# Patient Record
Sex: FEMALE | Race: ASIAN | NOT HISPANIC OR LATINO | Employment: OTHER | URBAN - METROPOLITAN AREA
[De-identification: names, ages, dates, MRNs, and addresses within clinical notes are randomized per-mention and may not be internally consistent; named-entity substitution may affect disease eponyms.]

---

## 2017-02-07 ENCOUNTER — OFFICE VISIT - HEALTHEAST (OUTPATIENT)
Dept: INTERNAL MEDICINE | Facility: CLINIC | Age: 62
End: 2017-02-07

## 2017-02-07 DIAGNOSIS — H25.013 CATARACT CORTICAL, SENILE, BILATERAL: ICD-10-CM

## 2017-02-07 DIAGNOSIS — D32.0 BENIGN NEOPLASM OF CEREBRAL MENINGES (H): ICD-10-CM

## 2017-02-07 DIAGNOSIS — I10 ESSENTIAL HYPERTENSION: ICD-10-CM

## 2017-02-07 DIAGNOSIS — E78.5 HYPERLIPIDEMIA: ICD-10-CM

## 2017-02-07 ASSESSMENT — MIFFLIN-ST. JEOR: SCORE: 1109.68

## 2017-04-13 ENCOUNTER — OFFICE VISIT - HEALTHEAST (OUTPATIENT)
Dept: INTERNAL MEDICINE | Facility: CLINIC | Age: 62
End: 2017-04-13

## 2017-04-13 DIAGNOSIS — G40.909 SEIZURE DISORDER (H): ICD-10-CM

## 2017-04-13 DIAGNOSIS — E78.5 HYPERLIPIDEMIA: ICD-10-CM

## 2017-04-13 DIAGNOSIS — I10 ESSENTIAL HYPERTENSION: ICD-10-CM

## 2017-04-13 DIAGNOSIS — E03.9 HYPOTHYROIDISM: ICD-10-CM

## 2017-04-13 LAB
CHOLEST SERPL-MCNC: 155 MG/DL
FASTING STATUS PATIENT QL REPORTED: YES
HDLC SERPL-MCNC: 61 MG/DL
LDLC SERPL CALC-MCNC: 74 MG/DL
TRIGL SERPL-MCNC: 101 MG/DL

## 2017-04-13 ASSESSMENT — MIFFLIN-ST. JEOR: SCORE: 1109.68

## 2017-04-17 ENCOUNTER — COMMUNICATION - HEALTHEAST (OUTPATIENT)
Dept: INTERNAL MEDICINE | Facility: CLINIC | Age: 62
End: 2017-04-17

## 2017-04-20 ENCOUNTER — COMMUNICATION - HEALTHEAST (OUTPATIENT)
Dept: INTERNAL MEDICINE | Facility: CLINIC | Age: 62
End: 2017-04-20

## 2017-04-20 DIAGNOSIS — G40.909 SEIZURE DISORDER (H): ICD-10-CM

## 2017-05-24 ENCOUNTER — VIRTUAL VISIT (OUTPATIENT)
Dept: NEUROLOGY | Facility: CLINIC | Age: 62
End: 2017-05-24

## 2017-05-24 DIAGNOSIS — G40.219 PARTIAL EPILEPSY WITH IMPAIRMENT OF CONSCIOUSNESS, INTRACTABLE (H): Primary | ICD-10-CM

## 2017-05-24 NOTE — MR AVS SNAPSHOT
After Visit Summary   2017    Suzan Ballard    MRN: [id number]           Patient Information     Date Of Birth          1955        Visit Information        Provider Department      2017 3:26 PM Julian Ledesma MD [facility]        Today's Diagnoses     Partial epilepsy with impairment of consciousness, intractable (H)    -  1       Follow-ups after your visit        Who to contact     Please call your clinic at [phone] to:    Ask questions about your health    Make or cancel appointments    Discuss your medicines    Learn about your test results    Speak to your doctor   If you have compliments or concerns about an experience at your clinic, or if you wish to file a complaint, please contact [facility] Patient Relations at [phone] or email us at [email or web address]         Additional Information About Your Visit        MyChart Information     LegalGuru is an electronic gateway that provides easy, online access to your medical records. With LegalGuru, you can request a clinic appointment, read your test results, renew a prescription or communicate with your care team.     To sign up for [employer] visit the website at [email or web address]   You will be asked to enter the access code listed below, as well as some personal information. Please follow the directions to create your username and password.     Your access code is: [email or web address]  Expires: 2017  3:30 PM     Your access code will  in 90 days. If you need help or a new code, please contact your [facility] or call [phone] for assistance.        Care EveryWhere ID     This is your Care EveryWhere ID. This could be used by other organizations to access your [facility] medical records  [id number]         Blood Pressure from Last 3 Encounters:   16 126/76   09/14/15 122/80   06/01/15 144/82    Weight from Last 3  Encounters:   04/18/16 145 lb (65.8 kg)   09/14/15 158 lb 3.2 oz (71.8 kg)   06/01/15 161 lb 6.4 oz (73.2 kg)              Today, you had the following     No orders found for display       Primary Care Provider Office Phone # Fax #    Lamont Kapadia [phone]       [facility] [address]  [place] [address]        Thank you!     Thank you for choosing [facility]  for your care. Our goal is always to provide you with excellent care. Hearing back from our patients is one way we can continue to improve our services. Please take a few minutes to complete the written survey that you may receive in the mail after your visit with us. Thank you!             Your Updated Medication List - Protect others around you: Learn how to safely use, store and throw away your medicines at www.disposemymeds.org.          This list is accurate as of: 5/24/17  3:30 PM.  Always use your most recent med list.                   Brand Name Dispense Instructions for use    CALCIUM 500 + D PO      Take 1 tablet by mouth daily.       lamoTRIgine 100 MG tablet    LaMICtal    270 tablet    Take one and one half tablets twice per day (total 300 mg per day)       levothyroxine 75 MCG tablet    SYNTHROID/LEVOTHROID     Take 1 tablet by mouth daily.       simvastatin 20 MG tablet    ZOCOR     Take 1 tablet by mouth daily.       TYLENOL ARTHRITIS PAIN 650 MG CR tablet   Generic drug:  acetaminophen      Take 650 mg by mouth as needed.

## 2017-05-24 NOTE — PROGRESS NOTES
Patient requested that DMV form be filled out. She has not been seen for more than one year.    Called patient. She denies seizures. Per patient last seizure Nov 2012.  She is taking AEDs regularly. Managing her own affairs. Memory same. Not getting lost. Driving without accidents or problems.    Told patient she must continue taking AEDs. Told needs to be seen on yearly basis.    Will fill out DMV form so that patient does not lose license. Insisted that she make appointment as soon as slot available for followup.

## 2017-06-13 ENCOUNTER — OFFICE VISIT - HEALTHEAST (OUTPATIENT)
Dept: INTERNAL MEDICINE | Facility: CLINIC | Age: 62
End: 2017-06-13

## 2017-06-13 ENCOUNTER — COMMUNICATION - HEALTHEAST (OUTPATIENT)
Dept: INTERNAL MEDICINE | Facility: CLINIC | Age: 62
End: 2017-06-13

## 2017-06-13 DIAGNOSIS — G40.909 SEIZURE DISORDER (H): ICD-10-CM

## 2017-06-13 DIAGNOSIS — I10 ESSENTIAL HYPERTENSION: ICD-10-CM

## 2017-06-13 DIAGNOSIS — G89.29 CHRONIC PAIN OF RIGHT KNEE: ICD-10-CM

## 2017-06-13 DIAGNOSIS — E78.5 HYPERLIPIDEMIA: ICD-10-CM

## 2017-06-13 DIAGNOSIS — M25.561 CHRONIC PAIN OF RIGHT KNEE: ICD-10-CM

## 2017-06-13 DIAGNOSIS — H69.91 EUSTACHIAN TUBE DYSFUNCTION, RIGHT: ICD-10-CM

## 2017-07-11 ENCOUNTER — RECORDS - HEALTHEAST (OUTPATIENT)
Dept: ADMINISTRATIVE | Facility: OTHER | Age: 62
End: 2017-07-11

## 2017-07-27 ENCOUNTER — OFFICE VISIT - HEALTHEAST (OUTPATIENT)
Dept: INTERNAL MEDICINE | Facility: CLINIC | Age: 62
End: 2017-07-27

## 2017-07-27 DIAGNOSIS — E03.9 HYPOTHYROID: ICD-10-CM

## 2017-07-27 DIAGNOSIS — D32.0 BENIGN NEOPLASM OF CEREBRAL MENINGES (H): ICD-10-CM

## 2017-07-27 DIAGNOSIS — I10 ESSENTIAL HYPERTENSION: ICD-10-CM

## 2017-07-27 DIAGNOSIS — E78.5 HYPERLIPIDEMIA: ICD-10-CM

## 2017-07-27 DIAGNOSIS — G40.909 SEIZURE DISORDER (H): ICD-10-CM

## 2017-07-27 DIAGNOSIS — H93.11 TINNITUS, RIGHT: ICD-10-CM

## 2017-07-27 LAB
CHOLEST SERPL-MCNC: 189 MG/DL
FASTING STATUS PATIENT QL REPORTED: YES
HDLC SERPL-MCNC: 69 MG/DL
LDLC SERPL CALC-MCNC: 95 MG/DL
TRIGL SERPL-MCNC: 126 MG/DL

## 2017-08-01 ENCOUNTER — AMBULATORY - HEALTHEAST (OUTPATIENT)
Dept: INTERNAL MEDICINE | Facility: CLINIC | Age: 62
End: 2017-08-01

## 2017-08-01 ENCOUNTER — COMMUNICATION - HEALTHEAST (OUTPATIENT)
Dept: INTERNAL MEDICINE | Facility: CLINIC | Age: 62
End: 2017-08-01

## 2017-08-01 DIAGNOSIS — E87.6 HYPOKALEMIA: ICD-10-CM

## 2017-08-02 ENCOUNTER — RECORDS - HEALTHEAST (OUTPATIENT)
Dept: ADMINISTRATIVE | Facility: OTHER | Age: 62
End: 2017-08-02

## 2017-08-09 ENCOUNTER — RECORDS - HEALTHEAST (OUTPATIENT)
Dept: ADMINISTRATIVE | Facility: OTHER | Age: 62
End: 2017-08-09

## 2017-08-09 ENCOUNTER — OFFICE VISIT (OUTPATIENT)
Dept: NEUROLOGY | Facility: CLINIC | Age: 62
End: 2017-08-09

## 2017-08-09 VITALS — SYSTOLIC BLOOD PRESSURE: 136 MMHG | HEART RATE: 68 BPM | DIASTOLIC BLOOD PRESSURE: 84 MMHG

## 2017-08-09 DIAGNOSIS — G40.209 LOCALIZATION-RELATED SYMPTOMATIC EPILEPSY AND EPILEPTIC SYNDROMES WITH COMPLEX PARTIAL SEIZURES, NOT INTRACTABLE, WITHOUT STATUS EPILEPTICUS (H): ICD-10-CM

## 2017-08-09 DIAGNOSIS — G40.219 PARTIAL EPILEPSY WITH IMPAIRMENT OF CONSCIOUSNESS, INTRACTABLE (H): ICD-10-CM

## 2017-08-09 DIAGNOSIS — D32.0 BENIGN NEOPLASM OF CEREBRAL MENINGES (H): Primary | ICD-10-CM

## 2017-08-09 RX ORDER — LAMOTRIGINE 100 MG/1
TABLET ORAL
Qty: 540 TABLET | Refills: 1 | Status: SHIPPED | OUTPATIENT
Start: 2017-08-09 | End: 2018-11-07

## 2017-08-09 RX ORDER — MECLIZINE HCL 25MG 25 MG/1
25 TABLET, CHEWABLE ORAL EVERY 6 HOURS PRN
COMMUNITY
End: 2024-07-12

## 2017-08-09 ASSESSMENT — PAIN SCALES - GENERAL: PAINLEVEL: NO PAIN (0)

## 2017-08-09 NOTE — LETTER
2017       RE: Suzan Ballard  [address]  [place] [facility] [place] [address]     Dear Colleague,    Thank you for referring your patient, Suzan Ballard, to the [facility] at [facility]. Please see a copy of my visit note below.    2017      Lamont Kapadia Jr, MD   [facility]   [address]   [place] [address]      RE:  Suzan Ballard   MRN: [id number]   : 1955      Dear Lamont:      This is in regard to followup on Suzan Ballard.  The patient returned today with a chief complaint of previously resected meningiomas, as well as seizure disorder.      The patient has followed up with Dr. Ledesma in the past but did request to follow up with me today.  I have actually not seen her since .  She last saw Dr. Ledesma and the patient has been treated for simple partial seizures.  She did have recurrent meningiomas and she had a second resection in 2013.  Her last MRI scan was done of the brain in 2015 and it did not show any significant change.  She has not had any recurrent seizures.  She denied any jerking movements and there is nothing to suggest loss of control.  She denied any significant sensory march nor sensory changes at all.  She did have a right carpel tunnel in the past but that seems to be quiescent.  The patient did remind me in the past she did not tolerate Dilantin.  She would rather not take an anticonvulsant, but she knows she has to be on it and does continue on Lamictal.  The patient has had blood work in the past and she had a recent potassium of 3.2 and you started her on potassium supplement.  Otherwise, she had a normal metabolic profile on 2017.  Her bilirubin was slightly elevated at 1.8.  Her free T4 was 1.2.  The patient did have a normal therapeutic Lamictal level of 10.5 on 2016.  She did have a normal complete blood count last with a  hemoglobin of 14.5 and a white count of 6100 and platelet count of 271,000.      The patient did remind me she suffers from chronic obstructive sleep apnea, and she does use her CPAP machine.  She has had no trouble driving whatsoever.  She has been very careful about followup in this office.  The patient understands that we are going to close this office 09/29 and she will have to be followed at the [facility] office in [place].      The patient did have a generalized convulsion in 11/2012.  She has also had trouble with her right knee, but an injection 3 weeks ago helped.  Her father is quite ill.  He is elderly, in his late 80s in the [facility] and she will be going back there probably to be with him.      Neurological examination revealed a pleasant woman.  Her Mini-Mental Status score today was 27/30.  She could not recall any of 3 objects after 5 minutes, but she had no trouble telling me about her history, nor any other medical problems.  She knew you and she knew me and she knew the president and about recent world affairs.  Her blood pressure is 136/84 with a pulse of 68.  Gait, station, cerebellar testing, muscle stretch reflexes, except for slightly increased right knee jerk, plantar stimulation, strength, cranial nerve examination including full visual fields, as well as extraocular movements, and cortical sensory testing are unremarkable.  I could not auscultate any cardiac rhythm abnormalities.  She had no gallops or murmurs and did not have cervical bruits.      IMPRESSION:   1.  Resected meningiomas.   2.  Simple partial seizures which are under good control.      The patient is going to go ahead and have appropriate blood work today.  This will be done through your office.  She is going to get back to me as to the results.  She did ask me to consider filling out her driving forms.  She is going to have followup with me after having a repeat MRI scan with and without gadolinium done.      I spent 35  minutes with the patient today.  Over 50% of the time this involved counseling and coordination of care.      Sincerely yours,      Raji Perez MD              D: 2017 14:28   T: 2017 14:49   MT: al      Name:     FLORESITA ROY   MRN:      [id number]        Account:      [id number]   :      1955           Service Date: 2017      Document: [id number]

## 2017-08-09 NOTE — MR AVS SNAPSHOT
After Visit Summary   8/9/2017    Suzan Ballard    MRN: [id number]           Patient Information     Date Of Birth          1955        Visit Information        Provider Department      8/9/2017 10:15 AM Raji Perez MD [facility]        Today's Diagnoses     Benign neoplasm of cerebral meninges (H)    -  1    Partial epilepsy with impairment of consciousness, intractable (H)        Localization-related symptomatic epilepsy and epileptic syndromes with complex partial seizures, not intractable, without status epilepticus (H)           Follow-ups after your visit        Follow-up notes from your care team     Return in about 6 days (around 8/15/2017).      Your next 10 appointments already scheduled     Aug 13, 2017  1:45 PM CDT   (Arrive by 1:30 PM)   MR BRAIN W/O & W CONTRAST with [address] MRI ([facility])    [address] [place] [id number] [phone]           Take your medicines as usual, unless your doctor tells you not to. Bring a list of your current medicines to your exam (including vitamins, minerals and over-the-counter drugs).  You will be given intravenous contrast for this exam. To prepare:   The day before your exam, drink extra fluids at least six 8-ounce glasses (unless your doctor tells you to restrict your fluids).   Have a blood test (creatinine test) within 30 days of your exam. Go to your clinic or Diagnostic Imaging Department for this test.  The MRI machine uses a strong magnet. Please wear clothes without metal (snaps, zippers). A sweatsuit works well, or we may give you a hospital gown.  Please remove any body piercings and hair extensions before you arrive. You will also remove watches, jewelry, hairpins, wallets, dentures, partial dental plates and hearing aids. You may wear contact lenses, and you may be able to wear your rings. We  have a safe place to keep your personal items, but it is safer to leave them at home.   **IMPORTANT** THE INSTRUCTIONS BELOW ARE ONLY FOR THOSE PATIENTS WHO HAVE BEEN TOLD THEY WILL RECEIVE SEDATION OR GENERAL ANESTHESIA DURING THEIR MRI PROCEDURE:  IF YOU WILL RECEIVE SEDATION (take medicine to help you relax during your exam):   You must get the medicine from your doctor before you arrive. Bring the medicine to the exam. Do not take it at home.   Arrive one hour early. Bring someone who can take you home after the test. Your medicine will make you sleepy. After the exam, you may not drive, take a bus or take a taxi by yourself.   No eating 8 hours before your exam. You may have clear liquids up until 4 hours before your exam. (Clear liquids include water, clear tea, black coffee and fruit juice without pulp.)  IF YOU WILL RECEIVE ANESTHESIA (be asleep for your exam):   Arrive 1 1/2 hours early. Bring someone who can take you home after the test. You may not drive, take a bus or take a taxi by yourself.   No eating 8 hours before your exam. You may have clear liquids up until 4 hours before your exam. (Clear liquids include water, clear tea, black coffee and fruit juice without pulp.)  Please call the Imaging Department at your exam site with any questions.              Future tests that were ordered for you today     Open Future Orders        Priority Expected Expires Ordered    MR Head w/o & w Contrast (MRI) Routine 8/9/2017 8/9/2018 8/9/2017            Who to contact     Please call your clinic at [phone] to:    Ask questions about your health    Make or cancel appointments    Discuss your medicines    Learn about your test results    Speak to your doctor   If you have compliments or concerns about an experience at your clinic, or if you wish to file a complaint, please contact [facility] Patient Relations at [phone] or email us at [email or web address]          Additional Information About Your Visit        [employer] Information     Voxbone is an electronic gateway that provides easy, online access to your medical records. With Voxbone, you can request a clinic appointment, read your test results, renew a prescription or communicate with your care team.     To sign up for Voxbone visit the website at [email or web address]   You will be asked to enter the access code listed below, as well as some personal information. Please follow the directions to create your username and password.     Your access code is: [email or web address]  Expires: 2017  3:30 PM     Your access code will  in 90 days. If you need help or a new code, please contact your [facility] or call [phone] for assistance.        Care EveryWhere ID     This is your Care EveryWhere ID. This could be used by other organizations to access your [facility] medical records  [id number]        Your Vitals Were     Pulse                   68            Blood Pressure from Last 3 Encounters:   17 136/84   16 126/76   09/14/15 122/80    Weight from Last 3 Encounters:   16 145 lb (65.8 kg)   09/14/15 158 lb 3.2 oz (71.8 kg)   06/01/15 161 lb 6.4 oz (73.2 kg)              We Performed the Following     Lamotrigine Level          Where to get your medicines      These medications were sent to [facility] - [place], [place] - [address] AT [address]  [address], [place] [address]     Phone:  [phone]     lamoTRIgine 100 MG tablet          Primary Care Provider Office Phone # Fax #    Lamont Kapadia [phone]       [address] [place] [id number]        Equal Access to Services     DAVID MARY : Kelley perdomoo Soross, waaxda luqadaha, qaybta kaalmada adetimboyasimran, delio nicholas. So [facility] [phone].    ATENCIÓN: Si shola espharry, zahra becerril  disposición servicios gratuitos de asistencia lingüística. Wilfred black [phone].    We comply with applicable federal civil rights laws and [place] laws. We do not discriminate on the basis of race, color, national origin, age, disability sex, sexual orientation or gender identity.            Thank you!     Thank you for choosing [facility]  for your care. Our goal is always to provide you with excellent care. Hearing back from our patients is one way we can continue to improve our services. Please take a few minutes to complete the written survey that you may receive in the mail after your visit with us. Thank you!             Your Updated Medication List - Protect others around you: Learn how to safely use, store and throw away your medicines at www.disposemymeds.org.          This list is accurate as of: 8/9/17 10:59 AM.  Always use your most recent med list.                   Brand Name Dispense Instructions for use Diagnosis    AMLODIPINE BESYLATE PO      Take 5 mg by mouth daily    Benign neoplasm of cerebral meninges (H), Partial epilepsy with impairment of consciousness, intractable (H)       CALCIUM 500 + D PO      Take 1 tablet by mouth daily.        lamoTRIgine 100 MG tablet    LaMICtal    540 tablet    Take one and one half tablets twice per day (total 300 mg per day)    Localization-related symptomatic epilepsy and epileptic syndromes with complex partial seizures, not intractable, without status epilepticus (H)       levothyroxine 75 MCG tablet    SYNTHROID/LEVOTHROID     Take 1 tablet by mouth daily.        meclizine 25 MG Chew      Take 25 mg by mouth every 6 hours as needed for dizziness    Benign neoplasm of cerebral meninges (H), Partial epilepsy with impairment of consciousness, intractable (H)       simvastatin 20 MG tablet    ZOCOR     Take 1 tablet by mouth daily.        TYLENOL ARTHRITIS PAIN 650 MG CR tablet   Generic drug:  acetaminophen       Take 650 mg by mouth as needed.

## 2017-08-11 ENCOUNTER — TRANSFERRED RECORDS (OUTPATIENT)
Dept: HEALTH INFORMATION MANAGEMENT | Facility: CLINIC | Age: 62
End: 2017-08-11

## 2017-08-11 ENCOUNTER — RECORDS - HEALTHEAST (OUTPATIENT)
Dept: ADMINISTRATIVE | Facility: OTHER | Age: 62
End: 2017-08-11

## 2017-08-11 ENCOUNTER — AMBULATORY - HEALTHEAST (OUTPATIENT)
Dept: LAB | Facility: CLINIC | Age: 62
End: 2017-08-11

## 2017-08-11 DIAGNOSIS — D32.0 BENIGN NEOPLASM OF CEREBRAL MENINGES (H): ICD-10-CM

## 2017-08-28 ENCOUNTER — CARE COORDINATION (OUTPATIENT)
Dept: NEUROLOGY | Facility: CLINIC | Age: 62
End: 2017-08-28

## 2017-08-28 NOTE — PROGRESS NOTES
2017      Lamont Kapadia Jr, MD   [address]   [place] [address]      RE:  Suzan Ballard   MRN: [id number]   : 1955      Dear Lamont:      This is in regard to followup on Suzan Ballard.  The patient returned today with a chief complaint of previously resected meningiomas, as well as seizure disorder.      The patient has followed up with Dr. Ledesma in the past but did request to follow up with me today.  I have actually not seen her since .  She last saw Dr. Ledesma and the patient has been treated for simple partial seizures.  She did have recurrent meningiomas and she had a second resection in 2013.  Her last MRI scan was done of the brain in 2015 and it did not show any significant change.  She has not had any recurrent seizures.  She denied any jerking movements and there is nothing to suggest loss of control.  She denied any significant sensory march nor sensory changes at all.  She did have a right carpel tunnel in the past but that seems to be quiescent.  The patient did remind me in the past she did not tolerate Dilantin.  She would rather not take an anticonvulsant, but she knows she has to be on it and does continue on Lamictal.  The patient has had blood work in the past and she had a recent potassium of 3.2 and you started her on potassium supplement.  Otherwise, she had a normal metabolic profile on 2017.  Her bilirubin was slightly elevated at 1.8.  Her free T4 was 1.2.  The patient did have a normal therapeutic Lamictal level of 10.5 on 2016.  She did have a normal complete blood count last with a hemoglobin of 14.5 and a white count of 6100 and platelet count of 271,000.      The patient did remind me she suffers from chronic obstructive sleep apnea, and she does use her CPAP machine.  She has had no trouble driving whatsoever.  She has been very careful about followup in this office.  The patient understands that  we are going to close this office 09/29 and she will have to be followed at the [facility] office in [place].      The patient did have a generalized convulsion in 11/2012.  She has also had trouble with her right knee, but an injection 3 weeks ago helped.  Her father is quite ill.  He is elderly, in his late 80s in the [facility] and she will be going back there probably to be with him.      Neurological examination revealed a pleasant woman.  Her Mini-Mental Status score today was 27/30.  She could not recall any of 3 objects after 5 minutes, but she had no trouble telling me about her history, nor any other medical problems.  She knew you and she knew me and she knew the president and about recent world affairs.  Her blood pressure is 136/84 with a pulse of 68.  Gait, station, cerebellar testing, muscle stretch reflexes, except for slightly increased right knee jerk, plantar stimulation, strength, cranial nerve examination including full visual fields, as well as extraocular movements, and cortical sensory testing are unremarkable.  I could not auscultate any cardiac rhythm abnormalities.  She had no gallops or murmurs and did not have cervical bruits.      IMPRESSION:   1.  Resected meningiomas.   2.  Simple partial seizures which are under good control.      The patient is going to go ahead and have appropriate blood work today.  This will be done through your office.  She is going to get back to me as to the results.  She did ask me to consider filling out her driving forms.  She is going to have followup with me after having a repeat MRI scan with and without gadolinium done.      I spent 35 minutes with the patient today.  Over 50% of the time this involved counseling and coordination of care.      Sincerely yours,      MD TANISHA Amos MD             D: 08/28/2017 14:28   T: 08/28/2017 14:49   MT: al      Name:     FLORESITA ROY   MRN:      [id number]        Account:       [id number]   :      1955           Service Date: 2017      Document: [id number]

## 2017-08-28 NOTE — PROGRESS NOTES
Katerine Burroughs RN Seeb, Tracey, RN                   Left a vm asking her to call me back.            Previous Messages       ----- Message -----      From: Raji Perez MD      Sent: 8/22/2017  11:29 AM        To: Katerine Burroughs RN     Tell her slight increase in meningioma; needs to see me in follow up            Message sent to the front staff to help arrange an appointment with Dr. Perez to follow up increase in meningioma.

## 2017-08-29 ENCOUNTER — TELEPHONE (OUTPATIENT)
Dept: NEUROLOGY | Facility: CLINIC | Age: 62
End: 2017-08-29

## 2017-08-29 NOTE — TELEPHONE ENCOUNTER
----- Message from Zina Vasquez sent at 8/29/2017  2:27 PM CDT -----  Left pt 2VMs    ----- Message -----     From: Katerine Burroughs RN     Sent: 8/28/2017   7:27 AM       To: Clinic [id number]    Please help schedule a return with Dr. Perez for follow up per Dr. Perez.  Thank you!

## 2017-10-23 ENCOUNTER — COMMUNICATION - HEALTHEAST (OUTPATIENT)
Dept: INTERNAL MEDICINE | Facility: CLINIC | Age: 62
End: 2017-10-23

## 2017-10-23 DIAGNOSIS — I10 ESSENTIAL HYPERTENSION: ICD-10-CM

## 2017-10-26 ENCOUNTER — OFFICE VISIT - HEALTHEAST (OUTPATIENT)
Dept: INTERNAL MEDICINE | Facility: CLINIC | Age: 62
End: 2017-10-26

## 2017-10-26 ENCOUNTER — COMMUNICATION - HEALTHEAST (OUTPATIENT)
Dept: INTERNAL MEDICINE | Facility: CLINIC | Age: 62
End: 2017-10-26

## 2017-10-26 DIAGNOSIS — Z23 NEED FOR IMMUNIZATION AGAINST INFLUENZA: ICD-10-CM

## 2017-10-26 DIAGNOSIS — I10 ESSENTIAL HYPERTENSION: ICD-10-CM

## 2017-10-26 DIAGNOSIS — J30.9 ALLERGIC RHINITIS: ICD-10-CM

## 2017-10-26 DIAGNOSIS — E03.9 HYPOTHYROIDISM: ICD-10-CM

## 2017-10-26 DIAGNOSIS — E78.5 HYPERLIPIDEMIA: ICD-10-CM

## 2017-10-26 DIAGNOSIS — Z12.4 PAP SMEAR FOR CERVICAL CANCER SCREENING: ICD-10-CM

## 2017-10-26 DIAGNOSIS — G40.909 SEIZURE DISORDER (H): ICD-10-CM

## 2017-10-26 LAB
CHOLEST SERPL-MCNC: 177 MG/DL
FASTING STATUS PATIENT QL REPORTED: YES
HDLC SERPL-MCNC: 62 MG/DL
LDLC SERPL CALC-MCNC: 95 MG/DL
TRIGL SERPL-MCNC: 98 MG/DL

## 2017-10-26 ASSESSMENT — MIFFLIN-ST. JEOR: SCORE: 1109.68

## 2017-10-28 ENCOUNTER — OFFICE VISIT (OUTPATIENT)
Dept: NEUROLOGY | Facility: CLINIC | Age: 62
End: 2017-10-28

## 2017-10-28 ENCOUNTER — RECORDS - HEALTHEAST (OUTPATIENT)
Dept: ADMINISTRATIVE | Facility: OTHER | Age: 62
End: 2017-10-28

## 2017-10-28 VITALS
SYSTOLIC BLOOD PRESSURE: 142 MMHG | WEIGHT: 145 LBS | HEIGHT: 60 IN | BODY MASS INDEX: 28.47 KG/M2 | DIASTOLIC BLOOD PRESSURE: 80 MMHG | HEART RATE: 68 BPM

## 2017-10-28 DIAGNOSIS — D32.0 BENIGN NEOPLASM OF CEREBRAL MENINGES (H): Primary | ICD-10-CM

## 2017-10-28 ASSESSMENT — PAIN SCALES - GENERAL: PAINLEVEL: NO PAIN (0)

## 2017-10-28 NOTE — LETTER
10/28/2017       RE: Suzan Ballard  [address]  [facility] [address]     Dear Colleague,    Thank you for referring your patient, Suzan Ballard, to the [facility] NEUROLOGY at [facility]. Please see a copy of my visit note below.    2017      Lamont Kapadia Jr., MD   [facility]    [address]   [place] [address]      RE: Suzan Ballard   MRN: [id number]   : 1955      Dear Lamont:      This is in regard to followup on Suzan Ballard.  The patient returned routinely today on 10/28/2017.  Her chief complaint is that of recurrent meningiomas as well as seizure disorder.      The patient has done quite well since I last saw her.  She went to visit her father in [place].  Fortunately, he did not have cancer and had some type of prostate surgery and is much better.  He is 86 years old.  She said she lost weight there and she feels better.  She went from 160 pounds after her last surgery to now 145 pounds.  She tells me she never misses her Lamictal dosage and she has had no trouble taking the 300 mg a day in divided dosage.  She continues on amlodipine, calcium and vitamin D, levothyroxine and simvastatin.  She told me that you just recently rechecked her potassium level and she was told it was fine.  She at my suggestion did have a Lamictal level that was therapeutic on 2017.  The patient did go over with me her blood tests from the Epic website.  Her Lamictal level was 5.4.      The patient did have followup MRI scan.  This was compared to the previous last study of 2015.  She did have a presumed meningioma that had slightly grown in size.  This was an extraaxial nodular lesion posterior to the superior sagittal sinus.  This measured 1.7 cm left to right 1.3 cm anterior posterior and 1.9 cm craniocaudally.  She also had a very tiny meningioma that was in the anterior falx right of midline measuring 8 x 5 x 7 mm  that was unchanged from prior studies.  I went over these films with her.      Neurologic examination showed the patient's blood pressure today was 142/80 with a pulse of 68.  She could draw a clock and make it say 4:30, but she inverted the hands of the clock.  She could recall 1/3 objects after 5 minutes with prompting 2/3 objects.  She could tell me the correct date and could tell me about Trump and about recent world affairs.  She had full visual fields to confrontation.  Reflexes were hypoactive in the arms, normal at the knees and absent at the ankles.  Her toes downward going to plantar stimulation.  Strength testing was normal.  She had a normal gait with no cerebellar abnormalities noted.  The patient had normal cardiac rhythm without gallops or murmurs and did not have cervical bruits.      IMPRESSION:   1.  Recurrent meningiomas.   2.  Stable seizure disorder.      I am going to refer the patient now back to our neurosurgeons and she did request that she see Dr. Sauer.  The patient will be followed in Neurology here.  She more than likely will have to have followup MRI scans at least now 1 in 3-6 months because of increase in size.  I will leave some of this to Dr. Sauer and to his recommendations.  The patient does understand all of this.      Thank you for allowing me to see this patient.      Sincerely yours,      Tanisha Perez MD      I spent 25 minutes with the patient today.  Over 50% of the time this involved counseling and coordination of care.         TANISHA PEREZ MD             D: 10/28/2017 11:27   T: 10/30/2017 07:03   MT: LUIS      Name:     FLORESITA ROY   MRN:      [id number]        Account:      [id number]   :      1955           Service Date: 10/28/2017      Document: [id number]       Again, thank you for allowing me to participate in the care of your patient.      Sincerely,    Tanisha Perez MD

## 2017-10-28 NOTE — MR AVS SNAPSHOT
After Visit Summary   10/28/2017    Suzan Ballard    MRN: [id number]           Patient Information     Date Of Birth          1955        Visit Information        Provider Department      10/28/2017 10:40 AM Raji Perez MD [facility] Neurology        Today's Diagnoses     Benign neoplasm of cerebral meninges (H)    -  1       Follow-ups after your visit        Additional Services     NEUROSURGERY REFERRAL       Your provider has referred you to: [facility]: Neurosurgery Clinic [facility] [phone]   [email or web address]    Please be aware that coverage of these services is subject to the terms and limitations of your health insurance plan.  Call member services at your health plan with any benefit or coverage questions.      Please bring the following with you to your appointment:    (1) Any X-Rays, CTs or MRIs which have been performed.  Contact the facility where they were done to arrange for  prior to your scheduled appointment.   (2) List of current medications  (3) This referral request   (4) Any documents/labs given to you for this referral                  Follow-up notes from your care team     Return in about 6 months (around 4/28/2018).      Your next 10 appointments already scheduled     Apr 25, 2018  9:00 AM CDT   (Arrive by 8:45 AM)   Return Visit with Raji Perez MD   [facility] Neurology ([facility])    [address] [place] [id number] [phone]              Who to contact     Please call your clinic at [phone] to:    Ask questions about your health    Make or cancel appointments    Discuss your medicines    Learn about your test results    Speak to your doctor   If you have compliments or concerns about an experience at your clinic, or if you wish to file a complaint, please contact [facility] Phys[facility] Patient Relations at [phone] or  email us at [email or web address]         Additional Information About Your Visit        [employer] Information     [employer] gives you secure access to your electronic health record. If you see a primary care provider, you can also send messages to your care team and make appointments. If you have questions, please call your primary care clinic.  If you do not have a primary care provider, please call [phone] and they will assist you.      [employer] is an electronic gateway that provides easy, online access to your medical records. With [employer], you can request a clinic appointment, read your test results, renew a prescription or communicate with your care team.     To access your existing account, please contact your [facility] or call [phone] for assistance.        Care EveryWhere ID     This is your Care EveryWhere ID. This could be used by other organizations to access your [facility] medical records  [id number]        Your Vitals Were     Pulse Height BMI (Body Mass Index)             68 1.524 m (5') 28.32 kg/m2          Blood Pressure from Last 3 Encounters:   10/28/17 142/80   08/09/17 136/84   04/18/16 126/76    Weight from Last 3 Encounters:   10/28/17 65.8 kg (145 lb)   04/18/16 65.8 kg (145 lb)   09/14/15 71.8 kg (158 lb 3.2 oz)              We Performed the Following     NEUROSURGERY REFERRAL        Primary Care Provider Office Phone # Fax #    Lamont Kapadia [phone]       [address] [address]        Equal Access to Services     PABLO MARY : Hadii zachariah perdomoo Soross, waaxda luqadaha, qaybta kaalmada adeegyasimran, delio nicholas. So [facility] [phone].    ATENCIÓN: Si habla español, tiene a becerril disposición servicios gratuitos de asistencia lingüística. Llame al [phone].    We comply with applicable federal civil rights laws and [place] laws. We do not  discriminate on the basis of race, color, national origin, age, disability, sex, sexual orientation, or gender identity.            Thank you!     Thank you for choosing [facility] NEUROLOGY  for your care. Our goal is always to provide you with excellent care. Hearing back from our patients is one way we can continue to improve our services. Please take a few minutes to complete the written survey that you may receive in the mail after your visit with us. Thank you!             Your Updated Medication List - Protect others around you: Learn how to safely use, store and throw away your medicines at www.disposemymeds.org.          This list is accurate as of: 10/28/17 11:19 AM.  Always use your most recent med list.                   Brand Name Dispense Instructions for use Diagnosis    AMLODIPINE BESYLATE PO      Take 5 mg by mouth daily    Benign neoplasm of cerebral meninges (H), Partial epilepsy with impairment of consciousness, intractable (H)       CALCIUM 500 + D PO      Take 1 tablet by mouth daily.        lamoTRIgine 100 MG tablet    LaMICtal    540 tablet    Take one and one half tablets twice per day (total 300 mg per day)    Localization-related symptomatic epilepsy and epileptic syndromes with complex partial seizures, not intractable, without status epilepticus (H)       levothyroxine 75 MCG tablet    SYNTHROID/LEVOTHROID     Take 1 tablet by mouth daily.        meclizine 25 MG Chew      Take 25 mg by mouth every 6 hours as needed for dizziness    Benign neoplasm of cerebral meninges (H), Partial epilepsy with impairment of consciousness, intractable (H)       simvastatin 20 MG tablet    ZOCOR     Take 1 tablet by mouth daily.        TYLENOL ARTHRITIS PAIN 650 MG CR tablet   Generic drug:  acetaminophen      Take 650 mg by mouth as needed.

## 2017-10-30 ENCOUNTER — COMMUNICATION - HEALTHEAST (OUTPATIENT)
Dept: INTERNAL MEDICINE | Facility: CLINIC | Age: 62
End: 2017-10-30

## 2017-10-30 ENCOUNTER — RECORDS - HEALTHEAST (OUTPATIENT)
Dept: ADMINISTRATIVE | Facility: OTHER | Age: 62
End: 2017-10-30

## 2017-10-30 NOTE — PROGRESS NOTES
2017      Lamont Kapadia Jr., MD   [facility]    [address]   [place] [address]      RE: Suzan Ballard   MRN: [id number]   : 1955      Dear Lamont:      This is in regard to followup on Suzan Ballard.  The patient returned routinely today on 10/28/2017.  Her chief complaint is that of recurrent meningiomas as well as seizure disorder.      The patient has done quite well since I last saw her.  She went to visit her father in [place].  Fortunately, he did not have cancer and had some type of prostate surgery and is much better.  He is 86 years old.  She said she lost weight there and she feels better.  She went from 160 pounds after her last surgery to now 145 pounds.  She tells me she never misses her Lamictal dosage and she has had no trouble taking the 300 mg a day in divided dosage.  She continues on amlodipine, calcium and vitamin D, levothyroxine and simvastatin.  She told me that you just recently rechecked her potassium level and she was told it was fine.  She at my suggestion did have a Lamictal level that was therapeutic on 2017.  The patient did go over with me her blood tests from the Epic website.  Her Lamictal level was 5.4.      The patient did have followup MRI scan.  This was compared to the previous last study of 2015.  She did have a presumed meningioma that had slightly grown in size.  This was an extraaxial nodular lesion posterior to the superior sagittal sinus.  This measured 1.7 cm left to right 1.3 cm anterior posterior and 1.9 cm craniocaudally.  She also had a very tiny meningioma that was in the anterior falx right of midline measuring 8 x 5 x 7 mm that was unchanged from prior studies.  I went over these films with her.      Neurologic examination showed the patient's blood pressure today was 142/80 with a pulse of 68.  She could draw a clock and make it say 4:30, but she inverted the hands of the clock.  She could recall  1/3 objects after 5 minutes with prompting 2/3 objects.  She could tell me the correct date and could tell me about Trump and about recent world affairs.  She had full visual fields to confrontation.  Reflexes were hypoactive in the arms, normal at the knees and absent at the ankles.  Her toes downward going to plantar stimulation.  Strength testing was normal.  She had a normal gait with no cerebellar abnormalities noted.  The patient had normal cardiac rhythm without gallops or murmurs and did not have cervical bruits.      IMPRESSION:   1.  Recurrent meningiomas.   2.  Stable seizure disorder.      I am going to refer the patient now back to our neurosurgeons and she did request that she see Dr. Sauer.  The patient will be followed in Neurology here.  She more than likely will have to have followup MRI scans at least now 1 in 3-6 months because of increase in size.  I will leave some of this to Dr. Sauer and to his recommendations.  The patient does understand all of this.      Thank you for allowing me to see this patient.      Sincerely yours,      Tanisha Perez MD      I spent 25 minutes with the patient today.  Over 50% of the time this involved counseling and coordination of care.         TANISHA PEREZ MD             D: 10/28/2017 11:27   T: 10/30/2017 07:03   MT: LUIS      Name:     FLORESITA ROY   MRN:      [id number]        Account:      [id number]   :      1955           Service Date: 10/28/2017      Document: [id number]

## 2017-10-31 ASSESSMENT — ENCOUNTER SYMPTOMS
MUSCLE WEAKNESS: 0
SINUS CONGESTION: 0
EYE REDNESS: 1
DOUBLE VISION: 0
DIZZINESS: 1
SORE THROAT: 0
SEIZURES: 0
ARTHRALGIAS: 1
DEPRESSION: 0
NUMBNESS: 0
MYALGIAS: 1
EYE WATERING: 1
WEAKNESS: 0
EYE IRRITATION: 1
NECK PAIN: 1
BACK PAIN: 0
EYE PAIN: 0
HEADACHES: 1
JOINT SWELLING: 0
NECK MASS: 0
DECREASED CONCENTRATION: 1
STIFFNESS: 1
MEMORY LOSS: 1
SPEECH CHANGE: 0
TINGLING: 0
DISTURBANCES IN COORDINATION: 1
SINUS PAIN: 0
PARALYSIS: 0
MUSCLE CRAMPS: 0
TASTE DISTURBANCE: 0
LOSS OF CONSCIOUSNESS: 0
PANIC: 1
NERVOUS/ANXIOUS: 1
TROUBLE SWALLOWING: 0
TREMORS: 0
HOARSE VOICE: 0
SMELL DISTURBANCE: 0
INSOMNIA: 1

## 2017-11-06 ENCOUNTER — RECORDS - HEALTHEAST (OUTPATIENT)
Dept: ADMINISTRATIVE | Facility: OTHER | Age: 62
End: 2017-11-06

## 2017-11-06 ENCOUNTER — OFFICE VISIT (OUTPATIENT)
Dept: NEUROSURGERY | Facility: CLINIC | Age: 62
End: 2017-11-06
Attending: NEUROLOGICAL SURGERY
Payer: MEDICARE

## 2017-11-06 VITALS
SYSTOLIC BLOOD PRESSURE: 139 MMHG | DIASTOLIC BLOOD PRESSURE: 80 MMHG | WEIGHT: 149.1 LBS | BODY MASS INDEX: 29.12 KG/M2 | OXYGEN SATURATION: 95 % | HEART RATE: 71 BPM | RESPIRATION RATE: 16 BRPM | TEMPERATURE: 97.9 F

## 2017-11-06 DIAGNOSIS — D32.0 BENIGN NEOPLASM OF CEREBRAL MENINGES (H): Primary | ICD-10-CM

## 2017-11-06 PROCEDURE — 99211 OFF/OP EST MAY X REQ PHY/QHP: CPT

## 2017-11-06 ASSESSMENT — PAIN SCALES - GENERAL: PAINLEVEL: NO PAIN (0)

## 2017-11-06 NOTE — MR AVS SNAPSHOT
After Visit Summary   11/6/2017    Suzan Ballard    MRN: [id number]           Patient Information     Date Of Birth          1955        Visit Information        Provider Department      11/6/2017 3:15 PM Narinder Sauer MD [facility]        Today's Diagnoses     Benign neoplasm of cerebral meninges (H)    -  1       Follow-ups after your visit        Follow-up notes from your care team     Return in about 1 year (around 11/6/2018) for Imaging.      Your next 10 appointments already scheduled     Apr 25, 2018  9:00 AM CDT   (Arrive by 8:45 AM)   Return Visit with Raji Perez MD   [facility] Neurology ([facility])    [address] [place] [address]  [address] [facility] [id number] [phone]            Nov 03, 2018  9:15 AM CDT   (Arrive by 9:00 AM)   MR BRAIN W/O & W CONTRAST with [address] MRI ([facility])    [address] [place] [id number] [phone]           Take your medicines as usual, unless your doctor tells you not to. Bring a list of your current medicines to your exam (including vitamins, minerals and over-the-counter drugs).  You will be given intravenous contrast for this exam. To prepare:   The day before your exam, drink extra fluids at least six 8-ounce glasses (unless your doctor tells you to restrict your fluids).   Have a blood test (creatinine test) within 30 days of your exam. Go to your clinic or Diagnostic Imaging Department for this test.  The MRI machine uses a strong magnet. Please wear clothes without metal (snaps, zippers). A sweatsuit works well, or we may give you a hospital gown.  Please remove any body piercings and hair extensions before you arrive. You will also remove watches, jewelry, hairpins, wallets, dentures, partial dental plates and hearing aids. You may wear contact lenses, and you may be able to wear your rings. We have a  safe place to keep your personal items, but it is safer to leave them at home.   **IMPORTANT** THE INSTRUCTIONS BELOW ARE ONLY FOR THOSE PATIENTS WHO HAVE BEEN TOLD THEY WILL RECEIVE SEDATION OR GENERAL ANESTHESIA DURING THEIR MRI PROCEDURE:  IF YOU WILL RECEIVE SEDATION (take medicine to help you relax during your exam):   You must get the medicine from your doctor before you arrive. Bring the medicine to the exam. Do not take it at home.   Arrive one hour early. Bring someone who can take you home after the test. Your medicine will make you sleepy. After the exam, you may not drive, take a bus or take a taxi by yourself.   No eating 8 hours before your exam. You may have clear liquids up until 4 hours before your exam. (Clear liquids include water, clear tea, black coffee and fruit juice without pulp.)  IF YOU WILL RECEIVE ANESTHESIA (be asleep for your exam):   Arrive 1 1/2 hours early. Bring someone who can take you home after the test. You may not drive, take a bus or take a taxi by yourself.   No eating 8 hours before your exam. You may have clear liquids up until 4 hours before your exam. (Clear liquids include water, clear tea, black coffee and fruit juice without pulp.)  Please call the Imaging Department at your exam site with any questions.              Future tests that were ordered for you today     Open Future Orders        Priority Expected Expires Ordered    MR Brain w/o & w Contrast Routine 11/7/2018 11/7/2018 11/6/2017            Who to contact     If you have questions or need follow up information about today's clinic visit or your schedule please contact [facility] CANCER CLINIC directly at [phone].  Normal or non-critical lab and imaging results will be communicated to you by MyChart, letter or phone within 4 business days after the clinic has received the results. If you do not hear from us within 7 days, please contact the clinic through MyChart or phone. If you have a critical or  abnormal lab result, we will notify you by phone as soon as possible.  Submit refill requests through "[employer]" or call your pharmacy and they will forward the refill request to us. Please allow 3 business days for your refill to be completed.          Additional Information About Your Visit        [employer] Information     "[employer] gives you secure access to your electronic health record. If you see a primary care provider, you can also send messages to your care team and make appointments. If you have questions, please call your primary care clinic.  If you do not have a primary care provider, please call [phone] and they will assist you.        Care EveryWhere ID     This is your Care EveryWhere ID. This could be used by other organizations to access your [facility] medical records  [id number]        Your Vitals Were     Pulse Temperature Respirations Pulse Oximetry BMI (Body Mass Index)       71 97.9  F (36.6  C) 16 95% 29.12 kg/m2        Blood Pressure from Last 3 Encounters:   11/06/17 139/80   10/28/17 142/80   08/09/17 136/84    Weight from Last 3 Encounters:   11/06/17 67.6 kg (149 lb 1.6 oz)   10/28/17 65.8 kg (145 lb)   04/18/16 65.8 kg (145 lb)               Primary Care Provider Office Phone # Fax #    Lamont GARCIA Kapadia [phone]       [facility] [address] [facility] [address] [place] [address]        Equal Access to Services     PABLO MARY : Hadii aad ku hadasho Soomaali, waaxda luqadaha, qaybta kaalmada adetimboyada, delio howell . So [facility] [phone].    ATENCIÓN: Si habla español, tiene a becerril disposición servicios gratuitos de asistencia lingüística. Wilfred black [phone].    We comply with applicable federal civil rights laws and [place] laws. We do not discriminate on the basis of race, color, national origin, age, disability, sex, sexual orientation, or gender identity.            Thank you!     Thank you for choosing [facility] CANCER [facility]   for your care. Our goal is always to provide you with excellent care. Hearing back from our patients is one way we can continue to improve our services. Please take a few minutes to complete the written survey that you may receive in the mail after your visit with us. Thank you!             Your Updated Medication List - Protect others around you: Learn how to safely use, store and throw away your medicines at www.disposemymeds.org.          This list is accurate as of: 11/6/17  3:51 PM.  Always use your most recent med list.                   Brand Name Dispense Instructions for use Diagnosis    AMLODIPINE BESYLATE PO      Take 5 mg by mouth daily    Benign neoplasm of cerebral meninges (H), Partial epilepsy with impairment of consciousness, intractable (H)       CALCIUM 500 + D PO      Take 1 tablet by mouth daily.        lamoTRIgine 100 MG tablet    LaMICtal    540 tablet    Take one and one half tablets twice per day (total 300 mg per day)    Localization-related symptomatic epilepsy and epileptic syndromes with complex partial seizures, not intractable, without status epilepticus (H)       levothyroxine 75 MCG tablet    SYNTHROID/LEVOTHROID     Take 1 tablet by mouth daily.        meclizine 25 MG Chew      Take 25 mg by mouth every 6 hours as needed for dizziness    Benign neoplasm of cerebral meninges (H), Partial epilepsy with impairment of consciousness, intractable (H)       simvastatin 20 MG tablet    ZOCOR     Take 1 tablet by mouth daily.        TYLENOL ARTHRITIS PAIN 650 MG CR tablet   Generic drug:  acetaminophen      Take 650 mg by mouth as needed.

## 2017-11-06 NOTE — LETTER
11/6/2017       RE: Suzan Ballard  [address]  [facility] [address]     Dear Colleague,    Thank you for referring your patient, Suzan Ballard, to the [facility]ER [facility]. Please see a copy of my visit note below.    Neurosurgery Clinic     11/06/17    HPI:   Suzan Blalard is a 61 year old female with hx of falcine meningioma s/p resection (1995 and 2012/2013). Her initial resection was performed by Dr. Mcgregor, and her second resection was completed by Dr. Hickey.     Her last follow-up visit was with Dr. Monreal in 2015. She is referred to clinic today by Dr. Perez after obtaining a repeat MRI brain. Neurologically, she denies new neurologic symptoms. She does report dizziness which has been ongoing for a while. These are transient episodes that are positional in nature. Reportedly, the patient has not been to the dizziness center yet.    Otherwise, she denies issues with vision, changes in hearing or taste, imbalance, focal limb weakness, numbness or tingling, or imbalance. However, she does report that she had ceased work after her second resection due to associated disability with symptoms involving memory.     SurgHx: meningioma resection x 2. Laparoscopic cholecystectomy.     PMH:  Hypertension, thyroid disorder, hyperlipidemia    Medications:   Current Outpatient Prescriptions   Medication     AMLODIPINE BESYLATE PO     meclizine 25 MG CHEW     lamoTRIgine (LAMICTAL) 100 MG tablet     Calcium Carbonate-Vitamin D (CALCIUM 500 + D PO)     levothyroxine (SYNTHROID, LEVOTHROID) 75 MCG tablet     simvastatin (ZOCOR) 20 MG tablet     acetaminophen (TYLENOL ARTHRITIS PAIN) 650 MG CR tablet     No current facility-administered medications for this visit.      SocHx: never smoker. Does not drink.     FamHx: no bleeding or clotting history in family     Physical exam:  /80  Pulse 71  Temp 97.9  F (36.6  C)  Resp 16  Wt 67.6 kg (149 lb 1.6 oz)  SpO2 95%  BMI 29.12 kg/m2    General:  "appears comfortable, in no acute distress   NEURO:  CN: PERRL bilaterally. EOMI. Symmetric eyelid elevation, palate elevation, tongue protrusion, and smile. Sensation intact to light touch in all trigeminal distributions bilaterally. 5/5 SCM bilaterally. No aphasia or dysarthria.   Motor:      Deltoid Triceps Biceps Wrist Ext Wrist Flex    R 5 5 5 5 5 5   L 5 5 5 5 5 5    Hip Flex. Knee Ext. Knee Flex. Dorsiflex. Plantarflex.    R 5 5 5 5 5    L 5 5 5 5 5      Sensory: Intact to light touch bilaterally in upper and lower extremities   Reflexes: Patellar, brachioradialis, and biceps tendons 2+ bilaterally.   Gait: Negative Romberg. Intact gait, toe walking, and heel walking     Imaging:    MRI brain 8/13/17: some mild increase in size of anterior and posterior falcine meningiomas     Assessment:  Suzan Ballard is a 61 year old female with hx of falcine meningioma s/p resection (1995 and 2012/2013). She remains asymptomatic and imaging demonstrates mild interval enlargement from scan 2 years prior. At this time, the options of gamma knife, surgical resection, and watchful waiting were discussed with the patient. At this time, after discussing the risks, benefits and alternatives, the patient elects to proceed with watchful waiting.     Plan:   --return to clinic in 1 year with repeat MRI brain with and without contrast    Merrill \"Ryan\" MD Yesica   Neurosurgery, PGY-1    I saw the patient with the resident.  I have reviewed and edited the resident note and agree with the plan of care.      Narinder Sauer MD           "

## 2017-11-06 NOTE — NURSING NOTE
Oncology Rooming Note    November 6, 2017 2:53 PM   Suzan Ballard is a 61 year old female who presents for:    Chief Complaint   Patient presents with     RECHECK     provider visit, hx malignant neoplasm of brain     Initial Vitals: /80  Pulse 71  Temp 97.9  F (36.6  C)  Resp 16  Wt 67.6 kg (149 lb 1.6 oz)  SpO2 95%  BMI 29.12 kg/m2 Estimated body mass index is 29.12 kg/(m^2) as calculated from the following:    Height as of 10/28/17: 1.524 m (5').    Weight as of this encounter: 67.6 kg (149 lb 1.6 oz). Body surface area is 1.69 meters squared.  No Pain (0) Comment: Data Unavailable   No LMP recorded. Patient is postmenopausal.  Allergies reviewed: Yes  Medications reviewed: Yes    Medications: Medication refills not needed today.  Pharmacy name entered into [facility]: [id number] - [place], [place] - [address] [place] [address] AT [address] & [address] [place] [address]    Clinical concerns: None    2 minutes for nursing intake (face to face time)     Vidhya Mosley RN

## 2017-11-07 NOTE — PROGRESS NOTES
Neurosurgery Clinic     11/06/17    HPI:   Suzan Ballard is a 61 year old female with hx of falcine meningioma s/p resection (1995 and 2012/2013). Her initial resection was performed by Dr. Mcgregor, and her second resection was completed by Dr. Hickey.     Her last follow-up visit was with Dr. Monreal in 2015. She is referred to clinic today by Dr. Perez after obtaining a repeat MRI brain. Neurologically, she denies new neurologic symptoms. She does report dizziness which has been ongoing for a while. These are transient episodes that are positional in nature. Reportedly, the patient has not been to the dizziness center yet.    Otherwise, she denies issues with vision, changes in hearing or taste, imbalance, focal limb weakness, numbness or tingling, or imbalance. However, she does report that she had ceased work after her second resection due to associated disability with symptoms involving memory.     SurgHx: meningioma resection x 2. Laparoscopic cholecystectomy.     PMH:  Hypertension, thyroid disorder, hyperlipidemia    Medications:   Current Outpatient Prescriptions   Medication     AMLODIPINE BESYLATE PO     meclizine 25 MG CHEW     lamoTRIgine (LAMICTAL) 100 MG tablet     Calcium Carbonate-Vitamin D (CALCIUM 500 + D PO)     levothyroxine (SYNTHROID, LEVOTHROID) 75 MCG tablet     simvastatin (ZOCOR) 20 MG tablet     acetaminophen (TYLENOL ARTHRITIS PAIN) 650 MG CR tablet     No current facility-administered medications for this visit.      SocHx: never smoker. Does not drink.     FamHx: no bleeding or clotting history in family     Physical exam:  /80  Pulse 71  Temp 97.9  F (36.6  C)  Resp 16  Wt 67.6 kg (149 lb 1.6 oz)  SpO2 95%  BMI 29.12 kg/m2    General: appears comfortable, in no acute distress   NEURO:  CN: PERRL bilaterally. EOMI. Symmetric eyelid elevation, palate elevation, tongue protrusion, and smile. Sensation intact to light touch in all trigeminal distributions bilaterally. 5/5 SCM  "bilaterally. No aphasia or dysarthria.   Motor:      Deltoid Triceps Biceps Wrist Ext Wrist Flex    R 5 5 5 5 5 5   L 5 5 5 5 5 5    Hip Flex. Knee Ext. Knee Flex. Dorsiflex. Plantarflex.    R 5 5 5 5 5    L 5 5 5 5 5      Sensory: Intact to light touch bilaterally in upper and lower extremities   Reflexes: Patellar, brachioradialis, and biceps tendons 2+ bilaterally.   Gait: Negative Romberg. Intact gait, toe walking, and heel walking     Imaging:    MRI brain 8/13/17: some mild increase in size of anterior and posterior falcine meningiomas     Assessment:  Suzan Ballard is a 61 year old female with hx of falcine meningioma s/p resection (1995 and 2012/2013). She remains asymptomatic and imaging demonstrates mild interval enlargement from scan 2 years prior. At this time, the options of gamma knife, surgical resection, and watchful waiting were discussed with the patient. At this time, after discussing the risks, benefits and alternatives, the patient elects to proceed with watchful waiting.     Plan:   --return to clinic in 1 year with repeat MRI brain with and without contrast    Merrill \"Ryan\" MD [place]   Neurosurgery, PGY-1    I saw the patient with the resident.  I have reviewed and edited the resident note and agree with the plan of care.      Narinder Sauer MD           "

## 2017-11-19 ENCOUNTER — HEALTH MAINTENANCE LETTER (OUTPATIENT)
Age: 62
End: 2017-11-19

## 2017-12-15 ENCOUNTER — COMMUNICATION - HEALTHEAST (OUTPATIENT)
Dept: INTERNAL MEDICINE | Facility: CLINIC | Age: 62
End: 2017-12-15

## 2017-12-15 DIAGNOSIS — E03.9 HYPOTHYROIDISM: ICD-10-CM

## 2018-01-04 ENCOUNTER — RECORDS - HEALTHEAST (OUTPATIENT)
Dept: ADMINISTRATIVE | Facility: OTHER | Age: 63
End: 2018-01-04

## 2018-01-30 ENCOUNTER — OFFICE VISIT - HEALTHEAST (OUTPATIENT)
Dept: INTERNAL MEDICINE | Facility: CLINIC | Age: 63
End: 2018-01-30

## 2018-01-30 ENCOUNTER — COMMUNICATION - HEALTHEAST (OUTPATIENT)
Dept: INTERNAL MEDICINE | Facility: CLINIC | Age: 63
End: 2018-01-30

## 2018-01-30 DIAGNOSIS — I10 ESSENTIAL HYPERTENSION: ICD-10-CM

## 2018-01-30 DIAGNOSIS — D32.0 BENIGN NEOPLASM OF CEREBRAL MENINGES (H): ICD-10-CM

## 2018-01-30 DIAGNOSIS — J30.9 ALLERGIC RHINITIS: ICD-10-CM

## 2018-01-30 DIAGNOSIS — E78.5 HYPERLIPIDEMIA: ICD-10-CM

## 2018-01-30 DIAGNOSIS — G40.909 SEIZURE DISORDER (H): ICD-10-CM

## 2018-01-30 DIAGNOSIS — E03.9 HYPOTHYROIDISM: ICD-10-CM

## 2018-01-30 LAB
ALBUMIN SERPL-MCNC: 3.8 G/DL (ref 3.5–5)
ALP SERPL-CCNC: 107 U/L (ref 45–120)
ALT SERPL W P-5'-P-CCNC: 21 U/L (ref 0–45)
ANION GAP SERPL CALCULATED.3IONS-SCNC: 10 MMOL/L (ref 5–18)
AST SERPL W P-5'-P-CCNC: 16 U/L (ref 0–40)
BILIRUB DIRECT SERPL-MCNC: 0.6 MG/DL
BILIRUB SERPL-MCNC: 2.4 MG/DL (ref 0–1)
BUN SERPL-MCNC: 14 MG/DL (ref 8–22)
CALCIUM SERPL-MCNC: 9.8 MG/DL (ref 8.5–10.5)
CHLORIDE BLD-SCNC: 105 MMOL/L (ref 98–107)
CHOLEST SERPL-MCNC: 183 MG/DL
CO2 SERPL-SCNC: 26 MMOL/L (ref 22–31)
CREAT SERPL-MCNC: 0.65 MG/DL (ref 0.6–1.1)
ERYTHROCYTE [DISTWIDTH] IN BLOOD BY AUTOMATED COUNT: 12.3 % (ref 11–14.5)
FASTING STATUS PATIENT QL REPORTED: YES
GFR SERPL CREATININE-BSD FRML MDRD: >60 ML/MIN/1.73M2
GLUCOSE BLD-MCNC: 98 MG/DL (ref 70–125)
HCT VFR BLD AUTO: 40.5 % (ref 35–47)
HDLC SERPL-MCNC: 63 MG/DL
HGB BLD-MCNC: 13.2 G/DL (ref 12–16)
LDLC SERPL CALC-MCNC: 94 MG/DL
MCH RBC QN AUTO: 26.4 PG (ref 27–34)
MCHC RBC AUTO-ENTMCNC: 32.7 G/DL (ref 32–36)
MCV RBC AUTO: 81 FL (ref 80–100)
PLATELET # BLD AUTO: 268 THOU/UL (ref 140–440)
PMV BLD AUTO: 6.7 FL (ref 7–10)
POTASSIUM BLD-SCNC: 3.5 MMOL/L (ref 3.5–5)
PROT SERPL-MCNC: 7.2 G/DL (ref 6–8)
RBC # BLD AUTO: 5.01 MILL/UL (ref 3.8–5.4)
SODIUM SERPL-SCNC: 141 MMOL/L (ref 136–145)
T4 FREE SERPL-MCNC: 1.1 NG/DL (ref 0.7–1.8)
TRIGL SERPL-MCNC: 132 MG/DL
TSH SERPL DL<=0.005 MIU/L-ACNC: 2.97 UIU/ML (ref 0.3–5)
WBC: 4.9 THOU/UL (ref 4–11)

## 2018-01-30 ASSESSMENT — MIFFLIN-ST. JEOR: SCORE: 1118.75

## 2018-01-31 ENCOUNTER — COMMUNICATION - HEALTHEAST (OUTPATIENT)
Dept: INTERNAL MEDICINE | Facility: CLINIC | Age: 63
End: 2018-01-31

## 2018-01-31 LAB — LAMOTRIGINE SERPL-MCNC: 6.2 UG/ML (ref 2.5–15)

## 2018-04-02 ENCOUNTER — COMMUNICATION - HEALTHEAST (OUTPATIENT)
Dept: INTERNAL MEDICINE | Facility: CLINIC | Age: 63
End: 2018-04-02

## 2018-04-02 DIAGNOSIS — I10 ESSENTIAL HYPERTENSION: ICD-10-CM

## 2018-04-13 ASSESSMENT — ENCOUNTER SYMPTOMS
DISTURBANCES IN COORDINATION: 1
TINGLING: 1
DECREASED CONCENTRATION: 1
EYE PAIN: 0
SPEECH CHANGE: 0
WEAKNESS: 1
EYE IRRITATION: 1
MUSCLE CRAMPS: 1
TREMORS: 0
HEMOPTYSIS: 0
DEPRESSION: 0
PANIC: 1
STIFFNESS: 1
NUMBNESS: 1
POSTURAL DYSPNEA: 0
ARTHRALGIAS: 1
COUGH: 0
SHORTNESS OF BREATH: 0
DYSPNEA ON EXERTION: 0
LOSS OF CONSCIOUSNESS: 0
SNORES LOUDLY: 1
MYALGIAS: 1
DIZZINESS: 1
MUSCLE WEAKNESS: 1
PARALYSIS: 0
EYE WATERING: 1
MEMORY LOSS: 1
HEADACHES: 1
SPUTUM PRODUCTION: 1
BACK PAIN: 0
WHEEZING: 0
NERVOUS/ANXIOUS: 1
NECK PAIN: 0
DOUBLE VISION: 1
INSOMNIA: 1
COUGH DISTURBING SLEEP: 1
JOINT SWELLING: 1
EYE REDNESS: 1
SEIZURES: 0

## 2018-04-25 ENCOUNTER — OFFICE VISIT (OUTPATIENT)
Dept: NEUROLOGY | Facility: CLINIC | Age: 63
End: 2018-04-25
Payer: MEDICARE

## 2018-04-25 ENCOUNTER — RECORDS - HEALTHEAST (OUTPATIENT)
Dept: ADMINISTRATIVE | Facility: OTHER | Age: 63
End: 2018-04-25

## 2018-04-25 VITALS
DIASTOLIC BLOOD PRESSURE: 84 MMHG | BODY MASS INDEX: 29.35 KG/M2 | HEART RATE: 82 BPM | HEIGHT: 60 IN | SYSTOLIC BLOOD PRESSURE: 149 MMHG | WEIGHT: 149.5 LBS

## 2018-04-25 DIAGNOSIS — E55.9 VITAMIN D DEFICIENCY: ICD-10-CM

## 2018-04-25 DIAGNOSIS — G40.219 PARTIAL EPILEPSY WITH IMPAIRMENT OF CONSCIOUSNESS, INTRACTABLE (H): ICD-10-CM

## 2018-04-25 DIAGNOSIS — G40.219 PARTIAL EPILEPSY WITH IMPAIRMENT OF CONSCIOUSNESS, INTRACTABLE (H): Primary | ICD-10-CM

## 2018-04-25 DIAGNOSIS — C71.3 MALIGNANT NEOPLASM OF PARIETAL LOBE (H): ICD-10-CM

## 2018-04-25 LAB
ALBUMIN SERPL-MCNC: 4.2 G/DL (ref 3.4–5)
ALP SERPL-CCNC: 134 U/L (ref 40–150)
ALT SERPL W P-5'-P-CCNC: 22 U/L (ref 0–50)
ANION GAP SERPL CALCULATED.3IONS-SCNC: 6 MMOL/L (ref 3–14)
AST SERPL W P-5'-P-CCNC: 13 U/L (ref 0–45)
BASOPHILS # BLD AUTO: 0.1 10E9/L (ref 0–0.2)
BASOPHILS NFR BLD AUTO: 1 %
BILIRUB SERPL-MCNC: 1.3 MG/DL (ref 0.2–1.3)
BUN SERPL-MCNC: 12 MG/DL (ref 7–30)
CALCIUM SERPL-MCNC: 9.7 MG/DL (ref 8.5–10.1)
CHLORIDE SERPL-SCNC: 106 MMOL/L (ref 94–109)
CO2 SERPL-SCNC: 27 MMOL/L (ref 20–32)
CREAT SERPL-MCNC: 0.52 MG/DL (ref 0.52–1.04)
DEPRECATED CALCIDIOL+CALCIFEROL SERPL-MC: 33 UG/L (ref 20–75)
DIFFERENTIAL METHOD BLD: ABNORMAL
EOSINOPHIL # BLD AUTO: 0.1 10E9/L (ref 0–0.7)
EOSINOPHIL NFR BLD AUTO: 1.9 %
ERYTHROCYTE [DISTWIDTH] IN BLOOD BY AUTOMATED COUNT: 13.2 % (ref 10–15)
GFR SERPL CREATININE-BSD FRML MDRD: >90 ML/MIN/1.7M2
GLUCOSE SERPL-MCNC: 94 MG/DL (ref 70–99)
HCT VFR BLD AUTO: 46.7 % (ref 35–47)
HGB BLD-MCNC: 14.9 G/DL (ref 11.7–15.7)
IMM GRANULOCYTES # BLD: 0 10E9/L (ref 0–0.4)
IMM GRANULOCYTES NFR BLD: 0.2 %
LYMPHOCYTES # BLD AUTO: 1.7 10E9/L (ref 0.8–5.3)
LYMPHOCYTES NFR BLD AUTO: 32.2 %
MCH RBC QN AUTO: 26.2 PG (ref 26.5–33)
MCHC RBC AUTO-ENTMCNC: 31.9 G/DL (ref 31.5–36.5)
MCV RBC AUTO: 82 FL (ref 78–100)
MONOCYTES # BLD AUTO: 0.4 10E9/L (ref 0–1.3)
MONOCYTES NFR BLD AUTO: 7.2 %
NEUTROPHILS # BLD AUTO: 3 10E9/L (ref 1.6–8.3)
NEUTROPHILS NFR BLD AUTO: 57.5 %
NRBC # BLD AUTO: 0 10*3/UL
NRBC BLD AUTO-RTO: 0 /100
PLATELET # BLD AUTO: 253 10E9/L (ref 150–450)
POTASSIUM SERPL-SCNC: 3.4 MMOL/L (ref 3.4–5.3)
PROT SERPL-MCNC: 8.6 G/DL (ref 6.8–8.8)
RBC # BLD AUTO: 5.69 10E12/L (ref 3.8–5.2)
SODIUM SERPL-SCNC: 139 MMOL/L (ref 133–144)
WBC # BLD AUTO: 5.2 10E9/L (ref 4–11)

## 2018-04-25 PROCEDURE — 82306 VITAMIN D 25 HYDROXY: CPT | Performed by: PSYCHIATRY & NEUROLOGY

## 2018-04-25 ASSESSMENT — PAIN SCALES - GENERAL: PAINLEVEL: NO PAIN (0)

## 2018-04-25 NOTE — LETTER
2018       RE: Suzan Ballard  [address]  [place] [address]     Dear Colleague,    Thank you for referring your patient, Suzan Ballard, to the [facility] NEUROLOGY at [facility]. Please see a copy of my visit note below.    Service Date: 2018      Lamont Kapadia Jr, MD   [facility]    [address]   [place] [address]      RE: Suzan Ballard   MRN: [id number]   : 1955      Dear Lamont:      This is in regard to followup on Suzan Ballard.  The patient returned today with chief complaint of known resected meningiomas as well as recurrent meningioma, seizure disorder, and constructional dyspraxia.      The patient did go over with me her current issues.  She is actually doing quite well, although she still notes some left/right orientation issues.  She last had psychometrics in .  She has also been involved in OT and PT evaluation in the past.  I offered her further evaluation now, but she declined.  I did go over with her her prior records and these did indicate that she had trouble consistent with a dominant left parietal lobe injury.  Specifically, Dr. Swanson noted that the patient not only had visual processing impairments that may reflect nondominant hemispheric parietal lobe involvement, but there was also indication of a more diffuse or global cerebral involvement when she was seen on 2013.  She did note too that the patient did have some slowed processing speed and some issues with cognitive decline, but there were probably psychologic factors that exacerbated these cognitive difficulties.  The patient did feel that overall she has improved since that time.  The patient did have some mild impairments also noted in executive functioning, including problem solving, perseverations, conceptualization, and impaired visual spatial abilities.  The patient is dominantly right-handed.  She is not driving  now.  She has had no recurrent seizures.  She has had no trouble taking medication including Lamictal.  She did tell me that she would prefer to have followup if needed in the Epilepsy Department with Dr. Land.  She did have some issues with sleep apnea in 2013 and she is using her CPAP.  She was on Keppra at one time, but was switched to Lamictal 150 mg b.i.d.  She does continue on Synthroid, amlodipine, simvastatin and rare Antivert.  She also is on what is probably 1000 international units of vitamin D3.  She had followup with Dr. Sauer in our Neurosurgery Department on 11/06/2017.  He did recommend that she have a followup MRI scan and it is scheduled for early 11/2018 and he will see her again.  I reviewed with her last MRI scan and did show her areas of encephalomalacia where she had had resection of tumor, but also recurrent tumor.  She did have blood work done on 07/22/2017 and that included a normal TSH of 2.14 and her bilirubin was elevated at 1.8, but she otherwise had normal liver function tests.  Her potassium was 3.2 and she has been eating potassium-rich foods.  The patient denied any other issues.      Neurologic examination revealed a pleasant woman.  Her initial blood pressure was 149/84 with a pulse of 74 using a machine.  Then with a soft large cuff, I was able to get a blood pressure of 140/80 with a pulse of 74.  Otherwise, she had normal gait, station, cerebellar testing, muscle stretch reflexes, cranial nerve examination, and cortical sensory testing.  This included graphesthesia, 2-point discrimination and double simultaneous stimulation as well as vibratory and position sense testing.  She knew the date.  She could recall 1/3 objects after 5 minutes and with prompting 2/3 objects.  She was able do simple calculations.  She did tell me the states that touch [place].  The patient could draw a clock correctly to approximately 4:30.  She had some trouble drawing intersecting pentagons.  I could  not auscultate cervical bruits, and she had a regular cardiac rhythm without gallops or murmurs.  She had full visual fields to confrontation.      IMPRESSION:   1.  Resected meningiomas, as well as residual and other meningiomas.   2.  Prior partial seizures which are well controlled with Lamictal.      The patient overall seems to be doing well.  She declined having further evaluation with psychometrics as well as OT and PT today.  She said she is able to do everything she wants to do at home and really has had no trouble with activities of daily living at all.  She has continued to travel to the [facility] at times at least yearly.  She reviewed with me her father's illness with prostate cancer and Alzheimer's.  She also did tell me again about losing her sister to a recurrent cerebral hemorrhage last year.  Overall, she seems to be doing well.  I told her I would be happy to see her in followup in 6-12 months and on a p.r.n. basis.      Thank you for allowing me to see this patient.      I spent 30 minutes with her today.  Over 50% of the time this involved counseling and coordination of care.      D: 2018   T: 2018   MT: LUIS      Name:     FLORESITA ROY   MRN:      [id number]        Account:      [id number]   :      1955           Service Date: 2018      Document: [id number]      Again, thank you for allowing me to participate in the care of your patient.      Sincerely,    Raji Perez MD

## 2018-04-25 NOTE — MR AVS SNAPSHOT
After Visit Summary   4/25/2018    Suzan Ballard    MRN: [id number]           Patient Information     Date Of Birth          1955        Visit Information        Provider Department      4/25/2018 9:00 AM Raji Perez MD [facility] Neurology        Today's Diagnoses     Partial epilepsy with impairment of consciousness, intractable (H)    -  1    Malignant neoplasm of parietal lobe (H)        Vitamin D deficiency           Follow-ups after your visit        Follow-up notes from your care team     Return in about 7 months (around 11/14/2018).      Your next 10 appointments already scheduled     Apr 25, 2018 10:15 AM CDT   LAB with [facility] ([facility])    [address] [place] [id number] [phone]           Please do not eat 10-12 hours before your appointment if you are coming in fasting for labs on lipids, cholesterol, or glucose (sugar). This does not apply to pregnant women. Water, hot tea and black coffee (with nothing added) are okay. Do not drink other fluids, diet soda or chew gum.            Nov 03, 2018  9:15 AM CDT   (Arrive by 9:00 AM)   MR BRAIN W/O & W CONTRAST with [facility] MRI ([facility])    [address] [place] [id number] [phone]           Take your medicines as usual, unless your doctor tells you not to. Bring a list of your current medicines to your exam (including vitamins, minerals and over-the-counter drugs).  You may or may not receive intravenous (IV) contrast for this exam pending the discretion of the Radiologist.  You do not need to do anything special to prepare.  The MRI machine uses a strong magnet. Please wear clothes without metal (snaps, zippers). A sweatsuit works well, or we may give you a hospital gown.  Please remove any body piercings and hair extensions before you arrive. You will also remove watches,  jewelry, hairpins, wallets, dentures, partial dental plates and hearing aids. You may wear contact lenses, and you may be able to wear your rings. We have a safe place to keep your personal items, but it is safer to leave them at home.  **IMPORTANT** THE INSTRUCTIONS BELOW ARE ONLY FOR THOSE PATIENTS WHO HAVE BEEN PRESCRIBED SEDATION OR GENERAL ANESTHESIA DURING THEIR MRI PROCEDURE:  IF YOUR DOCTOR PRESCRIBED ORAL SEDATION (take medicine to help you relax during your exam):   You must get the medicine from your doctor (oral medication) before you arrive. Bring the medicine to the exam. Do not take it at home. You ll be told when to take it upon arriving for your exam.   Arrive one hour early. Bring someone who can take you home after the test. Your medicine will make you sleepy. After the exam, you may not drive, take a bus or take a taxi by yourself.  IF YOUR DOCTOR PRESCRIBED IV SEDATION:   Arrive one hour early. Bring someone who can take you home after the test. Your medicine will make you sleepy. After the exam, you may not drive, take a bus or take a taxi by yourself.   No eating 6 hours before your exam. You may have clear liquids up until 4 hours before your exam. (Clear liquids include water, clear tea, black coffee and fruit juice without pulp.)  IF YOUR DOCTOR PRESCRIBED ANESTHESIA (be asleep for your exam):   Arrive 1 1/2 hours early. Bring someone who can take you home after the test. You may not drive, take a bus or take a taxi by yourself.   No eating 8 hours before your exam. You may have clear liquids up until 4 hours before your exam. (Clear liquids include water, clear tea, black coffee and fruit juice without pulp.)   You will spend four to five hours in the recovery room.  Please call the Imaging Department at your exam site with any questions.            Nov 05, 2018 11:30 AM CST   (Arrive by 11:15 AM)   Return Visit with Narinder Sauer MD   [facility] Neurosurgery ([facility])    [address] [id number] [phone]            Nov 07, 2018  9:00 AM CST   (Arrive by 8:45 AM)   Return Visit with Raji Perez MD   [facility] Neurology ([facility])    [address] [id number] [phone]              Future tests that were ordered for you today     Open Future Orders        Priority Expected Expires Ordered    Comprehensive metabolic panel Routine 4/30/2018 4/25/2019 4/25/2018    CBC with platelets differential Routine 4/30/2018 4/25/2019 4/25/2018    Vitamin D Deficiency Routine 4/30/2018 4/25/2019 4/25/2018            Who to contact     Please call your clinic at [phone] to:    Ask questions about your health    Make or cancel appointments    Discuss your medicines    Learn about your test results    Speak to your doctor            Additional Information About Your Visit        [employer] Information     [employer] gives you secure access to your electronic health record. If you see a primary care provider, you can also send messages to your care team and make appointments. If you have questions, please call your primary care clinic.  If you do not have a primary care provider, please call [phone] and they will assist you.      [employer] is an electronic gateway that provides easy, online access to your medical records. With [employer], you can request a clinic appointment, read your test results, renew a prescription or communicate with your care team.     To access your existing account, please contact your [facility] or call [phone] for assistance.        Care EveryWhere ID     This is your Care EveryWhere ID. This could be used by other organizations to access your [facility] medical records  [id number]        Your Vitals Were     Pulse Height BMI (Body Mass Index)             82 1.524 m (5') 29.2 kg/m2          Blood Pressure from  Last 3 Encounters:   04/25/18 149/84   11/06/17 139/80   10/28/17 142/80    Weight from Last 3 Encounters:   04/25/18 67.8 kg (149 lb 8 oz)   11/06/17 67.6 kg (149 lb 1.6 oz)   10/28/17 65.8 kg (145 lb)              We Performed the Following     Lamotrigine Level        Primary Care Provider Office Phone # Fax #    Lamont Kapadia [phone]       [facility] [address] [facility] [address]  [facility] [address]        Equal Access to Services     [facility]: Hadii aad ku hadasho Soomaali, waaxda luqadaha, qaybta kaalmada adeshaylee, delio howell . So [facility] [phone].    ATENCIÓN: Si habla español, tiene a becerril disposición servicios gratuitos de asistencia lingüística. [facility] [phone].    We comply with applicable federal civil rights laws and [place] laws. We do not discriminate on the basis of race, color, national origin, age, disability, sex, sexual orientation, or gender identity.            Thank you!     Thank you for choosing [facility] NEUROLOGY  for your care. Our goal is always to provide you with excellent care. Hearing back from our patients is one way we can continue to improve our services. Please take a few minutes to complete the written survey that you may receive in the mail after your visit with us. Thank you!             Your Updated Medication List - Protect others around you: Learn how to safely use, store and throw away your medicines at www.disposemymeds.org.          This list is accurate as of 4/25/18 10:14 AM.  Always use your most recent med list.                   Brand Name Dispense Instructions for use Diagnosis    AMLODIPINE BESYLATE PO      Take 5 mg by mouth daily    Benign neoplasm of cerebral meninges (H), Partial epilepsy with impairment of consciousness, intractable (H)       CALCIUM 500 + D PO      Take 1 tablet by mouth daily.        lamoTRIgine 100 MG tablet    LaMICtal    540 tablet    Take one and one half tablets twice per  day (total 300 mg per day)    Localization-related symptomatic epilepsy and epileptic syndromes with complex partial seizures, not intractable, without status epilepticus (H)       levothyroxine 75 MCG tablet    SYNTHROID/LEVOTHROID     Take 1 tablet by mouth daily.        meclizine 25 MG Chew      Take 25 mg by mouth every 6 hours as needed for dizziness    Benign neoplasm of cerebral meninges (H), Partial epilepsy with impairment of consciousness, intractable (H)       simvastatin 20 MG tablet    ZOCOR     Take 1 tablet by mouth daily.        TYLENOL ARTHRITIS PAIN 650 MG CR tablet   Generic drug:  acetaminophen      Take 650 mg by mouth as needed.

## 2018-04-26 LAB — LAMOTRIGINE SERPL-MCNC: 6.1 UG/ML (ref 2.5–15)

## 2018-04-27 ENCOUNTER — TELEPHONE (OUTPATIENT)
Dept: NEUROSURGERY | Facility: CLINIC | Age: 63
End: 2018-04-27

## 2018-04-27 NOTE — PROGRESS NOTES
Service Date: 2018      Lamont Kapadia Jr, MD   [facility]    [address]   [place] [address]      RE: Suzan Ballard   MRN: [id number]   : 1955      Dear Lamont:      This is in regard to followup on Suzan Ballard.  The patient returned today with chief complaint of known resected meningiomas as well as recurrent meningioma, seizure disorder, and constructional dyspraxia.      The patient did go over with me her current issues.  She is actually doing quite well, although she still notes some left/right orientation issues.  She last had psychometrics in .  She has also been involved in OT and PT evaluation in the past.  I offered her further evaluation now, but she declined.  I did go over with her her prior records and these did indicate that she had trouble consistent with a dominant left parietal lobe injury.  Specifically, Dr. Swanson noted that the patient not only had visual processing impairments that may reflect nondominant hemispheric parietal lobe involvement, but there was also indication of a more diffuse or global cerebral involvement when she was seen on 2013.  She did note too that the patient did have some slowed processing speed and some issues with cognitive decline, but there were probably psychologic factors that exacerbated these cognitive difficulties.  The patient did feel that overall she has improved since that time.  The patient did have some mild impairments also noted in executive functioning, including problem solving, perseverations, conceptualization, and impaired visual spatial abilities.  The patient is dominantly right-handed.  She is not driving now.  She has had no recurrent seizures.  She has had no trouble taking medication including Lamictal.  She did tell me that she would prefer to have followup if needed in the Epilepsy Department with Dr. Land.  She did have some issues with sleep apnea in  and she is using her  CPAP.  She was on Keppra at one time, but was switched to Lamictal 150 mg b.i.d.  She does continue on Synthroid, amlodipine, simvastatin and rare Antivert.  She also is on what is probably 1000 international units of vitamin D3.  She had followup with Dr. Sauer in our Neurosurgery Department on 11/06/2017.  He did recommend that she have a followup MRI scan and it is scheduled for early 11/2018 and he will see her again.  I reviewed with her last MRI scan and did show her areas of encephalomalacia where she had had resection of tumor, but also recurrent tumor.  She did have blood work done on 07/22/2017 and that included a normal TSH of 2.14 and her bilirubin was elevated at 1.8, but she otherwise had normal liver function tests.  Her potassium was 3.2 and she has been eating potassium-rich foods.  The patient denied any other issues.      Neurologic examination revealed a pleasant woman.  Her initial blood pressure was 149/84 with a pulse of 74 using a machine.  Then with a soft large cuff, I was able to get a blood pressure of 140/80 with a pulse of 74.  Otherwise, she had normal gait, station, cerebellar testing, muscle stretch reflexes, cranial nerve examination, and cortical sensory testing.  This included graphesthesia, 2-point discrimination and double simultaneous stimulation as well as vibratory and position sense testing.  She knew the date.  She could recall 1/3 objects after 5 minutes and with prompting 2/3 objects.  She was able do simple calculations.  She did tell me the states that touch [place].  The patient could draw a clock correctly to approximately 4:30.  She had some trouble drawing intersecting pentagons.  I could not auscultate cervical bruits, and she had a regular cardiac rhythm without gallops or murmurs.  She had full visual fields to confrontation.      IMPRESSION:   1.  Resected meningiomas, as well as residual and other meningiomas.   2.  Prior partial seizures which are well  controlled with Lamictal.      The patient overall seems to be doing well.  She declined having further evaluation with psychometrics as well as OT and PT today.  She said she is able to do everything she wants to do at home and really has had no trouble with activities of daily living at all.  She has continued to travel to the [facility] at times at least yearly.  She reviewed with me her father's illness with prostate cancer and Alzheimer's.  She also did tell me again about losing her sister to a recurrent cerebral hemorrhage last year.  Overall, she seems to be doing well.  I told her I would be happy to see her in followup in 6-12 months and on a p.r.n. basis.      Thank you for allowing me to see this patient.      Sincerely yours,      Tanisha Perez MD      I spent 30 minutes with her today.  Over 50% of the time this involved counseling and coordination of care.         TANISHA PEREZ MD             D: 2018   T: 2018   MT: LUIS      Name:     FLORESITA ROY   MRN:      [id number]        Account:      [id number]   :      1955           Service Date: 2018      Document: [id number]

## 2018-04-27 NOTE — TELEPHONE ENCOUNTER
----- Message from Melanie Barber RN sent at 4/27/2018  5:57 AM CDT -----      ----- Message -----     From: Raji Perez MD     Sent: 4/26/2018   4:06 PM       To: Melanie Barber RN    Tests are fine including lamictal level[good news]

## 2018-05-14 ENCOUNTER — OFFICE VISIT - HEALTHEAST (OUTPATIENT)
Dept: PODIATRY | Facility: CLINIC | Age: 63
End: 2018-05-14

## 2018-05-14 DIAGNOSIS — G57.60 MORTON'S METATARSALGIA, UNSPECIFIED LATERALITY: ICD-10-CM

## 2018-06-04 ENCOUNTER — OFFICE VISIT - HEALTHEAST (OUTPATIENT)
Dept: INTERNAL MEDICINE | Facility: CLINIC | Age: 63
End: 2018-06-04

## 2018-06-04 DIAGNOSIS — Z12.31 VISIT FOR SCREENING MAMMOGRAM: ICD-10-CM

## 2018-06-04 DIAGNOSIS — E78.5 HYPERLIPIDEMIA: ICD-10-CM

## 2018-06-04 DIAGNOSIS — I10 ESSENTIAL HYPERTENSION: ICD-10-CM

## 2018-06-04 DIAGNOSIS — E03.9 HYPOTHYROID: ICD-10-CM

## 2018-06-04 DIAGNOSIS — G57.60 MORTON'S NEUROMA: ICD-10-CM

## 2018-06-04 DIAGNOSIS — D32.0 BENIGN NEOPLASM OF CEREBRAL MENINGES (H): ICD-10-CM

## 2018-06-04 DIAGNOSIS — G40.909 SEIZURE DISORDER (H): ICD-10-CM

## 2018-06-04 LAB
ALBUMIN SERPL-MCNC: 4 G/DL (ref 3.5–5)
ALP SERPL-CCNC: 104 U/L (ref 45–120)
ALT SERPL W P-5'-P-CCNC: 14 U/L (ref 0–45)
ANION GAP SERPL CALCULATED.3IONS-SCNC: 9 MMOL/L (ref 5–18)
AST SERPL W P-5'-P-CCNC: 14 U/L (ref 0–40)
BILIRUB DIRECT SERPL-MCNC: 0.5 MG/DL
BILIRUB SERPL-MCNC: 1.7 MG/DL (ref 0–1)
BUN SERPL-MCNC: 14 MG/DL (ref 8–22)
CALCIUM SERPL-MCNC: 9.7 MG/DL (ref 8.5–10.5)
CHLORIDE BLD-SCNC: 109 MMOL/L (ref 98–107)
CHOLEST SERPL-MCNC: 153 MG/DL
CO2 SERPL-SCNC: 25 MMOL/L (ref 22–31)
CREAT SERPL-MCNC: 0.69 MG/DL (ref 0.6–1.1)
ERYTHROCYTE [DISTWIDTH] IN BLOOD BY AUTOMATED COUNT: 12.2 % (ref 11–14.5)
FASTING STATUS PATIENT QL REPORTED: YES
GFR SERPL CREATININE-BSD FRML MDRD: >60 ML/MIN/1.73M2
GLUCOSE BLD-MCNC: 130 MG/DL (ref 70–125)
HCT VFR BLD AUTO: 42.6 % (ref 35–47)
HDLC SERPL-MCNC: 55 MG/DL
HGB BLD-MCNC: 14 G/DL (ref 12–16)
LDLC SERPL CALC-MCNC: 80 MG/DL
MCH RBC QN AUTO: 26.7 PG (ref 27–34)
MCHC RBC AUTO-ENTMCNC: 32.9 G/DL (ref 32–36)
MCV RBC AUTO: 81 FL (ref 80–100)
PLATELET # BLD AUTO: 272 THOU/UL (ref 140–440)
PMV BLD AUTO: 6.9 FL (ref 7–10)
POTASSIUM BLD-SCNC: 3.6 MMOL/L (ref 3.5–5)
PROT SERPL-MCNC: 7.6 G/DL (ref 6–8)
RBC # BLD AUTO: 5.24 MILL/UL (ref 3.8–5.4)
SODIUM SERPL-SCNC: 143 MMOL/L (ref 136–145)
T4 FREE SERPL-MCNC: 1.3 NG/DL (ref 0.7–1.8)
TRIGL SERPL-MCNC: 89 MG/DL
TSH SERPL DL<=0.005 MIU/L-ACNC: 0.82 UIU/ML (ref 0.3–5)
WBC: 3.7 THOU/UL (ref 4–11)

## 2018-06-04 ASSESSMENT — MIFFLIN-ST. JEOR: SCORE: 1077.93

## 2018-06-06 ENCOUNTER — HOSPITAL ENCOUNTER (OUTPATIENT)
Dept: MAMMOGRAPHY | Facility: CLINIC | Age: 63
Discharge: HOME | End: 2018-06-06
Attending: INTERNAL MEDICINE

## 2018-06-06 ENCOUNTER — COMMUNICATION - HEALTHEAST (OUTPATIENT)
Dept: INTERNAL MEDICINE | Facility: CLINIC | Age: 63
End: 2018-06-06

## 2018-06-06 DIAGNOSIS — Z12.31 VISIT FOR SCREENING MAMMOGRAM: ICD-10-CM

## 2018-06-29 ENCOUNTER — CARE COORDINATION (OUTPATIENT)
Dept: NEUROLOGY | Facility: CLINIC | Age: 63
End: 2018-06-29

## 2018-07-09 ENCOUNTER — COMMUNICATION - HEALTHEAST (OUTPATIENT)
Dept: INTERNAL MEDICINE | Facility: CLINIC | Age: 63
End: 2018-07-09

## 2018-07-09 DIAGNOSIS — G40.909 SEIZURE DISORDER (H): ICD-10-CM

## 2018-07-11 ENCOUNTER — COMMUNICATION - HEALTHEAST (OUTPATIENT)
Dept: INTERNAL MEDICINE | Facility: CLINIC | Age: 63
End: 2018-07-11

## 2018-07-11 DIAGNOSIS — G40.909 SEIZURE DISORDER (H): ICD-10-CM

## 2018-08-08 ENCOUNTER — COMMUNICATION - HEALTHEAST (OUTPATIENT)
Dept: INTERNAL MEDICINE | Facility: CLINIC | Age: 63
End: 2018-08-08

## 2018-08-08 DIAGNOSIS — E78.5 HYPERLIPIDEMIA: ICD-10-CM

## 2018-09-04 ENCOUNTER — OFFICE VISIT - HEALTHEAST (OUTPATIENT)
Dept: INTERNAL MEDICINE | Facility: CLINIC | Age: 63
End: 2018-09-04

## 2018-09-04 DIAGNOSIS — G40.909 SEIZURE DISORDER (H): ICD-10-CM

## 2018-09-04 DIAGNOSIS — F43.22 ADJUSTMENT DISORDER WITH ANXIOUS MOOD: ICD-10-CM

## 2018-09-04 DIAGNOSIS — E78.5 HYPERLIPIDEMIA: ICD-10-CM

## 2018-09-04 DIAGNOSIS — I10 ESSENTIAL HYPERTENSION: ICD-10-CM

## 2018-09-04 ASSESSMENT — MIFFLIN-ST. JEOR: SCORE: 1055.25

## 2018-09-12 ENCOUNTER — OFFICE VISIT - HEALTHEAST (OUTPATIENT)
Dept: BEHAVIORAL HEALTH | Facility: CLINIC | Age: 63
End: 2018-09-12

## 2018-09-12 DIAGNOSIS — F43.23 ADJUSTMENT DISORDER WITH MIXED ANXIETY AND DEPRESSED MOOD: ICD-10-CM

## 2018-09-18 ENCOUNTER — OFFICE VISIT - HEALTHEAST (OUTPATIENT)
Dept: BEHAVIORAL HEALTH | Facility: CLINIC | Age: 63
End: 2018-09-18

## 2018-09-18 DIAGNOSIS — F43.23 ADJUSTMENT DISORDER WITH MIXED ANXIETY AND DEPRESSED MOOD: ICD-10-CM

## 2018-09-25 ENCOUNTER — OFFICE VISIT - HEALTHEAST (OUTPATIENT)
Dept: BEHAVIORAL HEALTH | Facility: CLINIC | Age: 63
End: 2018-09-25

## 2018-09-25 DIAGNOSIS — F43.23 ADJUSTMENT DISORDER WITH MIXED ANXIETY AND DEPRESSED MOOD: ICD-10-CM

## 2018-10-02 ENCOUNTER — AMBULATORY - HEALTHEAST (OUTPATIENT)
Dept: BEHAVIORAL HEALTH | Facility: CLINIC | Age: 63
End: 2018-10-02

## 2018-10-02 ENCOUNTER — OFFICE VISIT - HEALTHEAST (OUTPATIENT)
Dept: BEHAVIORAL HEALTH | Facility: CLINIC | Age: 63
End: 2018-10-02

## 2018-10-02 DIAGNOSIS — F43.23 ADJUSTMENT DISORDER WITH MIXED ANXIETY AND DEPRESSED MOOD: ICD-10-CM

## 2018-10-10 ENCOUNTER — OFFICE VISIT - HEALTHEAST (OUTPATIENT)
Dept: INTERNAL MEDICINE | Facility: CLINIC | Age: 63
End: 2018-10-10

## 2018-10-10 DIAGNOSIS — I10 ESSENTIAL HYPERTENSION: ICD-10-CM

## 2018-10-10 DIAGNOSIS — G47.33 OSA (OBSTRUCTIVE SLEEP APNEA): ICD-10-CM

## 2018-10-10 DIAGNOSIS — Z23 NEED FOR IMMUNIZATION AGAINST INFLUENZA: ICD-10-CM

## 2018-10-10 DIAGNOSIS — E78.5 HYPERLIPIDEMIA: ICD-10-CM

## 2018-10-10 DIAGNOSIS — F43.22 ADJUSTMENT DISORDER WITH ANXIOUS MOOD: ICD-10-CM

## 2018-10-10 ASSESSMENT — MIFFLIN-ST. JEOR: SCORE: 1041.64

## 2018-10-11 ENCOUNTER — OFFICE VISIT - HEALTHEAST (OUTPATIENT)
Dept: BEHAVIORAL HEALTH | Facility: CLINIC | Age: 63
End: 2018-10-11

## 2018-10-11 DIAGNOSIS — F43.23 ADJUSTMENT DISORDER WITH MIXED ANXIETY AND DEPRESSED MOOD: ICD-10-CM

## 2018-10-17 ENCOUNTER — OFFICE VISIT - HEALTHEAST (OUTPATIENT)
Dept: BEHAVIORAL HEALTH | Facility: CLINIC | Age: 63
End: 2018-10-17

## 2018-10-17 DIAGNOSIS — F43.23 ADJUSTMENT DISORDER WITH MIXED ANXIETY AND DEPRESSED MOOD: ICD-10-CM

## 2018-10-23 ENCOUNTER — OFFICE VISIT - HEALTHEAST (OUTPATIENT)
Dept: BEHAVIORAL HEALTH | Facility: CLINIC | Age: 63
End: 2018-10-23

## 2018-10-23 DIAGNOSIS — F43.23 ADJUSTMENT DISORDER WITH MIXED ANXIETY AND DEPRESSED MOOD: ICD-10-CM

## 2018-10-30 ENCOUNTER — OFFICE VISIT - HEALTHEAST (OUTPATIENT)
Dept: BEHAVIORAL HEALTH | Facility: CLINIC | Age: 63
End: 2018-10-30

## 2018-10-30 DIAGNOSIS — F43.23 ADJUSTMENT DISORDER WITH MIXED ANXIETY AND DEPRESSED MOOD: ICD-10-CM

## 2018-11-03 ENCOUNTER — PATIENT OUTREACH (OUTPATIENT)
Dept: CARE COORDINATION | Facility: CLINIC | Age: 63
End: 2018-11-03

## 2018-11-04 ENCOUNTER — RADIANT APPOINTMENT (OUTPATIENT)
Dept: MRI IMAGING | Facility: CLINIC | Age: 63
End: 2018-11-04
Attending: NEUROLOGICAL SURGERY
Payer: MEDICARE

## 2018-11-04 RX ORDER — GADOBUTROL 604.72 MG/ML
7.5 INJECTION INTRAVENOUS ONCE
Status: COMPLETED | OUTPATIENT
Start: 2018-11-04 | End: 2018-11-04

## 2018-11-04 RX ADMIN — GADOBUTROL 6.5 ML: 604.72 INJECTION INTRAVENOUS at 07:29

## 2018-11-04 NOTE — DISCHARGE INSTRUCTIONS
MRI Contrast Discharge Instructions    The IV contrast you received today will pass out of your body in your  urine. This will happen in the next 24 hours. You will not feel this process.  Your urine will not change color.    Drink at least 4 extra glasses of water or juice today (unless your doctor  has restricted your fluids). This reduces the stress on your kidneys.  You may take your regular medicines.    If you are on dialysis: It is best to have dialysis today.    If you have a reaction: Most reactions happen right away. If you have  any new symptoms after leaving the hospital (such as hives or swelling),  call your hospital at the correct number below. Or call your family doctor.  If you have breathing distress or wheezing, call 911.    Special instructions: ***    I have read and understand the above information.    Signature:______________________________________ Date:___________    Staff:__________________________________________ Date:___________     Time:__________    [place] [facility] Departments:    ___[place]: [phone]  ___[facility]: [phone]  ___[place]: [phone] ___[facility]: [phone]  ___[facility]: [phone]  ___[facility]: [phone]  ___[phone]  ___[facility]: [phone]  ___[phone]

## 2018-11-05 ENCOUNTER — OFFICE VISIT (OUTPATIENT)
Dept: NEUROSURGERY | Facility: CLINIC | Age: 63
End: 2018-11-05
Payer: MEDICARE

## 2018-11-05 ENCOUNTER — RECORDS - HEALTHEAST (OUTPATIENT)
Dept: ADMINISTRATIVE | Facility: OTHER | Age: 63
End: 2018-11-05

## 2018-11-05 VITALS
BODY MASS INDEX: 24.74 KG/M2 | OXYGEN SATURATION: 96 % | WEIGHT: 126 LBS | DIASTOLIC BLOOD PRESSURE: 78 MMHG | HEART RATE: 67 BPM | SYSTOLIC BLOOD PRESSURE: 125 MMHG | HEIGHT: 60 IN

## 2018-11-05 DIAGNOSIS — D32.9 MENINGIOMA (H): Primary | ICD-10-CM

## 2018-11-05 RX ORDER — FLUTICASONE PROPIONATE 50 MCG
SPRAY, SUSPENSION (ML) NASAL
COMMUNITY
Start: 2018-01-30

## 2018-11-05 ASSESSMENT — PATIENT HEALTH QUESTIONNAIRE - PHQ9
10. IF YOU CHECKED OFF ANY PROBLEMS, HOW DIFFICULT HAVE THESE PROBLEMS MADE IT FOR YOU TO DO YOUR WORK, TAKE CARE OF THINGS AT HOME, OR GET ALONG WITH OTHER PEOPLE: NOT DIFFICULT AT ALL
SUM OF ALL RESPONSES TO PHQ QUESTIONS 1-9: 4
SUM OF ALL RESPONSES TO PHQ QUESTIONS 1-9: 4

## 2018-11-05 ASSESSMENT — PAIN SCALES - GENERAL: PAINLEVEL: NO PAIN (0)

## 2018-11-05 NOTE — MR AVS SNAPSHOT
After Visit Summary   11/5/2018    Suzan Ballard    MRN: [id number]           Patient Information     Date Of Birth          1955        Visit Information        Provider Department      11/5/2018 11:30 AM Narinder Sauer MD [facility] Neurosurgery        Today's Diagnoses     Meningioma (H)    -  1       Follow-ups after your visit        Follow-up notes from your care team     Return in about 1 year (around 11/5/2019) for Imaging.      Your next 10 appointments already scheduled     Nov 07, 2018  9:00 AM CST   (Arrive by 8:45 AM)   Return Visit with Raji Perez MD   [facility] Neurology ([facility])    [address]  3rd Floor  [facility] [address] [phone]            Nov 02, 2019  9:15 AM CDT   MR BRAIN W/O & W CONTRAST with [address] MRI ([facility])    [address] [address] [phone]           How do I prepare for my exam? (Food and drink instructions) **If you will be receiving sedation or general anesthesia, please see special notes below.**  How do I prepare for my exam? (Other instructions) Take your medicines as usual, unless your doctor tells you not to. You may or may not receive intravenous (IV) contrast for this exam pending the discretion of the Radiologist.  You do not need to do anything special to prepare.  **If you will be receiving sedation or general anesthesia, please see special notes below.**  What should I wear: The MRI machine uses a strong magnet. Please wear clothes without metal (snaps, zippers). A sweatsuit works well, or we may give you a hospital gown. Please remove any body piercings and hair extensions before you arrive. You will also remove watches, jewelry, hairpins, wallets, dentures, partial dental plates and hearing aids. You may wear contact lenses, and you may be able to wear your rings. We have a safe place to keep  your personal items, but it is safer to leave them at home.  How long does the exam take: Most tests take 30 to 60 minutes.  HOWEVER, IF YOUR DOCTOR PRESCRIBES ANESTHESIA please plan on spending four to five hours in the recovery room.  What should I bring:  Bring a list of your current medicines to your exam (including vitamins, minerals and over-the-counter drugs).  Do I need a :  **If you will be receiving sedation or general anesthesia, please see special notes below.**  What should I do after the exam: No Restrictions, You may resume normal activities.  What is this test: MRI (magnetic resonance imaging) uses a strong magnet and radio waves to look inside the body. An MRA (magnetic resonance angiogram) does the same thing, but it lets us look at your blood vessels. A computer turns the radio waves into pictures showing cross sections of the body, much like slices of bread. This helps us see any problems more clearly. You may receive fluid (called  contrast ) before or during your scan. The fluid helps us see the pictures better. We give the fluid through an IV (small needle in your arm).  Who should I call with questions:  Please call the Imaging Department at your exam site with any questions. Directions, parking instructions, and other information is available on our website, [email or web address].  How do I prepare if I m having sedation or anesthesia? **IMPORTANT** THE INSTRUCTIONS BELOW ARE ONLY FOR THOSE PATIENTS WHO HAVE BEEN TOLD THEY WILL RECEIVE SEDATION OR GENERAL ANESTHESIA DURING THEIR MRI PROCEDURE:  IF YOU WILL RECEIVE SEDATION (take medicine to help you relax during your exam): You must get the medicine from your doctor before you arrive. Bring the medicine to the exam. Do not take it at home. Arrive one hour early. Bring someone who can take you home after the test. Your medicine will make you sleepy. After the exam, you may not drive, take a bus or take a taxi by yourself. No eating 8  hours before your exam. You may have clear liquids up until 4 hours before your exam. (Clear liquids include water, clear tea, black coffee and fruit juice without pulp.)  IF YOU WILL RECEIVE ANESTHESIA (be asleep for your exam): Arrive 1 1/2 hours early. Bring someone who can take you home after the test. You may not drive, take a bus or take a taxi by yourself. No eating 8 hours before your exam. You may have clear liquids up until 4 hours before your exam. (Clear liquids include water, clear tea, black coffee and fruit juice without pulp.)            Nov 04, 2019 11:15 AM CST   (Arrive by 11:00 AM)   Return Visit with Narinder Sauer MD   [facility] Neurosurgery ([facility] [place])    [address] [address] [phone]              Future tests that were ordered for you today     Open Future Orders        Priority Expected Expires Ordered    MR Brain w/o & w Contrast Routine 11/5/2019 11/5/2019 11/5/2018            Who to contact     Please call your clinic at [phone] to:    Ask questions about your health    Make or cancel appointments    Discuss your medicines    Learn about your test results    Speak to your doctor            Additional Information About Your Visit        [employer] Information     [employer] gives you secure access to your electronic health record. If you see a primary care provider, you can also send messages to your care team and make appointments. If you have questions, please call your primary care clinic.  If you do not have a primary care provider, please call [phone] and they will assist you.      [employer] is an electronic gateway that provides easy, online access to your medical records. With [employer], you can request a clinic appointment, read your test results, renew a prescription or communicate with your care team.     To access your existing account, please contact your [facility] or call  [phone] for assistance.        Care EveryWhere ID     This is your Care EveryWhere ID. This could be used by other organizations to access your [facility] medical records  [id number]        Your Vitals Were     Pulse Height Pulse Oximetry BMI (Body Mass Index)          67 1.524 m (5') 96% 24.61 kg/m2         Blood Pressure from Last 3 Encounters:   11/05/18 125/78   04/25/18 149/84   11/06/17 139/80    Weight from Last 3 Encounters:   11/05/18 57.2 kg (126 lb)   04/25/18 67.8 kg (149 lb 8 oz)   11/06/17 67.6 kg (149 lb 1.6 oz)               Primary Care Provider Office Phone # Fax #    Lamont Kapadia [phone]       [facility] [address] [place] [address]        Equal Access to Services     [facility] Center[facility]ÓNICA : Hadii aad ku hadasho Soross, waaxda luqadaha, qaybta kaalmada adeegyada, waxay pb haykhalida howell . So [facility] [phone].    ATENCIÓN: Si habla español, tiene a becerril disposición servicios gratuitos de asistencia lingüística. Wilfred al [phone].    We comply with applicable federal civil rights laws and [place] laws. We do not discriminate on the basis of race, color, national origin, age, disability, sex, sexual orientation, or gender identity.            Thank you!     Thank you for choosing [facility]  for your care. Our goal is always to provide you with excellent care. Hearing back from our patients is one way we can continue to improve our services. Please take a few minutes to complete the written survey that you may receive in the mail after your visit with us. Thank you!             Your Updated Medication List - Protect others around you: Learn how to safely use, store and throw away your medicines at www.disposemymeds.org.          This list is accurate as of 11/5/18 12:35 PM.  Always use your most recent med list.                   Brand Name Dispense Instructions for use Diagnosis    AMLODIPINE BESYLATE PO      Take 5  mg by mouth daily    Benign neoplasm of cerebral meninges (H), Partial epilepsy with impairment of consciousness, intractable (H)       CALCIUM 500 + D PO      Take 1 tablet by mouth daily.        fluticasone 50 MCG/ACT spray    FLONASE     USE 1 SPRAY IN EACH NOSTRIL EVERY DAY; SHAKE BEFORE USE    Meningioma (H)       lamoTRIgine 100 MG tablet    LaMICtal    540 tablet    Take one and one half tablets twice per day (total 300 mg per day)    Localization-related symptomatic epilepsy and epileptic syndromes with complex partial seizures, not intractable, without status epilepticus (H)       levothyroxine 75 MCG tablet    SYNTHROID/LEVOTHROID     Take 1 tablet by mouth daily.        meclizine 25 MG Chew      Take 25 mg by mouth every 6 hours as needed for dizziness    Benign neoplasm of cerebral meninges (H), Partial epilepsy with impairment of consciousness, intractable (H)       simvastatin 20 MG tablet    ZOCOR     Take 1 tablet by mouth daily.        TYLENOL ARTHRITIS PAIN 650 MG CR tablet   Generic drug:  acetaminophen      Take 650 mg by mouth as needed.

## 2018-11-05 NOTE — NURSING NOTE
Chief Complaint   Patient presents with     RECHECK     BENIGN NEOPLASM OF CEREBRAL MENINGES       Roseann Lindsey MA

## 2018-11-05 NOTE — LETTER
11/5/2018       RE: Suzan Ballard  [address]  [place] [address]     Dear Colleague,    Thank you for referring your patient, Suzan Ballard, to the [facility] NEUROSURGERY at [facility]. Please see a copy of my visit note below.    11/5/2018  Neurosurgery Clinic Visit - Return    Subjective:  Ms. 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 States her dizziness is stable over the past year, but her memory is somewhat worse. Describes a transient headache near the top of her head near the incision, and also near the R temporal area. The headaches are worse if she rests her head on the R side. At her last visit in 2017, options of Gamma Knife vs surgical resection vs watchful waiting were discussed, and patient opted for watchful waiting. Denies new visual changes, nausea/vomiting, weakness, or numbness.    Objective:   /78 (BP Location: Right arm)  Pulse 67  Ht 1.524 m (5')  Wt 57.2 kg (126 lb)  SpO2 96%  BMI 24.61 kg/m2    General: Awake and alert and in no acute distress, slightly nervous about imaging results.  Pulm: Breathing comfortably on room air  CN: Symmetric browlift, smile, tongue protrusion, palate elevation, and sternocleidomastoids. No dysarthria. Extraocular muscles are all intact. Pupils react bilaterally and equally  Coordination: Intact finger-nose-finger bilaterally.  Motor: No pronator drift, but arms move vertically in an alternating fashion. Good muscle bulk throughout. 4+ out of 5 strength in right upper and lower extremities, 4- in left upper and lower extremities.  Sensation: Sensation grossly intact to light touch in all extremities.  Gait: Intact tandem gait.  Reflexes: 2+ reflexes bilateral biceps, brachioradialis, and patellar tendons. Fuentes's reflex negative. Babinski reflex negative. Bilateral clonus negative.     Imaging:  MRI brain 11/4/18:  1.  Slight increase in size of posterior superior sagittal sinus meningioma since 8/13/2017.  2. Stable tiny high right frontal parafalcine meningioma.  3. Stable thin plaque-like enhancement subjacent to the left parietal cranioplasty defect dating back to 8/5/2014, likely postoperative. No convincing evidence of locally recurrent tumor.  4. Slight increase in size and change in central signal characteristics of small left parietal cavernous malformation, suggestive of interval hemorrhage.  5. Cerebral and cerebellar chronic punctate microhemorrhages, slightly increased in the right frontal lobe.    Assessment:    # Hx recurrent falcine meningiomas s/p resection x2 in 1995, 2012/13, tiny R frontal parafalcine meningioma stable, posterior falcine meningioma slightly increased in size    Plan:    - Discussed options of Gamma Knife vs surgical resection vs watchful waiting. Plan for continued watchful waiting at this time. Follow up with MRI brain in 1 year.    Patient seen and discussed with Dr. Narinder Sauer.    Jhon Hebert MD  Neurosurgery Resident PGY1    I saw the patient with the resident.  I have reviewed and edited the resident note and agree with the plan of care.        Again, thank you for allowing me to participate in the care of your patient.      Sincerely,    Narinder Sauer MD

## 2018-11-05 NOTE — PROGRESS NOTES
11/5/2018  Neurosurgery Clinic Visit - Return    Subjective:  Ms. 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 States her dizziness is stable over the past year, but her memory is somewhat worse. Describes a transient headache near the top of her head near the incision, and also near the R temporal area. The headaches are worse if she rests her head on the R side. At her last visit in 2017, options of Gamma Knife vs surgical resection vs watchful waiting were discussed, and patient opted for watchful waiting. Denies new visual changes, nausea/vomiting, weakness, or numbness.    Objective:   /78 (BP Location: Right arm)  Pulse 67  Ht 1.524 m (5')  Wt 57.2 kg (126 lb)  SpO2 96%  BMI 24.61 kg/m2    General: Awake and alert and in no acute distress, slightly nervous about imaging results.  Pulm: Breathing comfortably on room air  CN: Symmetric browlift, smile, tongue protrusion, palate elevation, and sternocleidomastoids. No dysarthria. Extraocular muscles are all intact. Pupils react bilaterally and equally  Coordination: Intact finger-nose-finger bilaterally.  Motor: No pronator drift, but arms move vertically in an alternating fashion. Good muscle bulk throughout. 4+ out of 5 strength in right upper and lower extremities, 4- in left upper and lower extremities.  Sensation: Sensation grossly intact to light touch in all extremities.  Gait: Intact tandem gait.  Reflexes: 2+ reflexes bilateral biceps, brachioradialis, and patellar tendons. Fuentes's reflex negative. Babinski reflex negative. Bilateral clonus negative.     Imaging:  MRI brain 11/4/18:  1. Slight increase in size of posterior superior sagittal sinus meningioma since 8/13/2017.  2. Stable tiny high right frontal parafalcine meningioma.  3. Stable thin plaque-like enhancement subjacent to the left parietal cranioplasty defect dating back to 8/5/2014, likely postoperative.  No convincing evidence of locally recurrent tumor.  4. Slight increase in size and change in central signal characteristics of small left parietal cavernous malformation, suggestive of interval hemorrhage.  5. Cerebral and cerebellar chronic punctate microhemorrhages, slightly increased in the right frontal lobe.    Assessment:    # Hx recurrent falcine meningiomas s/p resection x2 in 1995, 2012/13, tiny R frontal parafalcine meningioma stable, posterior falcine meningioma slightly increased in size    Plan:    - Discussed options of Gamma Knife vs surgical resection vs watchful waiting. Plan for continued watchful waiting at this time. Follow up with MRI brain in 1 year.    Patient seen and discussed with Dr. Narinder Sauer.    Jhon Hebert MD  Neurosurgery Resident PGY1    I saw the patient with the resident.  I have reviewed and edited the resident note and agree with the plan of care.      Narinder Sauer MD   Answers for HPI/ROS submitted by the patient on 11/5/2018   PHQ-2 Score: 3  If you checked off any problems, how difficult have these problems made it for you to do your work, take care of things at home, or get along with other people?: Not difficult at all  PHQ9 TOTAL SCORE: 4

## 2018-11-06 ASSESSMENT — PATIENT HEALTH QUESTIONNAIRE - PHQ9: SUM OF ALL RESPONSES TO PHQ QUESTIONS 1-9: 4

## 2018-11-07 ENCOUNTER — APPOINTMENT (OUTPATIENT)
Dept: LAB | Facility: CLINIC | Age: 63
End: 2018-11-07
Payer: MEDICARE

## 2018-11-07 ENCOUNTER — OFFICE VISIT (OUTPATIENT)
Dept: NEUROLOGY | Facility: CLINIC | Age: 63
End: 2018-11-07
Payer: MEDICARE

## 2018-11-07 ENCOUNTER — RECORDS - HEALTHEAST (OUTPATIENT)
Dept: ADMINISTRATIVE | Facility: OTHER | Age: 63
End: 2018-11-07

## 2018-11-07 VITALS
WEIGHT: 126 LBS | HEIGHT: 60 IN | HEART RATE: 64 BPM | DIASTOLIC BLOOD PRESSURE: 74 MMHG | BODY MASS INDEX: 24.74 KG/M2 | OXYGEN SATURATION: 97 % | SYSTOLIC BLOOD PRESSURE: 131 MMHG

## 2018-11-07 DIAGNOSIS — G40.209 LOCALIZATION-RELATED SYMPTOMATIC EPILEPSY AND EPILEPTIC SYNDROMES WITH COMPLEX PARTIAL SEIZURES, NOT INTRACTABLE, WITHOUT STATUS EPILEPTICUS (H): ICD-10-CM

## 2018-11-07 DIAGNOSIS — G40.219 PARTIAL EPILEPSY WITH IMPAIRMENT OF CONSCIOUSNESS, INTRACTABLE (H): Primary | ICD-10-CM

## 2018-11-07 RX ORDER — LAMOTRIGINE 100 MG/1
TABLET ORAL
Qty: 540 TABLET | Refills: 3 | Status: SHIPPED | OUTPATIENT
Start: 2018-11-07 | End: 2022-03-24

## 2018-11-07 ASSESSMENT — PAIN SCALES - GENERAL: PAINLEVEL: NO PAIN (0)

## 2018-11-07 NOTE — NURSING NOTE
Chief Complaint   Patient presents with     RECHECK     7 MONTH FOLLOW UP   STATES SHE SENT IN FORM FOR DRIVING CLEARANCE RENEWAL A WHILE AGO AND HASN'T HEARD ANYTHING BACK.     Roseann Lindsey MA

## 2018-11-07 NOTE — LETTER
2018       RE: Suzan Ballard  [address]  [place] [address]     Dear Colleague,    Thank you for referring your patient, Suzan Ballard, to the [facility] NEUROLOGY at [facility]. Please see a copy of my visit note below.    Service Date: 2018        RE: Suzan Ballard   MRN: [id number]   : 1955      Dear Lamont:      This is in regard to followup on Suzan Ballard.  The patient returned today on 2018.  Her chief complaint is that of recurrent meningiomas as well as seizures.      The patient said that she has been stable since I last saw her.  She did have follow up with Dr. Sauer, our [facility] neurosurgeon.  Unfortunately, her meningioma is slightly larger.  There has been a slight increase in the posterior superior sagittal sinus meningioma since the last study of 2017.  She continues to have a stable high tiny right frontal parafalcine meningioma.  I reassured her about these findings and she is going to continue to have conservative followup.  She is aware that she may be a candidate for Gamma Knife treatment.  She will have a followup MRI scan next fall and see Dr. Sauer again.  She did have a slight increase in size of central signal characteristic of a small left parietal cavernous malformation and the radiologist suggested there could have been an interval hemorrhage.  She is not aware of any change in her neurologic condition and she feels that she is stable.  Dr. Sauer reviewed all these findings with her on the visit of 2018.  The patient does not drive much, but if she does, she avoids the freeway.  She did tell me that she has been faithful about taking her anticonvulsants and she has not had any recent seizures.  She had seen Dr. Ledesma in the past, our [facility] epileptologist.  She would prefer to continue to be seen in the General Neurology Clinic.  She did have tonic-clonic seizures with amnesia when she had had previous  issues and treatment through [facility].  The patient has not had any of those spells since that hospitalization and she has not had any simple partial seizures involving the right arm or face.  She has done well on her Lamictal and has not had any trouble taking the medicine.  The patient did discuss with me her current stress and I believe you are aware of it.  She was able to ventilate about her marriage and she is having continued psychologic therapy.  She is undecided at this point whether she will leave her .  She did note that her 2 children and family members are aware of the situation and are extremely supportive to her.  She denied any suicidal issues.  The patient did note at times it has been hard for her to sleep because of her stress, but also she notes that it is hard for her to sleep against her craniotomy site because of discomfort.  She did deny any persistent headache though.  She has not had any stroke-like symptoms including focal weakness or paresthesias nor any imbalance, and she has not had any visual disturbance nor any significant problems with speech.  She has had psychometric evaluations in the past and I have invited her to have formal followup studies, but she said she is doing well and has had really no trouble with day to day activities including running her household.  The patient did recall when she had heard about initial issues that had taken place with her  in the [facility] that she did have a generalized chill and had some shaking, but did not lose consciousness.  She did tell me this was probably a stress-effect after she had noted his issues that were recorded on his cell phone.  She did discuss with me her family living here and other members in the [facility].  She has not visited her father for 2 years.  Her mother is still fine.  Her father does have continued care because of his Alzheimer disease and prostate cancer.  The patient did tell me  that she has not been using her CPAP because her face mask is broken and she is going to have a new one now.        CURRENT MEDICATIONS:   She continues on:   1.  Amlodipine.     2.  Calcium.     3.  Vitamin D.     4.  Flonase.     5.  Lamictal 150 mg b.i.d.   6.  Levothyroxine.     7.  Simvastatin.   8.  Tylenol Arthritis.        She is due for followup Lamictal level.  Her last was done on 01/30/2018 and was therapeutic at 6.2.  On 09/03/2018, she had normal metabolic profile.  On 06/04/2018, she had normal liver function tests except for a bilirubin of 1.7.  Her blood count showed hemoglobin o 14 grams and a white count of 3700.  She had normal thyroid indices on that date.      Neurologic examination revealed a pleasant woman.  Her blood pressure is 131/74 with a pulse of 64.  She had normal gait, and she was able to do tandem.  Muscle stretch reflexes were present and symmetric except for absent ankle jerks.  Her toes are downgoing to plantar stimulation.  Strength testing was all normal.  She had no dysmetria.  The patient's cranial nerve examination was unremarkable and despite her resected meningioma and cavernous hemangioma findings on MRI, she had no obvious visual field deficit.  Her Mini-Cog showed that she was oriented x3 to time, place and person.  She could initially recall 1/3 objects after 5 minutes and then with prompting 3/3 objects.  She could tell me about recent world affairs.  The patient had trouble telling me the states around [place] and said [place] and [place], but then said [place] and [place] and then laughed and said she just was not aware of the geography here.  She could do calculations well.  She has no finger recognition issues or left/right orientation problems.  The patient was able to draw a clock correctly to indicate 4:30.      The patient did tell me that if she does have a headache about once per month she takes Tylenol or ibuprofen with good relief.       IMPRESSION:   1.  Prior resected meningiomas as well as residual ones and increase in interval size of a sagittal sinus meningioma.   2.  Known cavernous hemangioma.   3.  Prior partial and generalized seizures, which seem to be well controlled.      The patient does want to continue to have neurologic followup here through the [facility] General Neurology program.  I have asked that she be seen again in a year and on a p.r.n. basis.         I spent 30 minutes with the patient today.  Over 50% of the time this involved counseling and coordination of care.        D: 2018   T: 2018   MT: LUIS      Name:     FLORESITA ROY   MRN:      [id number]        Account:      [id number]   :      1955           Service Date: 2018      Document: [id number]       Again, thank you for allowing me to participate in the care of your patient.      Sincerely,    Raji Perez MD    CC:  Lamont Kapadia Jr, MD   [facility]    [address] [address]

## 2018-11-07 NOTE — MR AVS SNAPSHOT
After Visit Summary   11/7/2018    Suzan Ballard    MRN: [id number]           Patient Information     Date Of Birth          1955        Visit Information        Provider Department      11/7/2018 9:00 AM Raji Perez MD [facility] Neurology        Today's Diagnoses     Partial epilepsy with impairment of consciousness, intractable (H)    -  1    Localization-related symptomatic epilepsy and epileptic syndromes with complex partial seizures, not intractable, without status epilepticus (H)           Follow-ups after your visit        Follow-up notes from your care team     Return in about 1 year (around 11/7/2019).      Your next 10 appointments already scheduled     Nov 07, 2018 10:45 AM CST   LAB with [facility] ([facility])    [address] [id number] [phone]           Please do not eat 10-12 hours before your appointment if you are coming in fasting for labs on lipids, cholesterol, or glucose (sugar). This does not apply to pregnant women. Water, hot tea and black coffee (with nothing added) are okay. Do not drink other fluids, diet soda or chew gum.            Nov 02, 2019  9:15 AM CDT   MR BRAIN W/O & W CONTRAST with [address] [facility] ([facility])    [address] [id number] [phone]           How do I prepare for my exam? (Food and drink instructions) **If you will be receiving sedation or general anesthesia, please see special notes below.**  How do I prepare for my exam? (Other instructions) Take your medicines as usual, unless your doctor tells you not to. You may or may not receive intravenous (IV) contrast for this exam pending the discretion of the Radiologist.  You do not need to do anything special to prepare.  **If you will be receiving sedation or general anesthesia, please see special notes below.**  What should I  wear: The MRI machine uses a strong magnet. Please wear clothes without metal (snaps, zippers). A sweatsuit works well, or we may give you a hospital gown. Please remove any body piercings and hair extensions before you arrive. You will also remove watches, jewelry, hairpins, wallets, dentures, partial dental plates and hearing aids. You may wear contact lenses, and you may be able to wear your rings. We have a safe place to keep your personal items, but it is safer to leave them at home.  How long does the exam take: Most tests take 30 to 60 minutes.  HOWEVER, IF YOUR DOCTOR PRESCRIBES ANESTHESIA please plan on spending four to five hours in the recovery room.  What should I bring:  Bring a list of your current medicines to your exam (including vitamins, minerals and over-the-counter drugs).  Do I need a :  **If you will be receiving sedation or general anesthesia, please see special notes below.**  What should I do after the exam: No Restrictions, You may resume normal activities.  What is this test: MRI (magnetic resonance imaging) uses a strong magnet and radio waves to look inside the body. An MRA (magnetic resonance angiogram) does the same thing, but it lets us look at your blood vessels. A computer turns the radio waves into pictures showing cross sections of the body, much like slices of bread. This helps us see any problems more clearly. You may receive fluid (called  contrast ) before or during your scan. The fluid helps us see the pictures better. We give the fluid through an IV (small needle in your arm).  Who should I call with questions:  Please call the Imaging Department at your exam site with any questions. Directions, parking instructions, and other information is available on our website, [email or web address].  How do I prepare if I m having sedation or anesthesia? **IMPORTANT** THE INSTRUCTIONS BELOW ARE ONLY FOR THOSE PATIENTS WHO HAVE BEEN TOLD THEY WILL RECEIVE SEDATION OR GENERAL  ANESTHESIA DURING THEIR MRI PROCEDURE:  IF YOU WILL RECEIVE SEDATION (take medicine to help you relax during your exam): You must get the medicine from your doctor before you arrive. Bring the medicine to the exam. Do not take it at home. Arrive one hour early. Bring someone who can take you home after the test. Your medicine will make you sleepy. After the exam, you may not drive, take a bus or take a taxi by yourself. No eating 8 hours before your exam. You may have clear liquids up until 4 hours before your exam. (Clear liquids include water, clear tea, black coffee and fruit juice without pulp.)  IF YOU WILL RECEIVE ANESTHESIA (be asleep for your exam): Arrive 1 1/2 hours early. Bring someone who can take you home after the test. You may not drive, take a bus or take a taxi by yourself. No eating 8 hours before your exam. You may have clear liquids up until 4 hours before your exam. (Clear liquids include water, clear tea, black coffee and fruit juice without pulp.)            Nov 04, 2019 11:15 AM CST   (Arrive by 11:00 AM)   Return Visit with Narinder Sauer MD   [facility] Neurosurgery ([facility])    [address] [place] [id number] [phone]            Nov 06, 2019  2:30 PM CST   (Arrive by 2:15 PM)   Return Visit with Raji Perez MD   [facility] Neurology ([facility])    [address] [place] [id number] [phone]              Who to contact     Please call your clinic at [phone] to:    Ask questions about your health    Make or cancel appointments    Discuss your medicines    Learn about your test results    Speak to your doctor            Additional Information About Your Visit        [employer] Information     [employer] gives you secure access to your electronic health record. If you see a primary care provider, you can also send messages to your care team and make appointments. If  you have questions, please call your primary care clinic.  If you do not have a primary care provider, please call [phone] and they will assist you.      [employer] is an electronic gateway that provides easy, online access to your medical records. With [employer], you can request a clinic appointment, read your test results, renew a prescription or communicate with your care team.     To access your existing account, please contact your [facility] or call [phone] for assistance.        Care EveryWhere ID     This is your Care EveryWhere ID. This could be used by other organizations to access your [facility] medical records  [id number]        Your Vitals Were     Pulse Height Pulse Oximetry BMI (Body Mass Index)          64 1.524 m (5') 97% 24.61 kg/m2         Blood Pressure from Last 3 Encounters:   11/07/18 131/74   11/05/18 125/78   04/25/18 149/84    Weight from Last 3 Encounters:   11/07/18 57.2 kg (126 lb)   11/05/18 57.2 kg (126 lb)   04/25/18 67.8 kg (149 lb 8 oz)              We Performed the Following     Lamotrigine Level          Where to get your medicines      These medications were sent to [facility] - [place], [place] - [address] AT [facility] [address]  [address], [place] [address]     Phone:  [phone]     lamoTRIgine 100 MG tablet          Primary Care Provider Office Phone # Fax #    Lamont Kapadia [phone]       [facility] [address] [facility] [address] [place] [address]        Equal Access to Services     DAVID MARY : Hadii aad ku hadasho Soomaali, waaxda luqadaha, qaybta kaalmada tank, delio nicholas. So [facility] [phone].    ATENCIÓN: Si habla español, tiene a becerril disposición servicios gratuitos de asistencia lingüística. Llame al [phone].    We comply with applicable federal civil rights laws and [place] laws. We do not discriminate on the basis of race,  color, national origin, age, disability, sex, sexual orientation, or gender identity.            Thank you!     Thank you for choosing [facility] NEUROLOGY  for your care. Our goal is always to provide you with excellent care. Hearing back from our patients is one way we can continue to improve our services. Please take a few minutes to complete the written survey that you may receive in the mail after your visit with us. Thank you!             Your Updated Medication List - Protect others around you: Learn how to safely use, store and throw away your medicines at www.disposemymeds.org.          This list is accurate as of 11/7/18 10:03 AM.  Always use your most recent med list.                   Brand Name Dispense Instructions for use Diagnosis    AMLODIPINE BESYLATE PO      Take 5 mg by mouth daily    Benign neoplasm of cerebral meninges (H), Partial epilepsy with impairment of consciousness, intractable (H)       CALCIUM 500 + D PO      Take 1 tablet by mouth daily.        fluticasone 50 MCG/ACT spray    FLONASE     USE 1 SPRAY IN EACH NOSTRIL EVERY DAY; SHAKE BEFORE USE    Meningioma (H)       lamoTRIgine 100 MG tablet    LaMICtal    540 tablet    Take one and one half tablets twice per day (total 300 mg per day)    Localization-related symptomatic epilepsy and epileptic syndromes with complex partial seizures, not intractable, without status epilepticus (H)       levothyroxine 75 MCG tablet    SYNTHROID/LEVOTHROID     Take 1 tablet by mouth daily.        meclizine 25 MG Chew      Take 25 mg by mouth every 6 hours as needed for dizziness    Benign neoplasm of cerebral meninges (H), Partial epilepsy with impairment of consciousness, intractable (H)       simvastatin 20 MG tablet    ZOCOR     Take 1 tablet by mouth daily.        TYLENOL ARTHRITIS PAIN 650 MG CR tablet   Generic drug:  acetaminophen      Take 650 mg by mouth as needed.

## 2018-11-08 LAB — LAMOTRIGINE SERPL-MCNC: 7.3 UG/ML (ref 2.5–15)

## 2018-11-09 NOTE — PROGRESS NOTES
Service Date: 2018      Lamont Kapadia Jr, MD   [facility]    [address]   [place] [address]      RE: Suzan Ballard   MRN: [id number]   : 1955      Dear Lamnot:      This is in regard to followup on Suzan Ballard.  The patient returned today on 2018.  Her chief complaint is that of recurrent meningiomas as well as seizures.      The patient said that she has been stable since I last saw her.  She did have follow up with Dr. Sauer, our [facility] neurosurgeon.  Unfortunately, her meningioma is slightly larger.  There has been a slight increase in the posterior superior sagittal sinus meningioma since the last study of 2017.  She continues to have a stable high tiny right frontal parafalcine meningioma.  I reassured her about these findings and she is going to continue to have conservative followup.  She is aware that she may be a candidate for Gamma Knife treatment.  She will have a followup MRI scan next fall and see Dr. Sauer again.  She did have a slight increase in size of central signal characteristic of a small left parietal cavernous malformation and the radiologist suggested there could have been an interval hemorrhage.  She is not aware of any change in her neurologic condition and she feels that she is stable.  Dr. Sauer reviewed all these findings with her on the visit of 2018.  The patient does not drive much, but if she does, she avoids the freeway.  She did tell me that she has been faithful about taking her anticonvulsants and she has not had any recent seizures.  She had seen Dr. Ledesma in the past, our [facility] epileptologist.  She would prefer to continue to be seen in the General Neurology Clinic.  She did have tonic-clonic seizures with amnesia when she had had previous issues and treatment through [facility].  The patient has not had any of those spells since that hospitalization and she has not had any simple partial seizures  involving the right arm or face.  She has done well on her Lamictal and has not had any trouble taking the medicine.  The patient did discuss with me her current stress and I believe you are aware of it.  She was able to ventilate about her marriage and she is having continued psychologic therapy.  She is undecided at this point whether she will leave her .  She did note that her 2 children and family members are aware of the situation and are extremely supportive to her.  She denied any suicidal issues.  The patient did note at times it has been hard for her to sleep because of her stress, but also she notes that it is hard for her to sleep against her craniotomy site because of discomfort.  She did deny any persistent headache though.  She has not had any stroke-like symptoms including focal weakness or paresthesias nor any imbalance, and she has not had any visual disturbance nor any significant problems with speech.  She has had psychometric evaluations in the past and I have invited her to have formal followup studies, but she said she is doing well and has had really no trouble with day to day activities including running her household.  The patient did recall when she had heard about initial issues that had taken place with her  in the [facility] that she did have a generalized chill and had some shaking, but did not lose consciousness.  She did tell me this was probably a stress-effect after she had noted his issues that were recorded on his cell phone.  She did discuss with me her family living here and other members in the [facility].  She has not visited her father for 2 years.  Her mother is still fine.  Her father does have continued care because of his Alzheimer disease and prostate cancer.  The patient did tell me that she has not been using her CPAP because her face mask is broken and she is going to have a new one now.        CURRENT MEDICATIONS:   She continues on:   1.  Amlodipine.      2.  Calcium.     3.  Vitamin D.     4.  Flonase.     5.  Lamictal 150 mg b.i.d.   6.  Levothyroxine.     7.  Simvastatin.   8.  Tylenol Arthritis.        She is due for followup Lamictal level.  Her last was done on 01/30/2018 and was therapeutic at 6.2.  On 09/03/2018, she had normal metabolic profile.  On 06/04/2018, she had normal liver function tests except for a bilirubin of 1.7.  Her blood count showed hemoglobin o 14 grams and a white count of 3700.  She had normal thyroid indices on that date.      Neurologic examination revealed a pleasant woman.  Her blood pressure is 131/74 with a pulse of 64.  She had normal gait, and she was able to do tandem.  Muscle stretch reflexes were present and symmetric except for absent ankle jerks.  Her toes are downgoing to plantar stimulation.  Strength testing was all normal.  She had no dysmetria.  The patient's cranial nerve examination was unremarkable and despite her resected meningioma and cavernous hemangioma findings on MRI, she had no obvious visual field deficit.  Her Mini-Cog showed that she was oriented x3 to time, place and person.  She could initially recall 1/3 objects after 5 minutes and then with prompting 3/3 objects.  She could tell me about recent world affairs.  The patient had trouble telling me the states around [place] and said [place] and [place], but then said [place] and [place] and then laughed and said she just was not aware of the geography here.  She could do calculations well.  She has no finger recognition issues or left/right orientation problems.  The patient was able to draw a clock correctly to indicate 4:30.      The patient did tell me that if she does have a headache about once per month she takes Tylenol or ibuprofen with good relief.      IMPRESSION:   1.  Prior resected meningiomas as well as residual ones and increase in interval size of a sagittal sinus meningioma.   2.  Known cavernous hemangioma.   3.  Prior  partial and generalized seizures, which seem to be well controlled.      The patient does want to continue to have neurologic followup here through the [facility] General Neurology program.  I have asked that she be seen again in a year and on a p.r.n. basis.      Thank you for allowing me to see this patient.       Sincerely yours,       Tanisha Perez MD      I spent 30 minutes with the patient today.  Over 50% of the time this involved counseling and coordination of care.         TANISHA PEREZ MD             D: 2018   T: 2018   MT: LUIS      Name:     FLORESITA ROY   MRN:      [id number]        Account:      [id number]   :      1955           Service Date: 2018      Document: [id number]

## 2018-11-12 ENCOUNTER — CARE COORDINATION (OUTPATIENT)
Dept: NEUROLOGY | Facility: CLINIC | Age: 63
End: 2018-11-12

## 2018-11-12 NOTE — PROGRESS NOTES
Raji Perez MD McAllister, Angela, YARIEL                   Tell her lamictal level fine              Results sent via my chart message per Dr. Perez

## 2018-11-13 ENCOUNTER — OFFICE VISIT - HEALTHEAST (OUTPATIENT)
Dept: BEHAVIORAL HEALTH | Facility: CLINIC | Age: 63
End: 2018-11-13

## 2018-11-13 DIAGNOSIS — F43.23 ADJUSTMENT DISORDER WITH MIXED ANXIETY AND DEPRESSED MOOD: ICD-10-CM

## 2018-11-20 ENCOUNTER — COMMUNICATION - HEALTHEAST (OUTPATIENT)
Dept: INTERNAL MEDICINE | Facility: CLINIC | Age: 63
End: 2018-11-20

## 2018-11-20 DIAGNOSIS — E78.5 HYPERLIPIDEMIA: ICD-10-CM

## 2018-12-06 ENCOUNTER — AMBULATORY - HEALTHEAST (OUTPATIENT)
Dept: SLEEP MEDICINE | Facility: CLINIC | Age: 63
End: 2018-12-06

## 2018-12-06 ENCOUNTER — OFFICE VISIT - HEALTHEAST (OUTPATIENT)
Dept: SLEEP MEDICINE | Facility: CLINIC | Age: 63
End: 2018-12-06

## 2018-12-06 DIAGNOSIS — G47.33 OSA ON CPAP: ICD-10-CM

## 2018-12-06 DIAGNOSIS — G47.10 HYPERSOMNIA: ICD-10-CM

## 2018-12-06 DIAGNOSIS — G47.8 SLEEP DYSFUNCTION WITH SLEEP STAGE DISTURBANCE: ICD-10-CM

## 2018-12-06 ASSESSMENT — MIFFLIN-ST. JEOR: SCORE: 1032.57

## 2018-12-12 ENCOUNTER — OFFICE VISIT - HEALTHEAST (OUTPATIENT)
Dept: INTERNAL MEDICINE | Facility: CLINIC | Age: 63
End: 2018-12-12

## 2018-12-12 ENCOUNTER — COMMUNICATION - HEALTHEAST (OUTPATIENT)
Dept: INTERNAL MEDICINE | Facility: CLINIC | Age: 63
End: 2018-12-12

## 2018-12-12 DIAGNOSIS — D32.0 BENIGN NEOPLASM OF CEREBRAL MENINGES (H): ICD-10-CM

## 2018-12-12 DIAGNOSIS — E78.5 HYPERLIPIDEMIA: ICD-10-CM

## 2018-12-12 DIAGNOSIS — I10 ESSENTIAL HYPERTENSION: ICD-10-CM

## 2018-12-12 DIAGNOSIS — G40.909 SEIZURE DISORDER (H): ICD-10-CM

## 2018-12-12 DIAGNOSIS — M17.11 OSTEOARTHRITIS OF RIGHT KNEE: ICD-10-CM

## 2018-12-12 DIAGNOSIS — J30.9 ALLERGIC RHINITIS: ICD-10-CM

## 2018-12-12 DIAGNOSIS — Z23 NEED FOR PNEUMOCOCCAL VACCINE: ICD-10-CM

## 2018-12-12 DIAGNOSIS — F43.22 ADJUSTMENT DISORDER WITH ANXIOUS MOOD: ICD-10-CM

## 2018-12-12 LAB
ALBUMIN SERPL-MCNC: 4.1 G/DL (ref 3.5–5)
ALP SERPL-CCNC: 98 U/L (ref 45–120)
ALT SERPL W P-5'-P-CCNC: 20 U/L (ref 0–45)
ANION GAP SERPL CALCULATED.3IONS-SCNC: 10 MMOL/L (ref 5–18)
AST SERPL W P-5'-P-CCNC: 18 U/L (ref 0–40)
BILIRUB DIRECT SERPL-MCNC: 0.7 MG/DL
BILIRUB SERPL-MCNC: 2.7 MG/DL (ref 0–1)
BUN SERPL-MCNC: 12 MG/DL (ref 8–22)
CALCIUM SERPL-MCNC: 10 MG/DL (ref 8.5–10.5)
CHLORIDE BLD-SCNC: 105 MMOL/L (ref 98–107)
CHOLEST SERPL-MCNC: 171 MG/DL
CO2 SERPL-SCNC: 28 MMOL/L (ref 22–31)
CREAT SERPL-MCNC: 0.64 MG/DL (ref 0.6–1.1)
ERYTHROCYTE [DISTWIDTH] IN BLOOD BY AUTOMATED COUNT: 12.2 % (ref 11–14.5)
FASTING STATUS PATIENT QL REPORTED: YES
GFR SERPL CREATININE-BSD FRML MDRD: >60 ML/MIN/1.73M2
GLUCOSE BLD-MCNC: 104 MG/DL (ref 70–125)
HCT VFR BLD AUTO: 44.6 % (ref 35–47)
HDLC SERPL-MCNC: 69 MG/DL
HGB BLD-MCNC: 14.7 G/DL (ref 12–16)
LDLC SERPL CALC-MCNC: 81 MG/DL
MCH RBC QN AUTO: 27.2 PG (ref 27–34)
MCHC RBC AUTO-ENTMCNC: 33 G/DL (ref 32–36)
MCV RBC AUTO: 82 FL (ref 80–100)
PLATELET # BLD AUTO: 267 THOU/UL (ref 140–440)
PMV BLD AUTO: 6.7 FL (ref 7–10)
POTASSIUM BLD-SCNC: 3.4 MMOL/L (ref 3.5–5)
PROT SERPL-MCNC: 7.7 G/DL (ref 6–8)
RBC # BLD AUTO: 5.42 MILL/UL (ref 3.8–5.4)
SODIUM SERPL-SCNC: 143 MMOL/L (ref 136–145)
TRIGL SERPL-MCNC: 106 MG/DL
WBC: 4.6 THOU/UL (ref 4–11)

## 2018-12-12 ASSESSMENT — MIFFLIN-ST. JEOR: SCORE: 1028.03

## 2018-12-13 ENCOUNTER — COMMUNICATION - HEALTHEAST (OUTPATIENT)
Dept: INTERNAL MEDICINE | Facility: CLINIC | Age: 63
End: 2018-12-13

## 2018-12-18 ENCOUNTER — OFFICE VISIT - HEALTHEAST (OUTPATIENT)
Dept: BEHAVIORAL HEALTH | Facility: CLINIC | Age: 63
End: 2018-12-18

## 2018-12-18 DIAGNOSIS — F43.23 ADJUSTMENT DISORDER WITH MIXED ANXIETY AND DEPRESSED MOOD: ICD-10-CM

## 2018-12-19 ENCOUNTER — COMMUNICATION - HEALTHEAST (OUTPATIENT)
Dept: ADMINISTRATIVE | Facility: CLINIC | Age: 63
End: 2018-12-19

## 2019-01-03 ENCOUNTER — OFFICE VISIT - HEALTHEAST (OUTPATIENT)
Dept: BEHAVIORAL HEALTH | Facility: CLINIC | Age: 64
End: 2019-01-03

## 2019-01-03 DIAGNOSIS — F43.23 ADJUSTMENT DISORDER WITH MIXED ANXIETY AND DEPRESSED MOOD: ICD-10-CM

## 2019-01-08 ENCOUNTER — OFFICE VISIT - HEALTHEAST (OUTPATIENT)
Dept: INTERNAL MEDICINE | Facility: CLINIC | Age: 64
End: 2019-01-08

## 2019-01-08 DIAGNOSIS — E03.9 ACQUIRED HYPOTHYROIDISM: ICD-10-CM

## 2019-01-08 DIAGNOSIS — Z71.9 ENCOUNTER FOR COUNSELING: ICD-10-CM

## 2019-01-08 DIAGNOSIS — E78.2 MIXED HYPERLIPIDEMIA: ICD-10-CM

## 2019-01-08 DIAGNOSIS — I10 ESSENTIAL HYPERTENSION: ICD-10-CM

## 2019-01-08 ASSESSMENT — MIFFLIN-ST. JEOR: SCORE: 1028.03

## 2019-01-15 ENCOUNTER — OFFICE VISIT - HEALTHEAST (OUTPATIENT)
Dept: BEHAVIORAL HEALTH | Facility: CLINIC | Age: 64
End: 2019-01-15

## 2019-01-15 DIAGNOSIS — F43.23 ADJUSTMENT DISORDER WITH MIXED ANXIETY AND DEPRESSED MOOD: ICD-10-CM

## 2019-01-23 ENCOUNTER — COMMUNICATION - HEALTHEAST (OUTPATIENT)
Dept: INTERNAL MEDICINE | Facility: CLINIC | Age: 64
End: 2019-01-23

## 2019-01-23 DIAGNOSIS — G40.909 SEIZURE DISORDER (H): ICD-10-CM

## 2019-01-24 ENCOUNTER — COMMUNICATION - HEALTHEAST (OUTPATIENT)
Dept: INTERNAL MEDICINE | Facility: CLINIC | Age: 64
End: 2019-01-24

## 2019-01-24 DIAGNOSIS — F43.22 ADJUSTMENT DISORDER WITH ANXIOUS MOOD: ICD-10-CM

## 2019-01-29 ENCOUNTER — OFFICE VISIT - HEALTHEAST (OUTPATIENT)
Dept: BEHAVIORAL HEALTH | Facility: CLINIC | Age: 64
End: 2019-01-29

## 2019-01-29 DIAGNOSIS — F43.23 ADJUSTMENT DISORDER WITH MIXED ANXIETY AND DEPRESSED MOOD: ICD-10-CM

## 2019-02-04 ENCOUNTER — OFFICE VISIT - HEALTHEAST (OUTPATIENT)
Dept: SLEEP MEDICINE | Facility: CLINIC | Age: 64
End: 2019-02-04

## 2019-02-04 DIAGNOSIS — G47.8 SLEEP DYSFUNCTION WITH SLEEP STAGE DISTURBANCE: ICD-10-CM

## 2019-02-04 DIAGNOSIS — G47.33 OBSTRUCTIVE SLEEP APNEA: ICD-10-CM

## 2019-02-04 ASSESSMENT — MIFFLIN-ST. JEOR: SCORE: 1026.22

## 2019-03-11 ENCOUNTER — COMMUNICATION - HEALTHEAST (OUTPATIENT)
Dept: INTERNAL MEDICINE | Facility: CLINIC | Age: 64
End: 2019-03-11

## 2019-03-11 DIAGNOSIS — E03.9 HYPOTHYROIDISM: ICD-10-CM

## 2019-03-13 ENCOUNTER — OFFICE VISIT - HEALTHEAST (OUTPATIENT)
Dept: SLEEP MEDICINE | Facility: CLINIC | Age: 64
End: 2019-03-13

## 2019-03-13 DIAGNOSIS — G47.10 HYPERSOMNIA: ICD-10-CM

## 2019-03-13 DIAGNOSIS — G47.8 SLEEP DYSFUNCTION WITH SLEEP STAGE DISTURBANCE: ICD-10-CM

## 2019-03-13 DIAGNOSIS — G47.33 OBSTRUCTIVE SLEEP APNEA: ICD-10-CM

## 2019-03-13 ASSESSMENT — MIFFLIN-ST. JEOR: SCORE: 1038.92

## 2019-03-14 ENCOUNTER — COMMUNICATION - HEALTHEAST (OUTPATIENT)
Dept: INTERNAL MEDICINE | Facility: CLINIC | Age: 64
End: 2019-03-14

## 2019-03-14 ENCOUNTER — OFFICE VISIT - HEALTHEAST (OUTPATIENT)
Dept: INTERNAL MEDICINE | Facility: CLINIC | Age: 64
End: 2019-03-14

## 2019-03-14 DIAGNOSIS — E03.9 ACQUIRED HYPOTHYROIDISM: ICD-10-CM

## 2019-03-14 DIAGNOSIS — G40.909 SEIZURE DISORDER (H): ICD-10-CM

## 2019-03-14 DIAGNOSIS — M89.9 DISORDER OF BONE AND CARTILAGE: ICD-10-CM

## 2019-03-14 DIAGNOSIS — I10 ESSENTIAL HYPERTENSION: ICD-10-CM

## 2019-03-14 DIAGNOSIS — J30.9 ALLERGIC RHINITIS, UNSPECIFIED SEASONALITY, UNSPECIFIED TRIGGER: ICD-10-CM

## 2019-03-14 DIAGNOSIS — M15.0 PRIMARY OSTEOARTHRITIS INVOLVING MULTIPLE JOINTS: ICD-10-CM

## 2019-03-14 DIAGNOSIS — E78.2 MIXED HYPERLIPIDEMIA: ICD-10-CM

## 2019-03-14 DIAGNOSIS — M94.9 DISORDER OF BONE AND CARTILAGE: ICD-10-CM

## 2019-03-14 LAB
ALBUMIN SERPL-MCNC: 3.8 G/DL (ref 3.5–5)
ALP SERPL-CCNC: 83 U/L (ref 45–120)
ALT SERPL W P-5'-P-CCNC: 14 U/L (ref 0–45)
ANION GAP SERPL CALCULATED.3IONS-SCNC: 9 MMOL/L (ref 5–18)
AST SERPL W P-5'-P-CCNC: 13 U/L (ref 0–40)
BILIRUB DIRECT SERPL-MCNC: 0.6 MG/DL
BILIRUB SERPL-MCNC: 1.9 MG/DL (ref 0–1)
BUN SERPL-MCNC: 14 MG/DL (ref 8–22)
CALCIUM SERPL-MCNC: 9.5 MG/DL (ref 8.5–10.5)
CHLORIDE BLD-SCNC: 107 MMOL/L (ref 98–107)
CHOLEST SERPL-MCNC: 161 MG/DL
CO2 SERPL-SCNC: 27 MMOL/L (ref 22–31)
CREAT SERPL-MCNC: 0.64 MG/DL (ref 0.6–1.1)
ERYTHROCYTE [DISTWIDTH] IN BLOOD BY AUTOMATED COUNT: 12.9 % (ref 11–14.5)
FASTING STATUS PATIENT QL REPORTED: YES
GFR SERPL CREATININE-BSD FRML MDRD: >60 ML/MIN/1.73M2
GLUCOSE BLD-MCNC: 82 MG/DL (ref 70–125)
HCT VFR BLD AUTO: 39.8 % (ref 35–47)
HDLC SERPL-MCNC: 62 MG/DL
HGB BLD-MCNC: 13.8 G/DL (ref 12–16)
LDLC SERPL CALC-MCNC: 81 MG/DL
MCH RBC QN AUTO: 27.5 PG (ref 27–34)
MCHC RBC AUTO-ENTMCNC: 34.6 G/DL (ref 32–36)
MCV RBC AUTO: 79 FL (ref 80–100)
PLATELET # BLD AUTO: 244 THOU/UL (ref 140–440)
PMV BLD AUTO: 6.7 FL (ref 7–10)
POTASSIUM BLD-SCNC: 3.7 MMOL/L (ref 3.5–5)
PROT SERPL-MCNC: 7.1 G/DL (ref 6–8)
RBC # BLD AUTO: 5.01 MILL/UL (ref 3.8–5.4)
SODIUM SERPL-SCNC: 143 MMOL/L (ref 136–145)
T4 FREE SERPL-MCNC: 1.2 NG/DL (ref 0.7–1.8)
TRIGL SERPL-MCNC: 91 MG/DL
TSH SERPL DL<=0.005 MIU/L-ACNC: 0.48 UIU/ML (ref 0.3–5)
WBC: 4.1 THOU/UL (ref 4–11)

## 2019-03-14 ASSESSMENT — MIFFLIN-ST. JEOR: SCORE: 1023.5

## 2019-03-15 ENCOUNTER — COMMUNICATION - HEALTHEAST (OUTPATIENT)
Dept: INTERNAL MEDICINE | Facility: CLINIC | Age: 64
End: 2019-03-15

## 2019-03-15 LAB — 25(OH)D3 SERPL-MCNC: 33.8 NG/ML (ref 30–80)

## 2019-03-16 ENCOUNTER — COMMUNICATION - HEALTHEAST (OUTPATIENT)
Dept: INTERNAL MEDICINE | Facility: CLINIC | Age: 64
End: 2019-03-16

## 2019-03-16 DIAGNOSIS — J30.9 ALLERGIC RHINITIS, UNSPECIFIED SEASONALITY, UNSPECIFIED TRIGGER: ICD-10-CM

## 2019-07-22 ENCOUNTER — OFFICE VISIT - HEALTHEAST (OUTPATIENT)
Dept: INTERNAL MEDICINE | Facility: CLINIC | Age: 64
End: 2019-07-22

## 2019-07-22 DIAGNOSIS — I10 ESSENTIAL HYPERTENSION: ICD-10-CM

## 2019-07-22 DIAGNOSIS — M79.644 THUMB PAIN, RIGHT: ICD-10-CM

## 2019-07-22 DIAGNOSIS — E78.2 MIXED HYPERLIPIDEMIA: ICD-10-CM

## 2019-07-22 LAB
ERYTHROCYTE [SEDIMENTATION RATE] IN BLOOD BY WESTERGREN METHOD: 9 MM/HR (ref 0–20)
URATE SERPL-MCNC: 4.9 MG/DL (ref 2–7.5)

## 2019-07-22 ASSESSMENT — MIFFLIN-ST. JEOR: SCORE: 1041.64

## 2019-07-23 ENCOUNTER — COMMUNICATION - HEALTHEAST (OUTPATIENT)
Dept: INTERNAL MEDICINE | Facility: CLINIC | Age: 64
End: 2019-07-23

## 2019-09-04 ENCOUNTER — OFFICE VISIT - HEALTHEAST (OUTPATIENT)
Dept: INTERNAL MEDICINE | Facility: CLINIC | Age: 64
End: 2019-09-04

## 2019-09-04 DIAGNOSIS — E03.9 ACQUIRED HYPOTHYROIDISM: ICD-10-CM

## 2019-09-04 DIAGNOSIS — F43.22 ADJUSTMENT DISORDER WITH ANXIOUS MOOD: ICD-10-CM

## 2019-09-04 DIAGNOSIS — M15.0 PRIMARY OSTEOARTHRITIS INVOLVING MULTIPLE JOINTS: ICD-10-CM

## 2019-09-04 DIAGNOSIS — I10 ESSENTIAL HYPERTENSION: ICD-10-CM

## 2019-09-04 DIAGNOSIS — Z12.11 SCREEN FOR COLON CANCER: ICD-10-CM

## 2019-09-04 DIAGNOSIS — E78.2 MIXED HYPERLIPIDEMIA: ICD-10-CM

## 2019-09-04 DIAGNOSIS — J30.9 ALLERGIC RHINITIS, UNSPECIFIED SEASONALITY, UNSPECIFIED TRIGGER: ICD-10-CM

## 2019-09-04 DIAGNOSIS — G40.909 SEIZURE DISORDER (H): ICD-10-CM

## 2019-09-04 ASSESSMENT — MIFFLIN-ST. JEOR: SCORE: 1041.64

## 2019-09-18 ENCOUNTER — OFFICE VISIT - HEALTHEAST (OUTPATIENT)
Dept: INTERNAL MEDICINE | Facility: CLINIC | Age: 64
End: 2019-09-18

## 2019-09-18 ENCOUNTER — COMMUNICATION - HEALTHEAST (OUTPATIENT)
Dept: INTERNAL MEDICINE | Facility: CLINIC | Age: 64
End: 2019-09-18

## 2019-09-18 DIAGNOSIS — E78.2 MIXED HYPERLIPIDEMIA: ICD-10-CM

## 2019-09-18 DIAGNOSIS — M15.0 PRIMARY OSTEOARTHRITIS INVOLVING MULTIPLE JOINTS: ICD-10-CM

## 2019-09-18 DIAGNOSIS — G40.909 SEIZURE DISORDER (H): ICD-10-CM

## 2019-09-18 DIAGNOSIS — I10 ESSENTIAL HYPERTENSION: ICD-10-CM

## 2019-09-18 DIAGNOSIS — M89.9 DISORDER OF BONE AND CARTILAGE: ICD-10-CM

## 2019-09-18 DIAGNOSIS — E03.9 ACQUIRED HYPOTHYROIDISM: ICD-10-CM

## 2019-09-18 DIAGNOSIS — M94.9 DISORDER OF BONE AND CARTILAGE: ICD-10-CM

## 2019-09-18 DIAGNOSIS — Z23 NEED FOR IMMUNIZATION AGAINST INFLUENZA: ICD-10-CM

## 2019-09-18 LAB
ALBUMIN SERPL-MCNC: 4 G/DL (ref 3.5–5)
ALP SERPL-CCNC: 98 U/L (ref 45–120)
ALT SERPL W P-5'-P-CCNC: 15 U/L (ref 0–45)
ANION GAP SERPL CALCULATED.3IONS-SCNC: 12 MMOL/L (ref 5–18)
AST SERPL W P-5'-P-CCNC: 17 U/L (ref 0–40)
BILIRUB DIRECT SERPL-MCNC: 0.6 MG/DL
BILIRUB SERPL-MCNC: 2 MG/DL (ref 0–1)
BUN SERPL-MCNC: 14 MG/DL (ref 8–22)
CALCIUM SERPL-MCNC: 9.7 MG/DL (ref 8.5–10.5)
CHLORIDE BLD-SCNC: 106 MMOL/L (ref 98–107)
CHOLEST SERPL-MCNC: 187 MG/DL
CO2 SERPL-SCNC: 25 MMOL/L (ref 22–31)
CREAT SERPL-MCNC: 0.71 MG/DL (ref 0.6–1.1)
ERYTHROCYTE [DISTWIDTH] IN BLOOD BY AUTOMATED COUNT: 12.4 % (ref 11–14.5)
FASTING STATUS PATIENT QL REPORTED: YES
GFR SERPL CREATININE-BSD FRML MDRD: >60 ML/MIN/1.73M2
GLUCOSE BLD-MCNC: 93 MG/DL (ref 70–125)
HCT VFR BLD AUTO: 44.7 % (ref 35–47)
HDLC SERPL-MCNC: 68 MG/DL
HGB BLD-MCNC: 14.5 G/DL (ref 12–16)
LDLC SERPL CALC-MCNC: 101 MG/DL
MCH RBC QN AUTO: 27 PG (ref 27–34)
MCHC RBC AUTO-ENTMCNC: 32.6 G/DL (ref 32–36)
MCV RBC AUTO: 83 FL (ref 80–100)
PLATELET # BLD AUTO: 263 THOU/UL (ref 140–440)
PMV BLD AUTO: 6.9 FL (ref 7–10)
POTASSIUM BLD-SCNC: 3.7 MMOL/L (ref 3.5–5)
PROT SERPL-MCNC: 7.3 G/DL (ref 6–8)
RBC # BLD AUTO: 5.38 MILL/UL (ref 3.8–5.4)
SODIUM SERPL-SCNC: 143 MMOL/L (ref 136–145)
TRIGL SERPL-MCNC: 90 MG/DL
WBC: 3.7 THOU/UL (ref 4–11)

## 2019-09-18 ASSESSMENT — MIFFLIN-ST. JEOR: SCORE: 1028.03

## 2019-09-19 LAB — 25(OH)D3 SERPL-MCNC: 37.6 NG/ML (ref 30–80)

## 2019-12-27 ENCOUNTER — COMMUNICATION - HEALTHEAST (OUTPATIENT)
Dept: INTERNAL MEDICINE | Facility: CLINIC | Age: 64
End: 2019-12-27

## 2019-12-27 DIAGNOSIS — E78.2 MIXED HYPERLIPIDEMIA: ICD-10-CM

## 2020-03-02 ENCOUNTER — HEALTH MAINTENANCE LETTER (OUTPATIENT)
Age: 65
End: 2020-03-02

## 2020-04-01 ENCOUNTER — COMMUNICATION - HEALTHEAST (OUTPATIENT)
Dept: INTERNAL MEDICINE | Facility: CLINIC | Age: 65
End: 2020-04-01

## 2020-04-01 DIAGNOSIS — G40.909 SEIZURE DISORDER (H): ICD-10-CM

## 2020-07-30 ENCOUNTER — COMMUNICATION - HEALTHEAST (OUTPATIENT)
Dept: SCHEDULING | Facility: CLINIC | Age: 65
End: 2020-07-30

## 2020-08-19 ENCOUNTER — COMMUNICATION - HEALTHEAST (OUTPATIENT)
Dept: LAB | Facility: CLINIC | Age: 65
End: 2020-08-19

## 2020-08-19 ENCOUNTER — OFFICE VISIT - HEALTHEAST (OUTPATIENT)
Dept: INTERNAL MEDICINE | Facility: CLINIC | Age: 65
End: 2020-08-19

## 2020-08-19 DIAGNOSIS — Z12.11 SCREEN FOR COLON CANCER: ICD-10-CM

## 2020-08-19 DIAGNOSIS — G40.909 SEIZURE DISORDER (H): ICD-10-CM

## 2020-08-19 DIAGNOSIS — E78.2 MIXED HYPERLIPIDEMIA: ICD-10-CM

## 2020-08-19 DIAGNOSIS — F33.1 MODERATE EPISODE OF RECURRENT MAJOR DEPRESSIVE DISORDER (H): ICD-10-CM

## 2020-08-19 DIAGNOSIS — Z12.31 VISIT FOR SCREENING MAMMOGRAM: ICD-10-CM

## 2020-08-19 DIAGNOSIS — E03.9 ACQUIRED HYPOTHYROIDISM: ICD-10-CM

## 2020-08-19 DIAGNOSIS — Z00.00 ROUTINE GENERAL MEDICAL EXAMINATION AT A HEALTH CARE FACILITY: ICD-10-CM

## 2020-08-19 DIAGNOSIS — I10 ESSENTIAL HYPERTENSION: ICD-10-CM

## 2020-08-19 ASSESSMENT — MIFFLIN-ST. JEOR: SCORE: 1028.03

## 2020-08-20 ENCOUNTER — AMBULATORY - HEALTHEAST (OUTPATIENT)
Dept: LAB | Facility: CLINIC | Age: 65
End: 2020-08-20

## 2020-08-20 DIAGNOSIS — E03.9 ACQUIRED HYPOTHYROIDISM: ICD-10-CM

## 2020-08-20 DIAGNOSIS — I10 ESSENTIAL HYPERTENSION: ICD-10-CM

## 2020-08-20 DIAGNOSIS — E78.2 MIXED HYPERLIPIDEMIA: ICD-10-CM

## 2020-08-20 LAB
ALBUMIN SERPL-MCNC: 3.8 G/DL (ref 3.5–5)
ALP SERPL-CCNC: 92 U/L (ref 45–120)
ALT SERPL W P-5'-P-CCNC: 36 U/L (ref 0–45)
ANION GAP SERPL CALCULATED.3IONS-SCNC: 9 MMOL/L (ref 5–18)
AST SERPL W P-5'-P-CCNC: 25 U/L (ref 0–40)
BILIRUB DIRECT SERPL-MCNC: 0.5 MG/DL
BILIRUB SERPL-MCNC: 1.6 MG/DL (ref 0–1)
BUN SERPL-MCNC: 17 MG/DL (ref 8–22)
CALCIUM SERPL-MCNC: 9.6 MG/DL (ref 8.5–10.5)
CHLORIDE BLD-SCNC: 107 MMOL/L (ref 98–107)
CHOLEST SERPL-MCNC: 154 MG/DL
CO2 SERPL-SCNC: 26 MMOL/L (ref 22–31)
CREAT SERPL-MCNC: 0.59 MG/DL (ref 0.6–1.1)
ERYTHROCYTE [DISTWIDTH] IN BLOOD BY AUTOMATED COUNT: 11.5 % (ref 11–14.5)
FASTING STATUS PATIENT QL REPORTED: YES
GFR SERPL CREATININE-BSD FRML MDRD: >60 ML/MIN/1.73M2
GLUCOSE BLD-MCNC: 89 MG/DL (ref 70–125)
HCT VFR BLD AUTO: 39.9 % (ref 35–47)
HDLC SERPL-MCNC: 56 MG/DL
HGB BLD-MCNC: 13.2 G/DL (ref 12–16)
LDLC SERPL CALC-MCNC: 79 MG/DL
MCH RBC QN AUTO: 27.2 PG (ref 27–34)
MCHC RBC AUTO-ENTMCNC: 33 G/DL (ref 32–36)
MCV RBC AUTO: 82 FL (ref 80–100)
PLATELET # BLD AUTO: 315 THOU/UL (ref 140–440)
PMV BLD AUTO: 6.1 FL (ref 7–10)
POTASSIUM BLD-SCNC: 3.7 MMOL/L (ref 3.5–5)
PROT SERPL-MCNC: 7.3 G/DL (ref 6–8)
RBC # BLD AUTO: 4.84 MILL/UL (ref 3.8–5.4)
SODIUM SERPL-SCNC: 142 MMOL/L (ref 136–145)
T4 FREE SERPL-MCNC: 1.3 NG/DL (ref 0.7–1.8)
TRIGL SERPL-MCNC: 96 MG/DL
TSH SERPL DL<=0.005 MIU/L-ACNC: 0.01 UIU/ML (ref 0.3–5)
WBC: 3.8 THOU/UL (ref 4–11)

## 2020-08-20 ASSESSMENT — PATIENT HEALTH QUESTIONNAIRE - PHQ9: SUM OF ALL RESPONSES TO PHQ QUESTIONS 1-9: 10

## 2020-08-24 ENCOUNTER — COMMUNICATION - HEALTHEAST (OUTPATIENT)
Dept: INTERNAL MEDICINE | Facility: CLINIC | Age: 65
End: 2020-08-24

## 2020-08-24 DIAGNOSIS — E03.9 ACQUIRED HYPOTHYROIDISM: ICD-10-CM

## 2020-08-27 ENCOUNTER — COMMUNICATION - HEALTHEAST (OUTPATIENT)
Dept: INTERNAL MEDICINE | Facility: CLINIC | Age: 65
End: 2020-08-27

## 2020-09-21 ENCOUNTER — HOSPITAL ENCOUNTER (OUTPATIENT)
Dept: MAMMOGRAPHY | Facility: CLINIC | Age: 65
Discharge: HOME | End: 2020-09-21
Attending: INTERNAL MEDICINE

## 2020-09-21 DIAGNOSIS — Z12.31 VISIT FOR SCREENING MAMMOGRAM: ICD-10-CM

## 2020-09-29 ENCOUNTER — COMMUNICATION - HEALTHEAST (OUTPATIENT)
Dept: INTERNAL MEDICINE | Facility: CLINIC | Age: 65
End: 2020-09-29

## 2020-09-29 DIAGNOSIS — F43.22 ADJUSTMENT DISORDER WITH ANXIOUS MOOD: ICD-10-CM

## 2020-09-29 DIAGNOSIS — I10 ESSENTIAL HYPERTENSION: ICD-10-CM

## 2020-09-29 DIAGNOSIS — E78.2 MIXED HYPERLIPIDEMIA: ICD-10-CM

## 2020-09-29 DIAGNOSIS — J30.9 ALLERGIC RHINITIS, UNSPECIFIED SEASONALITY, UNSPECIFIED TRIGGER: ICD-10-CM

## 2020-09-29 DIAGNOSIS — M15.0 PRIMARY OSTEOARTHRITIS INVOLVING MULTIPLE JOINTS: ICD-10-CM

## 2020-09-29 DIAGNOSIS — G40.909 SEIZURE DISORDER (H): ICD-10-CM

## 2020-09-29 DIAGNOSIS — E03.9 ACQUIRED HYPOTHYROIDISM: ICD-10-CM

## 2020-11-03 ENCOUNTER — COMMUNICATION - HEALTHEAST (OUTPATIENT)
Dept: INTERNAL MEDICINE | Facility: CLINIC | Age: 65
End: 2020-11-03

## 2020-11-03 DIAGNOSIS — G40.909 SEIZURE DISORDER (H): ICD-10-CM

## 2020-11-05 ENCOUNTER — COMMUNICATION - HEALTHEAST (OUTPATIENT)
Dept: INTERNAL MEDICINE | Facility: CLINIC | Age: 65
End: 2020-11-05

## 2020-11-05 DIAGNOSIS — G40.909 SEIZURE DISORDER (H): ICD-10-CM

## 2020-11-18 ENCOUNTER — OFFICE VISIT - HEALTHEAST (OUTPATIENT)
Dept: INTERNAL MEDICINE | Facility: CLINIC | Age: 65
End: 2020-11-18

## 2020-11-18 DIAGNOSIS — G40.909 SEIZURE DISORDER (H): ICD-10-CM

## 2020-11-18 DIAGNOSIS — Z23 NEED FOR PNEUMOCOCCAL VACCINE: ICD-10-CM

## 2020-11-18 DIAGNOSIS — Z12.11 SCREEN FOR COLON CANCER: ICD-10-CM

## 2020-11-18 DIAGNOSIS — I10 ESSENTIAL HYPERTENSION: ICD-10-CM

## 2020-11-18 DIAGNOSIS — E03.9 ACQUIRED HYPOTHYROIDISM: ICD-10-CM

## 2020-11-18 DIAGNOSIS — M89.9 DISORDER OF BONE AND CARTILAGE: ICD-10-CM

## 2020-11-18 DIAGNOSIS — D32.0 BENIGN NEOPLASM OF CEREBRAL MENINGES (H): ICD-10-CM

## 2020-11-18 DIAGNOSIS — Z23 NEED FOR IMMUNIZATION AGAINST INFLUENZA: ICD-10-CM

## 2020-11-18 DIAGNOSIS — E78.2 MIXED HYPERLIPIDEMIA: ICD-10-CM

## 2020-11-18 DIAGNOSIS — M94.9 DISORDER OF BONE AND CARTILAGE: ICD-10-CM

## 2020-11-18 LAB
ALBUMIN SERPL-MCNC: 4.2 G/DL (ref 3.5–5)
ALP SERPL-CCNC: 117 U/L (ref 45–120)
ALT SERPL W P-5'-P-CCNC: 14 U/L (ref 0–45)
ANION GAP SERPL CALCULATED.3IONS-SCNC: 11 MMOL/L (ref 5–18)
AST SERPL W P-5'-P-CCNC: 15 U/L (ref 0–40)
BILIRUB DIRECT SERPL-MCNC: 0.4 MG/DL
BILIRUB SERPL-MCNC: 1.4 MG/DL (ref 0–1)
BUN SERPL-MCNC: 15 MG/DL (ref 8–22)
CALCIUM SERPL-MCNC: 9.2 MG/DL (ref 8.5–10.5)
CHLORIDE BLD-SCNC: 107 MMOL/L (ref 98–107)
CHOLEST SERPL-MCNC: 166 MG/DL
CO2 SERPL-SCNC: 25 MMOL/L (ref 22–31)
CREAT SERPL-MCNC: 0.58 MG/DL (ref 0.6–1.1)
ERYTHROCYTE [DISTWIDTH] IN BLOOD BY AUTOMATED COUNT: 13.2 % (ref 11–14.5)
FASTING STATUS PATIENT QL REPORTED: YES
GFR SERPL CREATININE-BSD FRML MDRD: >60 ML/MIN/1.73M2
GLUCOSE BLD-MCNC: 101 MG/DL (ref 70–125)
HCT VFR BLD AUTO: 44.1 % (ref 35–47)
HDLC SERPL-MCNC: 72 MG/DL
HGB BLD-MCNC: 13.9 G/DL (ref 12–16)
LDLC SERPL CALC-MCNC: 82 MG/DL
MCH RBC QN AUTO: 26.1 PG (ref 27–34)
MCHC RBC AUTO-ENTMCNC: 31.5 G/DL (ref 32–36)
MCV RBC AUTO: 83 FL (ref 80–100)
PLATELET # BLD AUTO: 214 THOU/UL (ref 140–440)
PMV BLD AUTO: 10.3 FL (ref 8.5–12.5)
POTASSIUM BLD-SCNC: 3.6 MMOL/L (ref 3.5–5)
PROT SERPL-MCNC: 7.4 G/DL (ref 6–8)
RBC # BLD AUTO: 5.33 MILL/UL (ref 3.8–5.4)
SODIUM SERPL-SCNC: 143 MMOL/L (ref 136–145)
T4 FREE SERPL-MCNC: 1.2 NG/DL (ref 0.7–1.8)
TRIGL SERPL-MCNC: 62 MG/DL
TSH SERPL DL<=0.005 MIU/L-ACNC: 1.62 UIU/ML (ref 0.3–5)
WBC: 3.7 THOU/UL (ref 4–11)

## 2020-11-19 ENCOUNTER — COMMUNICATION - HEALTHEAST (OUTPATIENT)
Dept: INTERNAL MEDICINE | Facility: CLINIC | Age: 65
End: 2020-11-19

## 2020-11-19 LAB — 25(OH)D3 SERPL-MCNC: 30.9 NG/ML (ref 30–80)

## 2020-12-14 ENCOUNTER — HEALTH MAINTENANCE LETTER (OUTPATIENT)
Age: 65
End: 2020-12-14

## 2020-12-21 ENCOUNTER — COMMUNICATION - HEALTHEAST (OUTPATIENT)
Dept: INTERNAL MEDICINE | Facility: CLINIC | Age: 65
End: 2020-12-21

## 2020-12-21 DIAGNOSIS — G40.909 SEIZURE DISORDER (H): ICD-10-CM

## 2020-12-29 ENCOUNTER — AMBULATORY - HEALTHEAST (OUTPATIENT)
Dept: FAMILY MEDICINE | Facility: CLINIC | Age: 65
End: 2020-12-29

## 2020-12-29 DIAGNOSIS — G40.909 SEIZURE DISORDER (H): ICD-10-CM

## 2021-02-21 ENCOUNTER — RECORDS - HEALTHEAST (OUTPATIENT)
Dept: ADMINISTRATIVE | Facility: OTHER | Age: 66
End: 2021-02-21

## 2021-02-22 DIAGNOSIS — G47.33 OSA (OBSTRUCTIVE SLEEP APNEA): Primary | ICD-10-CM

## 2021-03-16 ENCOUNTER — AMBULATORY - HEALTHEAST (OUTPATIENT)
Dept: NURSING | Facility: CLINIC | Age: 66
End: 2021-03-16

## 2021-03-23 ENCOUNTER — OFFICE VISIT - HEALTHEAST (OUTPATIENT)
Dept: INTERNAL MEDICINE | Facility: CLINIC | Age: 66
End: 2021-03-23

## 2021-03-23 DIAGNOSIS — G40.909 SEIZURE DISORDER (H): ICD-10-CM

## 2021-03-23 DIAGNOSIS — E03.9 ACQUIRED HYPOTHYROIDISM: ICD-10-CM

## 2021-03-23 DIAGNOSIS — E78.2 MIXED HYPERLIPIDEMIA: ICD-10-CM

## 2021-03-23 DIAGNOSIS — I10 ESSENTIAL HYPERTENSION: ICD-10-CM

## 2021-03-23 DIAGNOSIS — D32.0 BENIGN NEOPLASM OF CEREBRAL MENINGES (H): ICD-10-CM

## 2021-03-23 ASSESSMENT — PATIENT HEALTH QUESTIONNAIRE - PHQ9: SUM OF ALL RESPONSES TO PHQ QUESTIONS 1-9: 2

## 2021-03-23 ASSESSMENT — MIFFLIN-ST. JEOR: SCORE: 1041.64

## 2021-04-06 ENCOUNTER — AMBULATORY - HEALTHEAST (OUTPATIENT)
Dept: NURSING | Facility: CLINIC | Age: 66
End: 2021-04-06

## 2021-04-18 ENCOUNTER — HEALTH MAINTENANCE LETTER (OUTPATIENT)
Age: 66
End: 2021-04-18

## 2021-05-02 ENCOUNTER — COMMUNICATION - HEALTHEAST (OUTPATIENT)
Dept: INTERNAL MEDICINE | Facility: CLINIC | Age: 66
End: 2021-05-02

## 2021-05-02 DIAGNOSIS — M15.0 PRIMARY OSTEOARTHRITIS INVOLVING MULTIPLE JOINTS: ICD-10-CM

## 2021-05-25 ENCOUNTER — RECORDS - HEALTHEAST (OUTPATIENT)
Dept: ADMINISTRATIVE | Facility: CLINIC | Age: 66
End: 2021-05-25

## 2021-05-26 ENCOUNTER — RECORDS - HEALTHEAST (OUTPATIENT)
Dept: ADMINISTRATIVE | Facility: CLINIC | Age: 66
End: 2021-05-26

## 2021-05-27 ENCOUNTER — RECORDS - HEALTHEAST (OUTPATIENT)
Dept: ADMINISTRATIVE | Facility: CLINIC | Age: 66
End: 2021-05-27

## 2021-05-27 ASSESSMENT — PATIENT HEALTH QUESTIONNAIRE - PHQ9
SUM OF ALL RESPONSES TO PHQ QUESTIONS 1-9: 2
SUM OF ALL RESPONSES TO PHQ QUESTIONS 1-9: 10

## 2021-05-28 ENCOUNTER — RECORDS - HEALTHEAST (OUTPATIENT)
Dept: ADMINISTRATIVE | Facility: CLINIC | Age: 66
End: 2021-05-28

## 2021-05-29 ENCOUNTER — RECORDS - HEALTHEAST (OUTPATIENT)
Dept: ADMINISTRATIVE | Facility: CLINIC | Age: 66
End: 2021-05-29

## 2021-05-30 ENCOUNTER — RECORDS - HEALTHEAST (OUTPATIENT)
Dept: ADMINISTRATIVE | Facility: CLINIC | Age: 66
End: 2021-05-30

## 2021-05-30 VITALS — WEIGHT: 144 LBS | BODY MASS INDEX: 28.27 KG/M2 | HEIGHT: 60 IN

## 2021-05-30 VITALS — BODY MASS INDEX: 28.27 KG/M2 | WEIGHT: 144 LBS | HEIGHT: 60 IN

## 2021-05-30 NOTE — PROGRESS NOTES
Office Visit - Follow Up   Suzan Ballard   63 y.o. female    Date of Visit: 7/22/2019    Chief Complaint   Patient presents with     Hand Pain     Right thumb pain and swelling x 1-2 months, no fall, maybe bumped it        Assessment and Plan   1. Thumb pain, right  Right thumb pain for almost 2 months now.  Pains come off and on.  There is also accompanying swelling.  No injury or trauma to the right thumb.  Concerning for gout although location of gout is not typical.  Will check uric acid and sed rate.  Will treat with prednisone 10 g 1 tablet daily for 5 days.  - Uric Acid  - Sedimentation Rate  - predniSONE (DELTASONE) 10 mg tablet; Take 10 mg by mouth daily for 5 days.  Dispense: 5 tablet; Refill: 0    2. Essential hypertension  Controlled.  Continue amlodipine.    3. Mixed hyperlipidemia  Controlled.  Last lipids were good.  Continue simvastatin.      Follow up in 1 week if her right thumb pains still persist.     History of Present Illness   This 63 y.o. old female complains of right thumb pain for almost  2 months now.  Pains come off and on.  Happened when she was still in the [facility].  Denies injury or trauma to the right thumb.  When she experiences pains her right thumb MCP and PIP are swollen and tender.  Needs to be rule out for gout because gout is very prevalent among [facility].  But location of her pain is not typical for gout.  Has hypertension and hyperlipidemia controlled by her medications    Review of Systems   A 12 point comprehensive review of systems was negative except as noted..     Medications, Allergies and Problem List   Reviewed and updated             Chief Complaint   Hand Pain (Right thumb pain and swelling x 1-2 months, no fall, maybe bumped it)       Patient Profile   Social History     Social History Narrative     Not on file        Past Medical History   Patient Active Problem List   Diagnosis     Meningioma     Peripheral Neuropathy     Hypothyroidism      Hyperlipidemia     Carpal Tunnel Syndrome     Essential Hypertension     Osteopenia     Seizure disorder (H)     Osteoarthritis of right knee     GERD (gastroesophageal reflux disease)     Allergic rhinitis     Essential hypertension     Hyperlipidemia     Cooper's neuroma       Past Surgical History  She has no past surgical history on file.       Medications and Allergies   Current Outpatient Medications   Medication Sig     ALPRAZolam (XANAX) 0.25 MG tablet Take 1 tablet (0.25 mg total) by mouth 3 (three) times a day as needed for anxiety.     amLODIPine (NORVASC) 5 MG tablet Take 2 tablets (10 mg total) by mouth daily.     fluticasone (FLONASE) 50 mcg/actuation nasal spray USE IN EACH NOSTRIL EVERY DAY     ibuprofen (ADVIL,MOTRIN) 800 MG tablet Take 1 tablet (800 mg total) by mouth every 8 (eight) hours as needed for pain.     lamoTRIgine (LAMICTAL) 100 MG tablet Take 1.5 tablets (150 mg total) by mouth 2 (two) times a day.     levothyroxine (SYNTHROID, LEVOTHROID) 88 MCG tablet Take 1 tablet (88 mcg total) by mouth Daily at 6:00 am.     meclizine (ANTIVERT) 25 mg tablet Take 1 tablet (25 mg total) by mouth 3 (three) times a day as needed for dizziness or nausea.     simvastatin (ZOCOR) 20 MG tablet Take 1 tablet (20 mg total) by mouth every evening.     predniSONE (DELTASONE) 10 mg tablet Take 10 mg by mouth daily for 5 days.     Allergies   Allergen Reactions     Codeine Nausea And Vomiting        Family and Social History   No family history on file.     Social History     Tobacco Use     Smoking status: Never Smoker     Smokeless tobacco: Never Used   Substance Use Topics     Alcohol use: No     Drug use: No      Physical Exam       Physical Exam  /72 (Patient Site: Left Arm, Patient Position: Sitting, Cuff Size: Adult Large)   Pulse (!) 59   Ht 5' (1.524 m)   Wt 129 lb (58.5 kg)   SpO2 96%   BMI 25.19 kg/m    General appearance: alert, appears stated age, cooperative and no distress  Head:  Normocephalic, without obvious abnormality, atraumatic  Throat: lips, mucosa, and tongue normal; teeth and gums normal  Neck: no adenopathy, no carotid bruit, no JVD, supple, symmetrical, trachea midline and thyroid not enlarged, symmetric, no tenderness/mass/nodules  Lungs: clear to auscultation bilaterally  Heart: regular rate and rhythm, S1, S2 normal, no murmur, click, rub or gallop  Abdomen: soft, non-tender; bowel sounds normal; no masses,  no organomegaly  Extremities: Right thumb is tender, mildly swollen on the MCP and  PIP but has normal range of motion     Additional Information        Lamont Kapadia MD  Internal Medicine  Contact me at [phone]     Additional Information   Current Outpatient Medications   Medication Sig     ALPRAZolam (XANAX) 0.25 MG tablet Take 1 tablet (0.25 mg total) by mouth 3 (three) times a day as needed for anxiety.     amLODIPine (NORVASC) 5 MG tablet Take 2 tablets (10 mg total) by mouth daily.     fluticasone (FLONASE) 50 mcg/actuation nasal spray USE IN EACH NOSTRIL EVERY DAY     ibuprofen (ADVIL,MOTRIN) 800 MG tablet Take 1 tablet (800 mg total) by mouth every 8 (eight) hours as needed for pain.     lamoTRIgine (LAMICTAL) 100 MG tablet Take 1.5 tablets (150 mg total) by mouth 2 (two) times a day.     levothyroxine (SYNTHROID, LEVOTHROID) 88 MCG tablet Take 1 tablet (88 mcg total) by mouth Daily at 6:00 am.     meclizine (ANTIVERT) 25 mg tablet Take 1 tablet (25 mg total) by mouth 3 (three) times a day as needed for dizziness or nausea.     simvastatin (ZOCOR) 20 MG tablet Take 1 tablet (20 mg total) by mouth every evening.     predniSONE (DELTASONE) 10 mg tablet Take 10 mg by mouth daily for 5 days.     Allergies   Allergen Reactions     Codeine Nausea And Vomiting     Social History     Tobacco Use     Smoking status: Never Smoker     Smokeless tobacco: Never Used   Substance Use Topics     Alcohol use: No     Drug use: No         Time: total time spent with the  patient was 25 minutes of which >50% was spent in counseling and coordination of care

## 2021-05-31 VITALS — BODY MASS INDEX: 28.27 KG/M2 | WEIGHT: 144 LBS | HEIGHT: 60 IN

## 2021-05-31 VITALS — HEIGHT: 60 IN | BODY MASS INDEX: 28.66 KG/M2 | WEIGHT: 146 LBS

## 2021-05-31 VITALS — BODY MASS INDEX: 28.86 KG/M2 | WEIGHT: 147.75 LBS

## 2021-05-31 VITALS — WEIGHT: 145 LBS | BODY MASS INDEX: 28.32 KG/M2

## 2021-06-01 ENCOUNTER — RECORDS - HEALTHEAST (OUTPATIENT)
Dept: ADMINISTRATIVE | Facility: CLINIC | Age: 66
End: 2021-06-01

## 2021-06-01 VITALS — BODY MASS INDEX: 26.9 KG/M2 | HEIGHT: 60 IN | WEIGHT: 137 LBS

## 2021-06-01 VITALS — BODY MASS INDEX: 25.32 KG/M2 | WEIGHT: 129 LBS | HEIGHT: 60 IN

## 2021-06-01 VITALS — BODY MASS INDEX: 25.91 KG/M2 | HEIGHT: 60 IN | WEIGHT: 132 LBS

## 2021-06-01 NOTE — PROGRESS NOTES
Office Visit - Follow Up   Suzan Ballard   63 y.o. female    Date of Visit: 9/4/2019    Chief Complaint   Patient presents with     Follow-up     going back to [facility] in 2 weeks- needs all meds refilled      Hand Pain     R hand, thump when flexing down causes pain and clicking feeling         Assessment and Plan   1. Essential hypertension  Controlled by amlodipine.  Reports she is having systolic blood pressure in the 90s to 100s.  Wants to cut down her amlodipine dose.  Okay to cut down her amlodipine  from 10 mg to 5 mg daily.  Will have her blood pressure check 2 times a week here in the clinic until her next visit with me in 2 weeks because I want her blood pressure  to be well controlled before she travels to the [facility] in 3 weeks.  - amLODIPine (NORVASC) 5 MG tablet; Take 2 tablets (10 mg total) by mouth daily.  Dispense: 180 tablet; Refill: 3    2. Mixed hyperlipidemia  Controlled.  Continue simvastatin.  Last lipids were good.  - simvastatin (ZOCOR) 20 MG tablet; Take 1 tablet (20 mg total) by mouth every evening.  Dispense: 90 tablet; Refill: 3    3. Acquired hypothyroidism  Supplemented by levothyroxine.  Last thyroid function was normal.  - levothyroxine (SYNTHROID, LEVOTHROID) 88 MCG tablet; Take 1 tablet (88 mcg total) by mouth Daily at 6:00 am.  Dispense: 180 tablet; Refill: 3    4. Seizure disorder (H)  No  no recurrence or breakthrough.  Continue Lamictal.  Gets dizzy intermittently.  Okay to take meclizine.  - meclizine (ANTIVERT) 25 mg tablet; Take 1 tablet (25 mg total) by mouth 3 (three) times a day as needed for dizziness or nausea.  Dispense: 30 tablet; Refill: 4  - lamoTRIgine (LAMICTAL) 100 MG tablet; Take 1.5 tablets (150 mg total) by mouth 2 (two) times a day.  Dispense: 270 tablet; Refill: 3    5. Allergic rhinitis, unspecified seasonality, unspecified trigger  Has allergic rhinitis.  Uses fluticasone nasal spray which works.  Okay to continue.  - fluticasone propionate  (FLONASE) 50 mcg/actuation nasal spray; USE IN EACH NOSTRIL EVERY DAY  Dispense: 48 g; Refill: 3    6. Adjustment disorder with anxious mood  Has generalized anxiety disorder because of problem with her  now they are .  Wants to bring with her alprazolam to the [facility] in case she gets anxious again.  - ALPRAZolam (XANAX) 0.25 MG tablet; Take 1 tablet (0.25 mg total) by mouth 3 (three) times a day as needed for anxiety.  Dispense: 30 tablet; Refill: 0    7. Primary osteoarthritis involving multiple joints  Has aches and pains involving her fingers.  Continue ibuprofen for pain as needed.  - ibuprofen (ADVIL,MOTRIN) 800 MG tablet; Take 1 tablet (800 mg total) by mouth every 8 (eight) hours as needed for pain.  Dispense: 90 tablet; Refill: 3    8. Screen for colon cancer  Due for colon cancer screening.  - Ambulatory referral for Colonoscopy      Follow up in 3 weeks before she travels to the [facility] where she  will stay for the winter.     History of Present Illness   This 63 y.o. old female is here for follow-up.  Will travel to the [facility] in 3 weeks and will stay there for the winter.  Wants to get all her medication refill.  Wants to try cutting down her amlodipine from 10 mg daily to 5 mg daily because experiencing low systolic readings in the 90-100s.  Has adjustment disorder primarily anxiety because of her problems with her  and now  they are .  Wants to continue alprazolam as needed and to bring this medication to the [facility].  Has osteoarthritis, this time having finger joint pains.  Takes ibuprofen which works.  Has hyperlipidemia and hypertension controlled by her medications.  Overall, she is doing and feeling well.  No other complaints.    Review of Systems   A 12 point comprehensive review of systems was negative except as noted..     Medications, Allergies and Problem List   Reviewed and updated             Chief Complaint   Follow-up (going back to  [place] in 2 weeks- needs all meds refilled ) and Hand Pain (R hand, thump when flexing down causes pain and clicking feeling )       Patient Profile   Social History     Social History Narrative     Not on file        Past Medical History   Patient Active Problem List   Diagnosis     Meningioma     Peripheral Neuropathy     Hypothyroidism     Hyperlipidemia     Carpal Tunnel Syndrome     Essential Hypertension     Osteopenia     Seizure disorder (H)     Osteoarthritis of right knee     GERD (gastroesophageal reflux disease)     Allergic rhinitis     Essential hypertension     Hyperlipidemia     Cooper's neuroma       Past Surgical History  She has no past surgical history on file.       Medications and Allergies   Current Outpatient Medications   Medication Sig     ALPRAZolam (XANAX) 0.25 MG tablet Take 1 tablet (0.25 mg total) by mouth 3 (three) times a day as needed for anxiety.     amLODIPine (NORVASC) 5 MG tablet Take 2 tablets (10 mg total) by mouth daily.     fluticasone propionate (FLONASE) 50 mcg/actuation nasal spray USE IN EACH NOSTRIL EVERY DAY     ibuprofen (ADVIL,MOTRIN) 800 MG tablet Take 1 tablet (800 mg total) by mouth every 8 (eight) hours as needed for pain.     lamoTRIgine (LAMICTAL) 100 MG tablet Take 1.5 tablets (150 mg total) by mouth 2 (two) times a day.     levothyroxine (SYNTHROID, LEVOTHROID) 88 MCG tablet Take 1 tablet (88 mcg total) by mouth Daily at 6:00 am.     meclizine (ANTIVERT) 25 mg tablet Take 1 tablet (25 mg total) by mouth 3 (three) times a day as needed for dizziness or nausea.     simvastatin (ZOCOR) 20 MG tablet Take 1 tablet (20 mg total) by mouth every evening.     Allergies   Allergen Reactions     Codeine Nausea And Vomiting        Family and Social History   No family history on file.     Social History     Tobacco Use     Smoking status: Never Smoker     Smokeless tobacco: Never Used   Substance Use Topics     Alcohol use: No     Drug use: No      Physical Exam        Physical Exam  /64   Pulse 66   Ht 5' (1.524 m)   Wt 129 lb (58.5 kg)   SpO2 96%   BMI 25.19 kg/m    General appearance: alert, appears stated age, cooperative and no distress  Head: Normocephalic, without obvious abnormality, atraumatic  Throat: lips, mucosa, and tongue normal; teeth and gums normal  Neck: no adenopathy, no carotid bruit, no JVD, supple, symmetrical, trachea midline and thyroid not enlarged, symmetric, no tenderness/mass/nodules  Lungs: clear to auscultation bilaterally  Heart: regular rate and rhythm, S1, S2 normal, no murmur, click, rub or gallop  Abdomen: soft, non-tender; bowel sounds normal; no masses,  no organomegaly  Extremities: extremities normal, atraumatic, no cyanosis or edema  Skin: Skin color, texture, turgor normal. No rashes or lesions  Neurologic: Grossly normal  Musculoskeletal: Normal finger and hand  joint exam  Psychiatric: Sad affect and mood     Additional Information        Lamont Kapadia MD  Internal Medicine  Contact me at [phone]     Additional Information   Current Outpatient Medications   Medication Sig     ALPRAZolam (XANAX) 0.25 MG tablet Take 1 tablet (0.25 mg total) by mouth 3 (three) times a day as needed for anxiety.     amLODIPine (NORVASC) 5 MG tablet Take 2 tablets (10 mg total) by mouth daily.     fluticasone propionate (FLONASE) 50 mcg/actuation nasal spray USE IN EACH NOSTRIL EVERY DAY     ibuprofen (ADVIL,MOTRIN) 800 MG tablet Take 1 tablet (800 mg total) by mouth every 8 (eight) hours as needed for pain.     lamoTRIgine (LAMICTAL) 100 MG tablet Take 1.5 tablets (150 mg total) by mouth 2 (two) times a day.     levothyroxine (SYNTHROID, LEVOTHROID) 88 MCG tablet Take 1 tablet (88 mcg total) by mouth Daily at 6:00 am.     meclizine (ANTIVERT) 25 mg tablet Take 1 tablet (25 mg total) by mouth 3 (three) times a day as needed for dizziness or nausea.     simvastatin (ZOCOR) 20 MG tablet Take 1 tablet (20 mg total) by mouth every  evening.     Allergies   Allergen Reactions     Codeine Nausea And Vomiting     Social History     Tobacco Use     Smoking status: Never Smoker     Smokeless tobacco: Never Used   Substance Use Topics     Alcohol use: No     Drug use: No         Time: total time spent with the patient was 25 minutes of which >50% was spent in counseling and coordination of care

## 2021-06-01 NOTE — PROGRESS NOTES
Office Visit - Follow Up   Suzan Ballard   63 y.o. female    Date of Visit: 9/18/2019    Chief Complaint   Patient presents with     Follow-up     fasting, [place]. R Hand pain- thumb wants prednisone      Flu Vaccine        Assessment and Plan   1. Mixed hyperlipidemia  Controlled.  Last lipids were good, LDL 81, HDL 62, triglyceride 91 and total cholesterol 169 on 3/14/2019.  Fasting today.  Check lipids and liver function.  - simvastatin (ZOCOR) 20 MG tablet; Take 1 tablet (20 mg total) by mouth every evening.  Dispense: 90 tablet; Refill: 3  - Lipid Cascade  - Hepatic Profile    2. Essential hypertension  Controlled.  Continue amlodipine.  Wants to decrease her amlodipine dose but it is controlling her blood pressure well.  Advised to continue same dose of  amlodipine since she will be also traveling to the [facility] in a few days.  Check BMP and CBC.  - Basic Metabolic Panel  - HM2(CBC w/o Differential)    3. Acquired hypothyroidism  Supplemented by levothyroxine.  TSH and free T4 were good on 3/14/2019.  No need to repeat today.    4. Seizure disorder (H)  No recurrence of breakthrough.  Followed by neurology, Dr. Perez.  Was last seen in November 2018.  Supposed to see him last month but rescheduleD her appointment for January.  Continue Lamictal.    5. Osteopenia  Has osteopenia.  Takes calcium with vitamin D.  Due for bone density.  Will schedule this after she comes back from the [facility] in January.  Check vitamin D.  - Vitamin D, Total (25-Hydroxy)    6. Primary osteoarthritis involving multiple joints  Has aches and pains involving his right knee and right hand  specifically the first MCP due to osteoarthritis.  Advised to bring with her Celebrex 200 mg tablet to the [facility] in case she has recurrence of her aches and pains.  Can take this daily as needed.  - celecoxib (CELEBREX) 200 MG capsule; Take 1 capsule (200 mg total) by mouth daily.  Dispense: 90 capsule;  Refill: 1    7. Need for immunization against influenza  Flu shot was given.  - Influenza High Dose, Seasonal 65+ yrs    Only to travel to the [facility].      Follow up in in 4 months after she comes back from the [facility].     History of Present Illness   This 63 y.o. old female is here for follow-up.  Has seizure disorder.  Followed by neurology.  Takes Lamictal.  Has not had any recurrence or breakthrough seizure.  Has hyperlipidemia and hypertension controlled by her medications, amlodipine and simvastatin respectively.  Has acquired hypothyroidism supplemented by levothyroxine.  Has osteopenia.  Takes calcium with vitamin D.  Due for bone density.  Has aches and pains involving her right knee and right hand specifically the first MCP.  Overall, she is doing and feeling well.    Review of Systems   A 12 point comprehensive review of systems was negative except as noted..     Medications, Allergies and Problem List   Reviewed and updated             Chief Complaint   Follow-up (fasting, leaving [place] for [place]. R Hand pain- thumb wants prednisone ) and Flu Vaccine       Patient Profile   Social History     Social History Narrative     Not on file        Past Medical History   Patient Active Problem List   Diagnosis     Meningioma     Peripheral Neuropathy     Hypothyroidism     Hyperlipidemia     Carpal Tunnel Syndrome     Essential Hypertension     Osteopenia     Seizure disorder (H)     Osteoarthritis of right knee     GERD (gastroesophageal reflux disease)     Allergic rhinitis     Essential hypertension     Hyperlipidemia     Cooper's neuroma       Past Surgical History  She has no past surgical history on file.       Medications and Allergies   Current Outpatient Medications   Medication Sig     ALPRAZolam (XANAX) 0.25 MG tablet Take 1 tablet (0.25 mg total) by mouth 3 (three) times a day as needed for anxiety.     amLODIPine (NORVASC) 5 MG tablet Take 2 tablets (10 mg total) by mouth daily.      fluticasone propionate (FLONASE) 50 mcg/actuation nasal spray USE IN EACH NOSTRIL EVERY DAY     ibuprofen (ADVIL,MOTRIN) 800 MG tablet Take 1 tablet (800 mg total) by mouth every 8 (eight) hours as needed for pain.     lamoTRIgine (LAMICTAL) 100 MG tablet Take 1.5 tablets (150 mg total) by mouth 2 (two) times a day.     levothyroxine (SYNTHROID, LEVOTHROID) 88 MCG tablet Take 1 tablet (88 mcg total) by mouth Daily at 6:00 am.     meclizine (ANTIVERT) 25 mg tablet Take 1 tablet (25 mg total) by mouth 3 (three) times a day as needed for dizziness or nausea.     celecoxib (CELEBREX) 200 MG capsule Take 1 capsule (200 mg total) by mouth daily.     simvastatin (ZOCOR) 20 MG tablet Take 1 tablet (20 mg total) by mouth every evening.     Allergies   Allergen Reactions     Codeine Nausea And Vomiting        Family and Social History   No family history on file.     Social History     Tobacco Use     Smoking status: Never Smoker     Smokeless tobacco: Never Used   Substance Use Topics     Alcohol use: No     Drug use: No      Physical Exam       Physical Exam  /70   Pulse 69   Ht 5' (1.524 m)   Wt 126 lb (57.2 kg)   SpO2 96%   BMI 24.61 kg/m    General appearance: alert, appears stated age, cooperative and no distress  Head: Normocephalic, without obvious abnormality, atraumatic  Throat: lips, mucosa, and tongue normal; teeth and gums normal  Neck: no adenopathy, no carotid bruit, no JVD, supple, symmetrical, trachea midline and thyroid not enlarged, symmetric, no tenderness/mass/nodules  Lungs: clear to auscultation bilaterally  Heart: regular rate and rhythm, S1, S2 normal, no murmur, click, rub or gallop  Abdomen: soft, non-tender; bowel sounds normal; no masses,  no organomegaly  Extremities: extremities normal, atraumatic, no cyanosis or edema  Skin: Skin color, texture, turgor normal. No rashes or lesions  Musculoskeletal: Normal right knee and right hand joint exam     Additional Information        Lamont  JOSE Kapadia MD  Internal Medicine  Contact me at [phone]     Additional Information   Current Outpatient Medications   Medication Sig     ALPRAZolam (XANAX) 0.25 MG tablet Take 1 tablet (0.25 mg total) by mouth 3 (three) times a day as needed for anxiety.     amLODIPine (NORVASC) 5 MG tablet Take 2 tablets (10 mg total) by mouth daily.     fluticasone propionate (FLONASE) 50 mcg/actuation nasal spray USE IN EACH NOSTRIL EVERY DAY     ibuprofen (ADVIL,MOTRIN) 800 MG tablet Take 1 tablet (800 mg total) by mouth every 8 (eight) hours as needed for pain.     lamoTRIgine (LAMICTAL) 100 MG tablet Take 1.5 tablets (150 mg total) by mouth 2 (two) times a day.     levothyroxine (SYNTHROID, LEVOTHROID) 88 MCG tablet Take 1 tablet (88 mcg total) by mouth Daily at 6:00 am.     meclizine (ANTIVERT) 25 mg tablet Take 1 tablet (25 mg total) by mouth 3 (three) times a day as needed for dizziness or nausea.     celecoxib (CELEBREX) 200 MG capsule Take 1 capsule (200 mg total) by mouth daily.     simvastatin (ZOCOR) 20 MG tablet Take 1 tablet (20 mg total) by mouth every evening.     Allergies   Allergen Reactions     Codeine Nausea And Vomiting     Social History     Tobacco Use     Smoking status: Never Smoker     Smokeless tobacco: Never Used   Substance Use Topics     Alcohol use: No     Drug use: No         Time: total time spent with the patient was 25 minutes of which >50% was spent in counseling and coordination of care

## 2021-06-02 VITALS — BODY MASS INDEX: 24.54 KG/M2 | WEIGHT: 125 LBS | HEIGHT: 60 IN

## 2021-06-02 VITALS — HEIGHT: 60 IN | BODY MASS INDEX: 24.74 KG/M2 | WEIGHT: 126 LBS

## 2021-06-02 VITALS — HEIGHT: 60 IN | BODY MASS INDEX: 24.94 KG/M2 | WEIGHT: 127 LBS

## 2021-06-02 VITALS — HEIGHT: 60 IN | BODY MASS INDEX: 25.21 KG/M2 | WEIGHT: 128.4 LBS

## 2021-06-02 VITALS — HEIGHT: 60 IN | BODY MASS INDEX: 24.66 KG/M2 | WEIGHT: 125.6 LBS

## 2021-06-03 VITALS
DIASTOLIC BLOOD PRESSURE: 70 MMHG | WEIGHT: 126 LBS | SYSTOLIC BLOOD PRESSURE: 132 MMHG | HEART RATE: 69 BPM | OXYGEN SATURATION: 96 % | BODY MASS INDEX: 24.74 KG/M2 | HEIGHT: 60 IN

## 2021-06-03 VITALS
SYSTOLIC BLOOD PRESSURE: 102 MMHG | HEART RATE: 66 BPM | WEIGHT: 129 LBS | HEIGHT: 60 IN | BODY MASS INDEX: 25.32 KG/M2 | DIASTOLIC BLOOD PRESSURE: 64 MMHG | OXYGEN SATURATION: 96 %

## 2021-06-03 VITALS — BODY MASS INDEX: 25.32 KG/M2 | WEIGHT: 129 LBS | HEIGHT: 60 IN

## 2021-06-04 VITALS
DIASTOLIC BLOOD PRESSURE: 58 MMHG | OXYGEN SATURATION: 98 % | HEIGHT: 60 IN | BODY MASS INDEX: 24.74 KG/M2 | SYSTOLIC BLOOD PRESSURE: 104 MMHG | WEIGHT: 126 LBS | HEART RATE: 75 BPM

## 2021-06-04 VITALS
OXYGEN SATURATION: 98 % | HEART RATE: 59 BPM | BODY MASS INDEX: 25 KG/M2 | SYSTOLIC BLOOD PRESSURE: 116 MMHG | WEIGHT: 128 LBS | DIASTOLIC BLOOD PRESSURE: 62 MMHG

## 2021-06-04 NOTE — TELEPHONE ENCOUNTER
Refill requested too soon.  Simvastatin filled 9/18/19 #90 tablets with 3 refills.    Katy Wren, RN  Triage Nurse Advisor

## 2021-06-05 VITALS
BODY MASS INDEX: 25.32 KG/M2 | WEIGHT: 129 LBS | SYSTOLIC BLOOD PRESSURE: 126 MMHG | HEART RATE: 63 BPM | DIASTOLIC BLOOD PRESSURE: 72 MMHG | HEIGHT: 60 IN | OXYGEN SATURATION: 97 %

## 2021-06-07 NOTE — TELEPHONE ENCOUNTER
Refill Approved    Rx renewed per Medication Renewal Policy. Medication was last renewed on 9/4/19.    Susy Akins, [facility] Triage/Med Refill 4/2/2020     Requested Prescriptions   Pending Prescriptions Disp Refills     lamoTRIgine (LAMICTAL) 100 MG tablet [Pharmacy Med Name: LAMOTRIGINE 100MG TABLETS] 270 tablet 3     Sig: TAKE ONE AND ONE-HALF TABLETS TWICE DAILY       Lamotrigine Refill Protocol Passed - 4/1/2020  5:17 AM        Passed - CBC (Hemogram 2) in last year      WBC   Date Value Ref Range Status   09/18/2019 3.7 (L) 4.0 - 11.0 thou/uL Final     RBC   Date Value Ref Range Status   09/18/2019 5.38 3.80 - 5.40 mill/uL Final     Hemoglobin   Date Value Ref Range Status   09/18/2019 14.5 12.0 - 16.0 g/dL Final     Hematocrit   Date Value Ref Range Status   09/18/2019 44.7 35.0 - 47.0 % Final     MCV   Date Value Ref Range Status   09/18/2019 83 80 - 100 fL Final     MCH   Date Value Ref Range Status   09/18/2019 27.0 27.0 - 34.0 pg Final     MCHC   Date Value Ref Range Status   09/18/2019 32.6 32.0 - 36.0 g/dL Final     RDW   Date Value Ref Range Status   09/18/2019 12.4 11.0 - 14.5 % Final     Platelets   Date Value Ref Range Status   09/18/2019 263 140 - 440 thou/uL Final     MPV   Date Value Ref Range Status   09/18/2019 6.9 (L) 7.0 - 10.0 fL Final                Passed - PCP or prescribing provider visit in past 12 months       Last office visit with prescriber/PCP: 9/18/2019 Lamont Kapadia MD OR same dept: 9/18/2019 Lamont Kapadia MD OR same specialty: 9/18/2019 Lamont Kapadia MD  Last physical: Visit date not found Last MTM visit: Visit date not found   Next visit within 3 mo: Visit date not found  Next physical within 3 mo: Visit date not found  Prescriber OR PCP: Lamont Kapadia MD  Last diagnosis associated with med order: 1. Seizure disorder (H)  - lamoTRIgine (LAMICTAL) 100 MG tablet [Pharmacy Med Name: LAMOTRIGINE 100MG TABLETS]; TAKE ONE AND ONE-HALF TABLETS TWICE  DAILY  Dispense: 270 tablet; Refill: 3    If protocol passes may refill for 12 months if within 3 months of last provider visit (or a total of 15 months).

## 2021-06-08 NOTE — PROGRESS NOTES
Office Visit - Follow Up   Suzan Ballard   61 y.o. female    Date of Visit: 2/7/2017    Chief Complaint   Patient presents with     Follow-up        Assessment and Plan   1. Essential hypertension  Now controlled with increased of her amlodipine to 5 mg twice a day. Was seen in the ER on 2/2/16 because of some tingling of the left arm and increased blood pressure. Workups including EKG, brain MRI, head and neck MRA and other labs were normal. Advised to see me for follow up after she was seen in the ED. Continue amlodipine 5 mg twice a day.   Has not had recurrence of her left arm numbness. Blood pressure is controlled. No complaints.     2. Hyperlipidemia  Controlled. Takes simvastatin.     3. Meningioma  Had excision of meningioma years ago. 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 No infarct and hemorrhage.     4. Cataract cortical, senile, bilateral  Was seen by Dr Cooney for decreased vision. Informed she has mild cataract. Has vision problems watching TV and at night. Declines to go back to Dr. Cooney. Refer to [facility].   - Ambulatory referral to Ophthalmology    5. Reviewed ED MD notes. Reviewed her normal troponin, cbc, bmp, INR, EKG and these were discussed with the patient. Reviewed her brain MRI, neck and head MRA and discussed the findings with the patient.     Follow up in 2 months. Advised to fast next visit. Due for her lipids check.      History of Present Illness   This 61 y.o. old female is here for ED follow up. Went to the ED on 2/2/17 for some tingling of the left arm and increased bp. Had blood pressure at home of 160/90 and with her left arm tingling decided to go to the ED. Was found her bp was increased to 155/95 and this normalized with extra amlodipine 5 mg given in the ED. All her workups including brain MRI, head and neck MRA and all other labs were normal. Was advised to see  me for follow up. Reports she is well and back to her baseline. Has hyperlipidemia controlled by simvastatin. Had meningioma s/p excision. No recurrence. Takes lamictal only and followed by neurology. Overall feeling and doing well.      Review of Systems   A 12 point comprehensive review of systems was negative except as noted..     Medications, Allergies and Problem List   Reviewed and updated             Chief Complaint   Follow-up       Patient Profile   Social History     Social History Narrative        Past Medical History   Patient Active Problem List   Diagnosis     Meningioma     Peripheral Neuropathy     Hypothyroidism     Hyperlipidemia     Carpal Tunnel Syndrome     Essential Hypertension     Osteopenia     Seizure disorder     Osteoarthritis of right knee     GERD (gastroesophageal reflux disease)     Allergic rhinitis     Essential hypertension     Hyperlipidemia     Hypothyroid       Past Surgical History  She has no past surgical history on file.       Medications and Allergies   Current Outpatient Prescriptions   Medication Sig     amLODIPine (NORVASC) 5 MG tablet Take 1 tablet (5 mg total) by mouth daily. (Patient taking differently: Take 5 mg by mouth 2 (two) times a day. )     fluticasone (FLONASE) 50 mcg/actuation nasal spray USE 1 SPRAY IN EACH NOSTRIL EVERY DAY     lamoTRIgine (LAMICTAL) 100 MG tablet Take 1.5 tablets (150 mg total) by mouth 2 (two) times a day.     levothyroxine (SYNTHROID, LEVOTHROID) 88 MCG tablet Take 1 tablet (88 mcg total) by mouth Daily at 6:00 am.     meclizine (ANTIVERT) 25 mg tablet Take 1 tablet (25 mg total) by mouth 3 (three) times a day as needed for dizziness or nausea.     simvastatin (ZOCOR) 20 MG tablet TAKE ONE TABLET BY MOUTH EVERY EVENING     Allergies   Allergen Reactions     Codeine         Family and Social History   No family history on file.     Social History   Substance Use Topics     Smoking status: Never Smoker     Smokeless tobacco: Never Used      Alcohol use No        Physical Exam       Physical Exam  Visit Vitals     /60     Pulse 65     Ht 5' (1.524 m)     Wt 144 lb (65.3 kg)     SpO2 94%     BMI 28.12 kg/m2     General appearance: alert, appears stated age, cooperative and no distress  Head: Normocephalic, without obvious abnormality, atraumatic  Nose: Nares normal. Septum midline. Mucosa normal. No drainage or sinus tenderness.  Throat: lips, mucosa, and tongue normal; teeth and gums normal  Neck: no adenopathy, no carotid bruit, no JVD, supple, symmetrical, trachea midline and thyroid not enlarged, symmetric, no tenderness/mass/nodules  Lungs: clear to auscultation bilaterally  Heart: regular rate and rhythm, S1, S2 normal, no murmur, click, rub or gallop  Abdomen: soft, non-tender; bowel sounds normal; no masses,  no organomegaly  Extremities: extremities normal, atraumatic, no cyanosis or edema  Neurologic: Alert and oriented X 3, normal strength and tone. Normal symmetric reflexes. Normal coordination and gait     Additional Information        Lamont Kapadia MD  Internal Medicine  Contact me at [phone]     Additional Information   Current Outpatient Prescriptions   Medication Sig     amLODIPine (NORVASC) 5 MG tablet Take 1 tablet (5 mg total) by mouth daily. (Patient taking differently: Take 5 mg by mouth 2 (two) times a day. )     fluticasone (FLONASE) 50 mcg/actuation nasal spray USE 1 SPRAY IN EACH NOSTRIL EVERY DAY     lamoTRIgine (LAMICTAL) 100 MG tablet Take 1.5 tablets (150 mg total) by mouth 2 (two) times a day.     levothyroxine (SYNTHROID, LEVOTHROID) 88 MCG tablet Take 1 tablet (88 mcg total) by mouth Daily at 6:00 am.     meclizine (ANTIVERT) 25 mg tablet Take 1 tablet (25 mg total) by mouth 3 (three) times a day as needed for dizziness or nausea.     simvastatin (ZOCOR) 20 MG tablet TAKE ONE TABLET BY MOUTH EVERY EVENING     Allergies   Allergen Reactions     Codeine      Social History   Substance Use Topics      Smoking status: Never Smoker     Smokeless tobacco: Never Used     Alcohol use No         Time: total time spent with the patient was 40 minutes of which >50% was spent in counseling and coordination of care

## 2021-06-10 NOTE — PROGRESS NOTES
Office Visit - Follow Up   Suzan Ballard   61 y.o. female    Date of Visit: 4/13/2017    Chief Complaint   Patient presents with     Follow-up     Fasting for labs. Would like to discuss reducing her blood pressure medication.         Assessment and Plan   1. Essential hypertension  Controlled.  Even shows low blood pressure at home.  Feels dizzy at times.  Amlodipine was increased twice a day in the emergency room when she presented there for increased blood pressure.  Now showing low blood pressure.  Will go back to her previous dose of amlodipine 5 mg daily.  Check CBC and basic metabolic panel.  - HM2(CBC w/o Differential)  - Basic Metabolic Panel    2. Hyperlipidemia  Controlled.  Continue simvastatin.  Check lipid lipids and liver.  - Lipid Cascade  - Hepatic Profile    3. Hypothyroidism  Supplemented by levothyroxine.  Wants to decrease the dose of her levothyroxine.  Informed that it will depend on her thyroid function.  Check TSH and free T4.  - Thyroid Stimulating Hormone (TSH)  - T4, Free    4. Seizure disorder  Has seizure disorder.  Followed by  Neurology, Dr. Perez.  Sees urology every 6 months.      Follow-up in 3 months.       History of Present Illness   This 61 y.o. old female is here for follow-up.  Complains of low blood pressure and some lightheadedness or dizziness.  Was seen in the emergency room for increased blood pressure and her amlodipine was increased to twice a day.  Does not feel that she needs to continue amlodipine twice a day.  Has hypothyroidism.  Supplemented by levothyroxine.  Wants to decrease her levothyroxine dose.  Has seizure disorder.  No recurrence.  Followed by urology.  On Lamictal.  No other complaints.  Doing well.    Review of Systems   A 12 point comprehensive review of systems was negative except as noted..     Medications, Allergies and Problem List   Reviewed and updated             Chief Complaint   Follow-up (Fasting for labs. Would like to discuss  reducing her blood pressure medication. )       Patient Profile   Social History     Social History Narrative        Past Medical History   Patient Active Problem List   Diagnosis     Meningioma     Peripheral Neuropathy     Hypothyroidism     Hyperlipidemia     Carpal Tunnel Syndrome     Essential Hypertension     Osteopenia     Seizure disorder     Osteoarthritis of right knee     GERD (gastroesophageal reflux disease)     Allergic rhinitis     Essential hypertension     Hyperlipidemia     Hypothyroid       Past Surgical History  She has no past surgical history on file.       Medications and Allergies   Current Outpatient Prescriptions   Medication Sig     amLODIPine (NORVASC) 5 MG tablet Take 1 tablet (5 mg total) by mouth daily. (Patient taking differently: Take 5 mg by mouth 2 (two) times a day. )     fluticasone (FLONASE) 50 mcg/actuation nasal spray USE 1 SPRAY IN EACH NOSTRIL EVERY DAY     lamoTRIgine (LAMICTAL) 100 MG tablet Take 1.5 tablets (150 mg total) by mouth 2 (two) times a day.     levothyroxine (SYNTHROID, LEVOTHROID) 88 MCG tablet Take 1 tablet (88 mcg total) by mouth Daily at 6:00 am.     meclizine (ANTIVERT) 25 mg tablet Take 1 tablet (25 mg total) by mouth 3 (three) times a day as needed for dizziness or nausea.     simvastatin (ZOCOR) 20 MG tablet TAKE ONE TABLET BY MOUTH EVERY EVENING     Allergies   Allergen Reactions     Codeine         Family and Social History   No family history on file.     Social History   Substance Use Topics     Smoking status: Never Smoker     Smokeless tobacco: Never Used     Alcohol use No        Physical Exam       Physical Exam  /60  Pulse 60  Resp 18  Ht 5' (1.524 m)  Wt 144 lb (65.3 kg)  Breastfeeding? No  BMI 28.12 kg/m2  General appearance: alert, appears stated age, cooperative and no distress  Head: Normocephalic, without obvious abnormality, atraumatic  Throat: lips, mucosa, and tongue normal; teeth and gums normal  Neck: no adenopathy, no  carotid bruit, no JVD, supple, symmetrical, trachea midline and thyroid not enlarged, symmetric, no tenderness/mass/nodules  Lungs: clear to auscultation bilaterally  Heart: regular rate and rhythm, S1, S2 normal, no murmur, click, rub or gallop  Abdomen: soft, non-tender; bowel sounds normal; no masses,  no organomegaly  Extremities: extremities normal, atraumatic, no cyanosis or edema  Skin: Skin color, texture, turgor normal. No rashes or lesions  Neurologic: Alert and oriented X 3, normal strength and tone. Normal symmetric reflexes. Normal coordination and gait     Additional Information        Lamont Kapadia MD  Internal Medicine  Contact me at [phone]     Additional Information   Current Outpatient Prescriptions   Medication Sig     amLODIPine (NORVASC) 5 MG tablet Take 1 tablet (5 mg total) by mouth daily. (Patient taking differently: Take 5 mg by mouth 2 (two) times a day. )     fluticasone (FLONASE) 50 mcg/actuation nasal spray USE 1 SPRAY IN EACH NOSTRIL EVERY DAY     lamoTRIgine (LAMICTAL) 100 MG tablet Take 1.5 tablets (150 mg total) by mouth 2 (two) times a day.     levothyroxine (SYNTHROID, LEVOTHROID) 88 MCG tablet Take 1 tablet (88 mcg total) by mouth Daily at 6:00 am.     meclizine (ANTIVERT) 25 mg tablet Take 1 tablet (25 mg total) by mouth 3 (three) times a day as needed for dizziness or nausea.     simvastatin (ZOCOR) 20 MG tablet TAKE ONE TABLET BY MOUTH EVERY EVENING     Allergies   Allergen Reactions     Codeine      Social History   Substance Use Topics     Smoking status: Never Smoker     Smokeless tobacco: Never Used     Alcohol use No         Time: total time spent with the patient was 25 minutes of which >50% was spent in counseling and coordination of care

## 2021-06-10 NOTE — TELEPHONE ENCOUNTER
Left message to call back for: lab results  Information to relay to patient:  Abnormal thyroid function, please make sure you take your levothyroxine first thing in the morning with water without food and your other medications. Have this repeated in 6 weeks.please make lab appointment only. Otherwise, rest of your labs are normal with other insignificant changes of no clinical importance. Continue same medications. Thanks.      Annemarie Pate LPN

## 2021-06-10 NOTE — TELEPHONE ENCOUNTER
This pt is coming in tomorrow morning for labs and there are no orders entered. Please advise. Thank you.

## 2021-06-10 NOTE — PROGRESS NOTES
Assessment and Plan:     1. Routine general medical examination at a health care facility  Advised her comprehensive physical exam is normal.     2. Essential hypertension  Controlled. Continue amlodipine.     3. Mixed hyperlipidemia  Controlled. Last lipids were good. Continue simvastatin.     4. Seizure disorder (H)  No recurrence or flare. Takes lamotrigine.     5. Acquired hypothyroidism  Supplemented by levothyroxine.     6. Visit for screening mammogram  Due for mammogram.   - Mammo Screening Bilateral; Future    7. Screen for colon cancer  Due for colonoscopy.     8. Moderate episode of recurrent major depressive disorder (H)  Has mild to moderate depression. Precipitated by her divorce from her . She is still unable to move on. Wants to see her clinical psychologist who saw her last year. Declines antidepressant at this time.   - Ambulatory referral to Psychiatry     The patient's current medical problems were reviewed.    I have had an Advance Directives discussion with the patient. Wants DNR?DNI  The following health maintenance schedule was reviewed with the patient and provided in printed form in the after visit summary:   Health Maintenance   Topic Date Due     ZOSTER VACCINES (2 of 3) 09/21/2017     MEDICARE ANNUAL WELLNESS VISIT  11/16/2018     DXA SCAN  11/16/2018     ADVANCE CARE PLANNING  02/11/2019     COLORECTAL CANCER SCREENING  08/03/2019     PNEUMOCOCCAL IMMUNIZATION 65+ LOW/MEDIUM RISK (2 of 2 - PPSV23) 12/12/2019     MAMMOGRAM  06/06/2020     FALL RISK ASSESSMENT  07/22/2020     INFLUENZA VACCINE RULE BASED (1) 08/01/2020     TD 18+ HE  02/11/2025 (Originally 7/3/2013)     LIPID  09/18/2024     HEPATITIS C SCREENING  Completed     HEPATITIS B VACCINES  Aged Out        Subjective:   Chief Complaint: Suzan Ballard is an 64 y.o. female here for an Annual Wellness visit.   HPI:  Annual wellness visit for Suzan. Just returned from the [facility] in late July. Had sore throat upon coming  back. Was positive for strep throat and negative for COVID. Was treated with antibiotic which resolved her strep throat. Still feels sad and lonely. Has major depression aggravated by her divorce from her  due to her 's infidelity. Has hypertension and hyperlipidemia controlled by her medications. Has hypothyroidism supplemented by levothyroxine. Has seizure disorder now well controlled by lamotrigine. Does not have recurrence or breakthrough.  Overall, feels well.     Review of Systems:  Aside from those mentioned in the HPI, the rest of the review of systems are negative for all systems.    Patient Care Team:  Lamont Kapadia MD as PCP - General  Lamont Kapadia MD as Assigned PCP     Patient Active Problem List   Diagnosis     Meningioma     Peripheral Neuropathy     Hypothyroidism     Hyperlipidemia     Carpal Tunnel Syndrome     Essential Hypertension     Osteopenia     Seizure disorder (H)     Osteoarthritis of right knee     GERD (gastroesophageal reflux disease)     Allergic rhinitis     Essential hypertension     Hyperlipidemia     Cooper's neuroma     Past Medical History:   Diagnosis Date     History of meningioma     causes seizures     Hyperlipidemia      Hypertension      Seizures (H)       No past surgical history on file.   No family history on file.   Social History     Socioeconomic History     Marital status:      Spouse name: Not on file     Number of children: Not on file     Years of education: Not on file     Highest education level: Not on file   Occupational History     Not on file   Social Needs     Financial resource strain: Not on file     Food insecurity     Worry: Not on file     Inability: Not on file     Transportation needs     Medical: Not on file     Non-medical: Not on file   Tobacco Use     Smoking status: Never Smoker     Smokeless tobacco: Never Used   Substance and Sexual Activity     Alcohol use: No     Drug use: No     Sexual activity: Not on  file   Lifestyle     Physical activity     Days per week: Not on file     Minutes per session: Not on file     Stress: Not on file   Relationships     Social connections     Talks on phone: Not on file     Gets together: Not on file     Attends [employer] service: Not on file     Active member of club or organization: Not on file     Attends meetings of clubs or organizations: Not on file     Relationship status: Not on file     Intimate partner violence     Fear of current or ex partner: Not on file     Emotionally abused: Not on file     Physically abused: Not on file     Forced sexual activity: Not on file   Other Topics Concern     Not on file   Social History Narrative     Not on file      Current Outpatient Medications   Medication Sig Dispense Refill     ALPRAZolam (XANAX) 0.25 MG tablet Take 1 tablet (0.25 mg total) by mouth 3 (three) times a day as needed for anxiety. 30 tablet 0     amLODIPine (NORVASC) 5 MG tablet Take 2 tablets (10 mg total) by mouth daily. 180 tablet 3     celecoxib (CELEBREX) 200 MG capsule Take 1 capsule (200 mg total) by mouth daily. 90 capsule 1     fluticasone propionate (FLONASE) 50 mcg/actuation nasal spray USE IN EACH NOSTRIL EVERY DAY 48 g 3     ibuprofen (ADVIL,MOTRIN) 800 MG tablet Take 1 tablet (800 mg total) by mouth every 8 (eight) hours as needed for pain. 90 tablet 3     lamoTRIgine (LAMICTAL) 100 MG tablet TAKE ONE AND ONE-HALF TABLETS TWICE DAILY 270 tablet 1     levothyroxine (SYNTHROID, LEVOTHROID) 88 MCG tablet Take 1 tablet (88 mcg total) by mouth Daily at 6:00 am. 180 tablet 3     meclizine (ANTIVERT) 25 mg tablet Take 1 tablet (25 mg total) by mouth 3 (three) times a day as needed for dizziness or nausea. 30 tablet 4     simvastatin (ZOCOR) 20 MG tablet Take 1 tablet (20 mg total) by mouth every evening. 90 tablet 3     No current facility-administered medications for this visit.       Objective:   Vital Signs:   Visit Vitals  /58   Pulse 75   Ht 5' (1.524  m)   Wt 126 lb (57.2 kg)   SpO2 98%   BMI 24.61 kg/m           VisionScreening:  No exam data present     PHYSICAL EXAM  General appearance: alert, appears stated age, cooperative and no distress  Head: Normocephalic, without obvious abnormality, atraumatic  Eyes: conjunctivae/corneas clear. PERRL, EOM's intact. Fundi benign.  Ears: normal TM's and external ear canals both ears  Nose: Nares normal. Septum midline. Mucosa normal. No drainage or sinus tenderness.  Throat: lips, mucosa, and tongue normal; teeth and gums normal  Neck: no adenopathy, no carotid bruit, no JVD, supple, symmetrical, trachea midline and thyroid not enlarged, symmetric, no tenderness/mass/nodules  Lungs: clear to auscultation bilaterally  Breasts: deferred  Heart: regular rate and rhythm, S1, S2 normal, no murmur, click, rub or gallop  Abdomen: soft, non-tender; bowel sounds normal; no masses,  no organomegaly  Pelvic: deferred  Extremities: extremities normal, atraumatic, no cyanosis or edema  Skin: Skin color, texture, turgor normal. No rashes or lesions  Neurologic: Grossly normal    Assessment Results 8/19/2020   Activities of Daily Living No help needed   Instrumental Activities of Daily Living No help needed   Get Up and Go Score Less than 12 seconds   Mini Cog Total Score 5   Some recent data might be hidden     A Mini-Cog score of 0-2 suggests the possibility of dementia, score of 3-5 suggests no dementia    Post Discharge Medication Reconciliation Status: discharge medications reconciled, continue medications without change    Identified Health Risks:     She is at risk for lack of exercise and has been provided with information to increase physical activity for the benefit of her well-being.  The patient reports that she does not have all recommended working emergency equipment available. She was provided with information about emergency preparedness, including smoke detectors.  The patient s PHQ-9 score is consistent with moderate  depression.  She was provided with information regarding depression and was advised to schedule a follow up appointment with her clinical therapist to further address this issue.  Patient's advanced directive was discussed and I am comfortable with the patient's wishes.

## 2021-06-11 NOTE — PROGRESS NOTES
Office Visit - Follow Up   Suzan Ballard   61 y.o. female    Date of Visit: 6/13/2017    Chief Complaint   Patient presents with     Knee Pain     Right 2 or 3 weeks, pt is not fasting        Assessment and Plan   1. Chronic pain of right knee  Has recurrence of her right knee pains for which she was seen by orthopedics a year ago on 6/29/2015.  Was assessed to be due to osteoarthritis and small degenerative tear of the medial meniscus.  Had an MRI which showed fraying of the posterior horn of the medial meniscus without a flap tear.  Had cortisone injection to the right knee which relieved her right knee pains.  Right knee pains recurred only 3 weeks ago.  Will refer her back to orthopedics and she may need another steroid injection to her right knee.  If this does not help she may need a definitive procedure like arthroscopic surgery.  Will give her tramadol as needed.  Unable to tolerate codeine.  - traMADol (ULTRAM) 50 mg tablet; Take 1 tablet (50 mg total) by mouth every 6 (six) hours as needed for pain.  Dispense: 30 tablet; Refill: 0  - Ambulatory referral to Orthopedics    2. Eustachian tube dysfunction, right  Had sudden onset of hearing noises in her right ear which persisted for 5 days and resolved today.  Informed this is due to eustachian tube dysfunction and described described the mechanism of this dysfunction causing noise in her right ear.    3. Essential hypertension  Has hypertension.  Controlled by amlodipine.  Continue amlodipine.    4. Seizure disorder  Has residual seizure disorder due to her meningioma which was excised twice.  Takes Lamictal.  No recurrence of seizures.  - lamoTRIgine (LAMICTAL) 100 MG tablet; TAKE ONE AND A HALF TABLETS BY MOUTH TWO TIMES A DAY  Dispense: 90 tablet; Refill: 3    5. Hyperlipidemia  Controlled by simvastatin.  Continue simvastatin.  - simvastatin (ZOCOR) 20 MG tablet; TAKE ONE TABLET BY MOUTH EVERY EVENING  Dispense: 90 tablet; Refill: 3    Follow up in in  July as scheduled.       History of Present Illness   This 61 y.o. old female complains of recurrence of right knee pains which started 3 weeks ago.  Had a same complaint a year ago for which she was seen by orthopedics for her right knee pains.  Was assessed to have both osteoarthritis and a small degenerative tear of the medial meniscus.  Was given cortisone shot to the right knee which did relieve her right knee pains.  Was pain-free for several months until 3 weeks ago she has recurrence of her right knee pains.  Had an MRI last year showing fraying of the posterior horn of the medial meniscus but without flap tear.  Was advised to see orthopedics again for repeat injection if her pains persisted.  Did well after injection until 3 weeks ago she had right knee pain recurrence.  Complains also of some noise in her right ear which persisted for 5 days but resolved today.  Did not have right ear pains and hearing loss.  Has hypertension and hyperlipidemia which are controlled by her medications.  Has seizure disorder but no recurrence.  Overall doing and feeling well.    Review of Systems   A 12 point comprehensive review of systems was negative except as noted..     Medications, Allergies and Problem List   Reviewed and updated             Chief Complaint   Knee Pain (Right 2 or 3 weeks, pt is not fasting)       Patient Profile   Social History     Social History Narrative        Past Medical History   Patient Active Problem List   Diagnosis     Meningioma     Peripheral Neuropathy     Hypothyroidism     Hyperlipidemia     Carpal Tunnel Syndrome     Essential Hypertension     Osteopenia     Seizure disorder     Osteoarthritis of right knee     GERD (gastroesophageal reflux disease)     Allergic rhinitis     Essential hypertension     Hyperlipidemia     Hypothyroid       Past Surgical History  She has no past surgical history on file.       Medications and Allergies   Current Outpatient Prescriptions   Medication  Sig     amLODIPine (NORVASC) 5 MG tablet Take 1 tablet (5 mg total) by mouth daily. (Patient taking differently: Take 5 mg by mouth 2 (two) times a day. )     fluticasone (FLONASE) 50 mcg/actuation nasal spray USE 1 SPRAY IN EACH NOSTRIL EVERY DAY     lamoTRIgine (LAMICTAL) 100 MG tablet TAKE ONE AND A HALF TABLETS BY MOUTH TWO TIMES A DAY     levothyroxine (SYNTHROID, LEVOTHROID) 88 MCG tablet Take 1 tablet (88 mcg total) by mouth Daily at 6:00 am.     meclizine (ANTIVERT) 25 mg tablet Take 1 tablet (25 mg total) by mouth 3 (three) times a day as needed for dizziness or nausea.     simvastatin (ZOCOR) 20 MG tablet TAKE ONE TABLET BY MOUTH EVERY EVENING     traMADol (ULTRAM) 50 mg tablet Take 1 tablet (50 mg total) by mouth every 6 (six) hours as needed for pain.     Allergies   Allergen Reactions     Codeine         Family and Social History   No family history on file.     Social History   Substance Use Topics     Smoking status: Never Smoker     Smokeless tobacco: Never Used     Alcohol use No        Physical Exam       Physical Exam  /64  Pulse 60  Wt 147 lb 12 oz (67 kg)  BMI 28.86 kg/m2  General appearance: alert, appears stated age, cooperative and no distress  Ears: normal TM's and external ear canals both ears and no wax, no discharge, no inflammation  Throat: lips, mucosa, and tongue normal; teeth and gums normal  Neck: no adenopathy, no carotid bruit, no JVD, supple, symmetrical, trachea midline and thyroid not enlarged, symmetric, no tenderness/mass/nodules  Lungs: clear to auscultation bilaterally  Heart: regular rate and rhythm, S1, S2 normal, no murmur, click, rub or gallop  Abdomen: soft, non-tender; bowel sounds normal; no masses,  no organomegaly  Extremities: extremities normal, atraumatic, no cyanosis or edema  Skin: Skin color, texture, turgor normal. No rashes or lesions  Musculoskeletal: Right knee tender to palpation and clicking sound with extension and flexion of the right knee.   Right knee is warm to touch.       Additional Information        Lamont Kapadia MD  Internal Medicine  Contact me at [phone]     Additional Information   Current Outpatient Prescriptions   Medication Sig     amLODIPine (NORVASC) 5 MG tablet Take 1 tablet (5 mg total) by mouth daily. (Patient taking differently: Take 5 mg by mouth 2 (two) times a day. )     fluticasone (FLONASE) 50 mcg/actuation nasal spray USE 1 SPRAY IN EACH NOSTRIL EVERY DAY     lamoTRIgine (LAMICTAL) 100 MG tablet TAKE ONE AND A HALF TABLETS BY MOUTH TWO TIMES A DAY     levothyroxine (SYNTHROID, LEVOTHROID) 88 MCG tablet Take 1 tablet (88 mcg total) by mouth Daily at 6:00 am.     meclizine (ANTIVERT) 25 mg tablet Take 1 tablet (25 mg total) by mouth 3 (three) times a day as needed for dizziness or nausea.     simvastatin (ZOCOR) 20 MG tablet TAKE ONE TABLET BY MOUTH EVERY EVENING     traMADol (ULTRAM) 50 mg tablet Take 1 tablet (50 mg total) by mouth every 6 (six) hours as needed for pain.     Allergies   Allergen Reactions     Codeine      Social History   Substance Use Topics     Smoking status: Never Smoker     Smokeless tobacco: Never Used     Alcohol use No         Time: total time spent with the patient was 25 minutes of which >50% was spent in counseling and coordination of care

## 2021-06-12 NOTE — PROGRESS NOTES
Office Visit - Follow Up   Suzan Ballard   61 y.o. female    Date of Visit: 7/27/2017    Chief Complaint   Patient presents with     Follow-up     3 months,pt is fasting, Right ear still ringing        Assessment and Plan   1. Essential hypertension  Controlled.  Continue amlodipine 5 mg daily.  Once this stop it.  advised  she  cannot stop her BP medication.  Check CBC and basic metabolic panel.  - HM2(CBC w/o Differential)  - Basic Metabolic Panel    2. Hyperlipidemia  Controlled.  Continue simvastatin.  Lipids on 4/13/2017 were good, LDL 74, HDL 61, triglyceride 101, and total cholesterol 155.  Check lipids and liver function.  - Lipid Cascade  - Hepatic Profile    3. Hypothyroid  Supplemented by levothyroxine.  Check TSH and free T4.  - Thyroid Stimulating Hormone (TSH)  - T4, Free    4. Seizure disorder  No recurrence or breakthrough.  Followed by neurology.  Takes Lamictal for seizure.    5. Meningioma  Status post meningioma excision.  Followed at [facility] neurosurgery.     6. Tinnitus, right  Complains of ringing in the right ear.  No decreased hearing.  Bothered by  this ringing especially at night.  Refer to ENT.  - Ambulatory referral to ENT    Follow up in 3 months.  Will see her before she travels to the [facility] in October where she will stay for 6 months.     History of Present Illness   This 61 y.o. old female here for follow-up.  Has hypertension.  Very well controlled by amlodipine.  Wants to stop her amlodipine but she cannot.  Has hyperlipidemia well controlled by simvastatin.  Has seizure disorder but no recurrence or breakthrough.  Started after she had a meningioma excision.  Followed by neurosurgery at the [facility].  Will see neurosurgery and neurology in August for her regular follow-up.  Complains this visit of tinnitus in the right ear.  Bothered by her tinnitus especially at night.  Overall doing and feeling well.    Review of Systems   A 12 point  comprehensive review of systems was negative except as noted..     Medications, Allergies and Problem List   Reviewed and updated             Chief Complaint   Follow-up (3 months,pt is fasting, Right ear still ringing)       Patient Profile   Social History     Social History Narrative        Past Medical History   Patient Active Problem List   Diagnosis     Meningioma     Peripheral Neuropathy     Hypothyroidism     Hyperlipidemia     Carpal Tunnel Syndrome     Essential Hypertension     Osteopenia     Seizure disorder     Osteoarthritis of right knee     GERD (gastroesophageal reflux disease)     Allergic rhinitis     Essential hypertension     Hyperlipidemia     Hypothyroid       Past Surgical History  She has no past surgical history on file.       Medications and Allergies   Current Outpatient Prescriptions   Medication Sig     amLODIPine (NORVASC) 5 MG tablet Take 1 tablet (5 mg total) by mouth daily. (Patient taking differently: Take 5 mg by mouth 2 (two) times a day. )     fluticasone (FLONASE) 50 mcg/actuation nasal spray SHAKE LIQUID AND USE 1 SPRAY IN EACH NOSTRIL EVERY DAY     lamoTRIgine (LAMICTAL) 100 MG tablet TAKE 1 AND 1/2 TABLETS BY MOUTH TWICE DAILY     levothyroxine (SYNTHROID, LEVOTHROID) 88 MCG tablet Take 1 tablet (88 mcg total) by mouth Daily at 6:00 am.     meclizine (ANTIVERT) 25 mg tablet Take 1 tablet (25 mg total) by mouth 3 (three) times a day as needed for dizziness or nausea.     simvastatin (ZOCOR) 20 MG tablet TAKE ONE TABLET BY MOUTH EVERY EVENING     Allergies   Allergen Reactions     Codeine         Family and Social History   No family history on file.     Social History   Substance Use Topics     Smoking status: Never Smoker     Smokeless tobacco: Never Used     Alcohol use No        Physical Exam       Physical Exam  /70  Pulse 60  Wt 145 lb (65.8 kg)  BMI 28.32 kg/m2  General appearance: alert, appears stated age, cooperative and no distress  Head: Normocephalic,  without obvious abnormality, atraumatic  Throat: lips, mucosa, and tongue normal; teeth and gums normal  Neck: no adenopathy, no carotid bruit, no JVD, supple, symmetrical, trachea midline and thyroid not enlarged, symmetric, no tenderness/mass/nodules  Lungs: clear to auscultation bilaterally  Heart: regular rate and rhythm, S1, S2 normal, no murmur, click, rub or gallop  Abdomen: soft, non-tender; bowel sounds normal; no masses,  no organomegaly  Extremities: extremities normal, atraumatic, no cyanosis or edema  Skin: Skin color, texture, turgor normal. No rashes or lesions  Neurologic: Grossly normal     Additional Information        Lamont Kapadia MD  Internal Medicine  Contact me at [phone]     Additional Information   Current Outpatient Prescriptions   Medication Sig     amLODIPine (NORVASC) 5 MG tablet Take 1 tablet (5 mg total) by mouth daily. (Patient taking differently: Take 5 mg by mouth 2 (two) times a day. )     fluticasone (FLONASE) 50 mcg/actuation nasal spray SHAKE LIQUID AND USE 1 SPRAY IN EACH NOSTRIL EVERY DAY     lamoTRIgine (LAMICTAL) 100 MG tablet TAKE 1 AND 1/2 TABLETS BY MOUTH TWICE DAILY     levothyroxine (SYNTHROID, LEVOTHROID) 88 MCG tablet Take 1 tablet (88 mcg total) by mouth Daily at 6:00 am.     meclizine (ANTIVERT) 25 mg tablet Take 1 tablet (25 mg total) by mouth 3 (three) times a day as needed for dizziness or nausea.     simvastatin (ZOCOR) 20 MG tablet TAKE ONE TABLET BY MOUTH EVERY EVENING     Allergies   Allergen Reactions     Codeine      Social History   Substance Use Topics     Smoking status: Never Smoker     Smokeless tobacco: Never Used     Alcohol use No         Time: total time spent with the patient was 25 minutes of which >50% was spent in counseling and coordination of care

## 2021-06-13 NOTE — PROGRESS NOTES
Office Visit - Follow Up   Suzan Ballard   61 y.o. female    Date of Visit: 10/26/2017    Chief Complaint   Patient presents with     Follow-up     fasting, wants flu shot        Assessment and Plan   1. Essential hypertension  Controlled.  Continue amlodipine.  Check CBC and basic metabolic panel.  - HM2(CBC w/o Differential)  - Basic Metabolic Panel    2. Hyperlipidemia  Controlled.  Lipids on 7/27/2017 were good.  LDL 95, HDL 69, triglycerides 126 and total cholesterol 189.  Continue simvastatin.  Check lipids and liver function.  - Lipid Cascade  - Hepatic Profile    3. Seizure disorder  Followed by neurology.  Takes Lamictal.  Will see Dr. Perez this week at the [facility] neurology since Dr. Perez moved to teach at the [facility].    4. Hypothyroidism  Supplemented by levothyroxine.  Check TSH.  Thyroid function in July 2017.  Was normal   - Thyroid Stimulating Hormone (TSH)    5. Allergic rhinitis  Has allergic rhinitis.  Comes and goes.  Usually around spring.  Takes fluticasone nasal spray as needed.    6. Need for immunization against influenza  Flu shot is given.  - Influenza, Seasonal Quad, Preservative Free 36+ Months    Just came back from the [facility] a week ago.  Still tired.        Follow up in 3 months.     History of Present Illness   This 61 y.o. old female here for follow-up.  Doing well.  Still tired because he just came from the [facility].  Still has jet lag.  Has hypertension controlled by amlodipine.  Has hyperlipidemia controlled by simvastatin.  Has hypothyroidism well supplemented by levothyroxine.  Has seizure disorder.  No recurrence.  Followed by neurology.  Takes Lamictal.  No complaints.    Review of Systems   A 12 point comprehensive review of systems was negative except as noted..     Medications, Allergies and Problem List   Reviewed and updated             Chief Complaint   Follow-up (fasting, wants flu shot)       Patient Profile    Social History     Social History Narrative        Past Medical History   Patient Active Problem List   Diagnosis     Meningioma     Peripheral Neuropathy     Hypothyroidism     Hyperlipidemia     Carpal Tunnel Syndrome     Essential Hypertension     Osteopenia     Seizure disorder     Osteoarthritis of right knee     GERD (gastroesophageal reflux disease)     Allergic rhinitis     Essential hypertension     Hyperlipidemia     Hypothyroid       Past Surgical History  She has no past surgical history on file.       Medications and Allergies   Current Outpatient Prescriptions   Medication Sig     amLODIPine (NORVASC) 5 MG tablet TAKE 1 TABLET BY MOUTH DAILY     fluticasone (FLONASE) 50 mcg/actuation nasal spray SHAKE LIQUID AND USE 1 SPRAY IN EACH NOSTRIL EVERY DAY     lamoTRIgine (LAMICTAL) 100 MG tablet TAKE 1 AND 1/2 TABLETS BY MOUTH TWICE DAILY     levothyroxine (SYNTHROID, LEVOTHROID) 88 MCG tablet Take 1 tablet (88 mcg total) by mouth Daily at 6:00 am.     meclizine (ANTIVERT) 25 mg tablet Take 1 tablet (25 mg total) by mouth 3 (three) times a day as needed for dizziness or nausea.     potassium chloride SA (K-DUR,KLOR-CON) 10 MEQ tablet TAKE ONE TABLET EVERY OTHER DAY     simvastatin (ZOCOR) 20 MG tablet TAKE ONE TABLET BY MOUTH EVERY EVENING     Allergies   Allergen Reactions     Codeine         Family and Social History   No family history on file.     Social History   Substance Use Topics     Smoking status: Never Smoker     Smokeless tobacco: Never Used     Alcohol use No        Physical Exam       Physical Exam  /80 (Patient Site: Left Arm, Patient Position: Sitting, Cuff Size: Adult Regular)  Pulse 65  Ht 5' (1.524 m)  Wt 144 lb (65.3 kg)  SpO2 97%  BMI 28.12 kg/m2  General appearance: alert, appears stated age, cooperative and no distress  Head: Normocephalic, without obvious abnormality, atraumatic  Throat: lips, mucosa, and tongue normal; teeth and gums normal  Neck: no adenopathy, no  carotid bruit, no JVD, supple, symmetrical, trachea midline and thyroid not enlarged, symmetric, no tenderness/mass/nodules  Lungs: clear to auscultation bilaterally  Heart: regular rate and rhythm, S1, S2 normal, no murmur, click, rub or gallop  Abdomen: soft, non-tender; bowel sounds normal; no masses,  no organomegaly  Extremities: extremities normal, atraumatic, no cyanosis or edema  Skin: Skin color, texture, turgor normal. No rashes or lesions  Neurologic: Grossly normal     Additional Information        Lamont Kapadia MD  Internal Medicine  Contact me at [phone]     Additional Information   Current Outpatient Prescriptions   Medication Sig     amLODIPine (NORVASC) 5 MG tablet TAKE 1 TABLET BY MOUTH DAILY     fluticasone (FLONASE) 50 mcg/actuation nasal spray SHAKE LIQUID AND USE 1 SPRAY IN EACH NOSTRIL EVERY DAY     lamoTRIgine (LAMICTAL) 100 MG tablet TAKE 1 AND 1/2 TABLETS BY MOUTH TWICE DAILY     levothyroxine (SYNTHROID, LEVOTHROID) 88 MCG tablet Take 1 tablet (88 mcg total) by mouth Daily at 6:00 am.     meclizine (ANTIVERT) 25 mg tablet Take 1 tablet (25 mg total) by mouth 3 (three) times a day as needed for dizziness or nausea.     potassium chloride SA (K-DUR,KLOR-CON) 10 MEQ tablet TAKE ONE TABLET EVERY OTHER DAY     simvastatin (ZOCOR) 20 MG tablet TAKE ONE TABLET BY MOUTH EVERY EVENING     Allergies   Allergen Reactions     Codeine      Social History   Substance Use Topics     Smoking status: Never Smoker     Smokeless tobacco: Never Used     Alcohol use No         Time: total time spent with the patient was 25 minutes of which >50% was spent in counseling and coordination of care

## 2021-06-13 NOTE — PROGRESS NOTES
Office Visit - Follow Up   Suzan Ballard   65 y.o. female    Date of Visit: 11/18/2020    Chief Complaint   Patient presents with     Follow-up     fasting      Flu Vaccine        Assessment and Plan   1. Essential hypertension  Controlled. Continue amlodipine.  - HM2(CBC w/o Differential)  - Basic Metabolic Panel    2. Mixed hyperlipidemia  Controlled. Last lipids were good. Continue simvastatin.   - Lipid Cascade  - Hepatic Profile    3. Meningioma  S/p excision. Followed by neurology. Her neurologist, Dr Perez is retired. I recommended to see Dr Escoto who is Dr Perez's partner.     4. Seizure disorder (H)  No breakthrough and recurrence. Continue Lamictal.     5. Acquired hypothyroidism  Supplemented by levothyroxine.   - Thyroid Stimulating Hormone (TSH)  - T4, Free    6. Osteopenia  Continue calcium and vitamin D.   - Vitamin D, Total (25-Hydroxy)    7. Screen for colon cancer  Due for colon cancer screening. Last colonoscopy was in 2019. Will do cologuard.   - Cologuard    8. Need for immunization against influenza  Flu shot was given. Pneumovax was also given.   - Influenza,Quad,High Dose,PF 65 YR+      Follow up in 4 months.      History of Present Illness   This 65 y.o. old female is here for follow up. Has several medical problems but all of these are either controlled or stable. Has hypertension and hyperlipidemia controlled by her meds. Has seizure disorder without breakthrough or recurrence with Lamictal. Had meningioma s/p excision without recurrence. Followed by neurology for both. Reports her neurologist is retired. Will see another neurologist at the [facility]. Has hypothyroidism supplemented by levothyroxine.  Has osteopenia supplemented by calcium and vitamin D. Overall, feels well. No complaints.      Review of Systems   A 12 point comprehensive review of systems was negative except as noted..     Medications, Allergies and Problem List   Reviewed and updated             Chief Complaint    Follow-up (fasting ) and Flu Vaccine       Patient Profile   Social History     Social History Narrative     Not on file        Past Medical History   Patient Active Problem List   Diagnosis     Meningioma     Peripheral Neuropathy     Hypothyroidism     Carpal Tunnel Syndrome     Essential Hypertension     Osteopenia     Seizure disorder (H)     Osteoarthritis of right knee     GERD (gastroesophageal reflux disease)     Allergic rhinitis     Essential hypertension     Mixed hyperlipidemia     Cooper's neuroma       Past Surgical History  She has no past surgical history on file.       Medications and Allergies   Current Outpatient Medications   Medication Sig     ALPRAZolam (XANAX) 0.25 MG tablet Take 1 tablet (0.25 mg total) by mouth 3 (three) times a day as needed for anxiety.     amLODIPine (NORVASC) 5 MG tablet Take 2 tablets (10 mg total) by mouth daily.     celecoxib (CELEBREX) 200 MG capsule Take 1 capsule (200 mg total) by mouth daily.     fluticasone propionate (FLONASE) 50 mcg/actuation nasal spray USE IN EACH NOSTRIL EVERY DAY     lamoTRIgine (LAMICTAL) 100 MG tablet TAKE ONE AND ONE-HALF TABLETS TWICE DAILY     levothyroxine (SYNTHROID, LEVOTHROID) 88 MCG tablet Take 1 tablet (88 mcg total) by mouth Daily at 6:00 am.     meclizine (ANTIVERT) 25 mg tablet Take 1 tablet (25 mg total) by mouth 3 (three) times a day as needed for dizziness or nausea.     simvastatin (ZOCOR) 20 MG tablet Take 1 tablet (20 mg total) by mouth every evening.     Allergies   Allergen Reactions     Codeine Nausea And Vomiting        Family and Social History   No family history on file.     Social History     Tobacco Use     Smoking status: Never Smoker     Smokeless tobacco: Never Used   Substance Use Topics     Alcohol use: No     Drug use: No        Physical Exam       Physical Exam  /62   Pulse (!) 59   Wt 128 lb (58.1 kg)   SpO2 98%   BMI 25.00 kg/m    General appearance: alert, appears stated age, cooperative  and no distress  Head: Normocephalic, without obvious abnormality, atraumatic  Eyes: conjunctivae/corneas clear. PERRL, EOM's intact. Fundi benign.  Throat: lips, mucosa, and tongue normal; teeth and gums normal  Neck: no adenopathy, no carotid bruit, no JVD, supple, symmetrical, trachea midline and thyroid not enlarged, symmetric, no tenderness/mass/nodules  Lungs: clear to auscultation bilaterally  Heart: regular rate and rhythm, S1, S2 normal, no murmur, click, rub or gallop  Abdomen: soft, non-tender; bowel sounds normal; no masses,  no organomegaly  Extremities: extremities normal, atraumatic, no cyanosis or edema  Skin: Skin color, texture, turgor normal. No rashes or lesions  Neurologic: Grossly normal     Additional Information   Post Discharge Medication Reconciliation Status: discharge medications reconciled, continue medications without change      Lamont Kapadia MD  Internal Medicine  Contact me at [phone]     Additional Information   Current Outpatient Medications   Medication Sig     ALPRAZolam (XANAX) 0.25 MG tablet Take 1 tablet (0.25 mg total) by mouth 3 (three) times a day as needed for anxiety.     amLODIPine (NORVASC) 5 MG tablet Take 2 tablets (10 mg total) by mouth daily.     celecoxib (CELEBREX) 200 MG capsule Take 1 capsule (200 mg total) by mouth daily.     fluticasone propionate (FLONASE) 50 mcg/actuation nasal spray USE IN EACH NOSTRIL EVERY DAY     lamoTRIgine (LAMICTAL) 100 MG tablet TAKE ONE AND ONE-HALF TABLETS TWICE DAILY     levothyroxine (SYNTHROID, LEVOTHROID) 88 MCG tablet Take 1 tablet (88 mcg total) by mouth Daily at 6:00 am.     meclizine (ANTIVERT) 25 mg tablet Take 1 tablet (25 mg total) by mouth 3 (three) times a day as needed for dizziness or nausea.     simvastatin (ZOCOR) 20 MG tablet Take 1 tablet (20 mg total) by mouth every evening.     Allergies   Allergen Reactions     Codeine Nausea And Vomiting     Social History     Tobacco Use     Smoking status: Never  Smoker     Smokeless tobacco: Never Used   Substance Use Topics     Alcohol use: No     Drug use: No         Time: total time spent with the patient was 25 minutes of which >50% was spent in counseling and coordination of care

## 2021-06-15 NOTE — PROGRESS NOTES
Office Visit - Follow Up   Suzan Ballard   62 y.o. female    Date of Visit: 1/30/2018    Chief Complaint   Patient presents with     Follow-up     fasting, denies concerns        Assessment and Plan   1. Essential hypertension  Controlled.  Continue amlodipine 5 mg daily.  Complains of intermittent dizziness.  Not due to low blood pressure.  Does not have low blood pressure readings.  Advised to continue amlodipine.  Check CBC and basic metabolic panel..  - HM2(CBC w/o Differential)  - Basic Metabolic Panel    2. Hyperlipidemia  Controlled.  Continue simvastatin.  Lipids on 10/26/1970 were good LDL 95, HDL 62, triglycerides 98 and total cholesterol 177.  Check lipids and liver function.  - Lipid Cascade  - Hepatic Profile    3. Hypothyroidism  Supplemented by levothyroxine.  Normal TSH at 0.92 on 10/26/2017.  Continue same dose of levothyroxine.  Check TSH and free T4.  - Thyroid Stimulating Hormone (TSH)  - T4, Free    4. Seizure disorder  No breakthrough or recurrence.  Takes lamotrigine.  Check lamotrigine level.  - Lamotrigine (Lamictal  )    5. Meningioma  Followed by neurosurgery at the [facility].  Underwent resection in 1995 repeated in 2012 and 2013.  MRI in November 2017 done at the [facility] showed mild interval enlargement of the anterior and posterior falcine meningioma.  Advised by neurosurgery, Dr. Sauer to have a repeat MRI in November 2018 a year later.  Will see neurosurgery at the [facility] after her MRI in November.  Plan is watchful waiting agreed both by the patient and neurosurgery.    6. Allergic rhinitis  Has allergic rhinitis.  Uses Flonase nasal spray.  Needs refill of Flonase.    Was seen by Dr. Torrez of gynecology for her female preventative exam.  Had a Pap smear on 10/30/2017 and showed ASCUS.  Had repeat Pap smear on 11/9/2017 and this was normal.  Will obtain her repeat Pap smear result.    Reviewed neurosurgery note and recommendation by  Dr. Sauer and MRI result.  Reviewed gynecology note by Dr. Torrez on her initial female preventative exam.  All these were discussed with the patient.      Follow up in 4 months.     History of Present Illness   This 62 y.o. old female is here for follow-up.  Doing well.  Complains only of intermittent dizziness.  Dizziness comes and goes.  Takes meclizine and it helps.  Has hypertension.  Controlled by amlodipine 5 mg daily.  Does not have low blood pressure readings now.  Has some meningioma status post excision 3 times, 1995, 2012 and 2013 now followed by the [facility] neurosurgery.  Was seen in November.  Had repeat MRI which showed mild interval enlargement of the residual meningioma.  Was advised to have repeat MRI on 1118.  Plan of watchful waiting was agreed by both the patient and her neurosurgeon.  Has hyperlipidemia controlled by simvastatin.  Was seen by gynecology for her female preventative exam.  Had Pap smear which showed ASCUS.  And had a repeat Pap smear which was apparently normal.  Will obtain her Pap smear results.  Has seizure disorder.  Takes Lamictal.  No breakthrough or recurrence.  Has allergic rhinitis.  Uses Flonase which works.  Overall feels well.    Review of Systems   A 12 point comprehensive review of systems was negative except as noted..     Medications, Allergies and Problem List   Reviewed and updated             Chief Complaint   Follow-up (fasting, denies concerns)       Patient Profile   Social History     Social History Narrative        Past Medical History   Patient Active Problem List   Diagnosis     Meningioma     Peripheral Neuropathy     Hypothyroidism     Hyperlipidemia     Carpal Tunnel Syndrome     Essential Hypertension     Osteopenia     Seizure disorder     Osteoarthritis of right knee     GERD (gastroesophageal reflux disease)     Allergic rhinitis     Essential hypertension     Hyperlipidemia     Hypothyroid       Past Surgical History  She has no  past surgical history on file.       Medications and Allergies   Current Outpatient Prescriptions   Medication Sig     amLODIPine (NORVASC) 5 MG tablet TAKE 1 TABLET BY MOUTH DAILY     lamoTRIgine (LAMICTAL) 100 MG tablet TAKE 1 AND 1/2 TABLETS BY MOUTH TWICE DAILY     levothyroxine (SYNTHROID, LEVOTHROID) 88 MCG tablet Take 1 tablet (88 mcg total) by mouth Daily at 6:00 am.     meclizine (ANTIVERT) 25 mg tablet Take 1 tablet (25 mg total) by mouth 3 (three) times a day as needed for dizziness or nausea.     potassium chloride SA (K-DUR,KLOR-CON) 10 MEQ tablet TAKE ONE TABLET EVERY OTHER DAY     simvastatin (ZOCOR) 20 MG tablet TAKE ONE TABLET BY MOUTH EVERY EVENING     fluticasone (FLONASE) 50 mcg/actuation nasal spray USE 1 SPRAY IN EACH NOSTRIL EVERY DAY; SHAKE BEFORE USE     Allergies   Allergen Reactions     Codeine         Family and Social History   No family history on file.     Social History   Substance Use Topics     Smoking status: Never Smoker     Smokeless tobacco: Never Used     Alcohol use No        Physical Exam       Physical Exam  /80  Pulse (!) 59  Ht 5' (1.524 m)  Wt 146 lb (66.2 kg)  SpO2 98%  BMI 28.51 kg/m2  General appearance: alert, appears stated age, cooperative and no distress  Head: Normocephalic, without obvious abnormality, atraumatic  Throat: lips, mucosa, and tongue normal; teeth and gums normal  Neck: no adenopathy, no carotid bruit, no JVD, supple, symmetrical, trachea midline and thyroid not enlarged, symmetric, no tenderness/mass/nodules  Lungs: clear to auscultation bilaterally  Heart: regular rate and rhythm, S1, S2 normal, no murmur, click, rub or gallop  Abdomen: soft, non-tender; bowel sounds normal; no masses,  no organomegaly  Extremities: extremities normal, atraumatic, no cyanosis or edema  Skin: Skin color, texture, turgor normal. No rashes or lesions  Neurologic: Grossly normal     Additional Information        Lamont Kapadia MD  Internal  Medicine  Contact me at [phone]     Additional Information   Current Outpatient Prescriptions   Medication Sig     amLODIPine (NORVASC) 5 MG tablet TAKE 1 TABLET BY MOUTH DAILY     lamoTRIgine (LAMICTAL) 100 MG tablet TAKE 1 AND 1/2 TABLETS BY MOUTH TWICE DAILY     levothyroxine (SYNTHROID, LEVOTHROID) 88 MCG tablet Take 1 tablet (88 mcg total) by mouth Daily at 6:00 am.     meclizine (ANTIVERT) 25 mg tablet Take 1 tablet (25 mg total) by mouth 3 (three) times a day as needed for dizziness or nausea.     potassium chloride SA (K-DUR,KLOR-CON) 10 MEQ tablet TAKE ONE TABLET EVERY OTHER DAY     simvastatin (ZOCOR) 20 MG tablet TAKE ONE TABLET BY MOUTH EVERY EVENING     fluticasone (FLONASE) 50 mcg/actuation nasal spray USE 1 SPRAY IN EACH NOSTRIL EVERY DAY; SHAKE BEFORE USE     Allergies   Allergen Reactions     Codeine      Social History   Substance Use Topics     Smoking status: Never Smoker     Smokeless tobacco: Never Used     Alcohol use No         Time: total time spent with the patient was 40 minutes of which >50% was spent in counseling and coordination of care

## 2021-06-16 NOTE — PROGRESS NOTES
Office Visit - Follow Up   Suzan Ballard   65 y.o. female    Date of Visit: 3/23/2021    Chief Complaint   Patient presents with     Follow-up     6 month HTN, has a form for blood pressure monitor to be filled out        Assessment and Plan   1. Essential hypertension  Controlled. Needs her form completed to obtain a blood pressure monitor to be paid by her insurance.  Her blood pressure is controlled by amlodipine alone.     2. Mixed hyperlipidemia  Controlled. Last lipids were controlled. Continue simvastatin.     3. Acquired hypothyroidism  Supplemented by levothyroxine. Continue same dose of levothyroxine.     4. Meningioma  Stable. No recurrence.     5. Seizure disorder (H)  Controlled. No breakthrough. Followed by neurology. But her neurologist retired.     Has depression which is  controlled. PHQ 9 score is only 2. But she is under stress since her estranged  who stays in the [facility] for most of the year came home and he is  staying with her. Wants to see her clinical therapist again to sort out her inner turmoil. Saw [facility]. Will make her own appointment.     Form to obtain a blood pressure monitor was filled up and completed by me.       Follow up in 4 months.      History of Present Illness   This 65 y.o. old female is here for follow up. Has hypertension controlled by amlodipine alone. Has hyperlipidemia controlled by simvastatin. Has hypothyroidism supplemented by levothyroxine. Has meningioma and seizure which are stable and controlled. There are no recurrences.      Review of Systems   A 12 point comprehensive review of systems was negative except as noted..     Medications, Allergies and Problem List   Reviewed and updated             Chief Complaint   Follow-up (6 month HTN, has a form for blood pressure monitor to be filled out)       Patient Profile   Social History     Social History Narrative     Not on file        Past Medical History   Patient  Active Problem List   Diagnosis     Meningioma     Peripheral Neuropathy     Hypothyroidism     Carpal Tunnel Syndrome     Essential Hypertension     Osteopenia     Seizure disorder (H)     Osteoarthritis of right knee     GERD (gastroesophageal reflux disease)     Allergic rhinitis     Essential hypertension     Mixed hyperlipidemia     Cooper's neuroma       Past Surgical History  She has no past surgical history on file.       Medications and Allergies   Current Outpatient Medications   Medication Sig     ALPRAZolam (XANAX) 0.25 MG tablet Take 1 tablet (0.25 mg total) by mouth 3 (three) times a day as needed for anxiety.     amLODIPine (NORVASC) 5 MG tablet Take 2 tablets (10 mg total) by mouth daily.     celecoxib (CELEBREX) 200 MG capsule Take 1 capsule (200 mg total) by mouth daily.     fluticasone propionate (FLONASE) 50 mcg/actuation nasal spray USE IN EACH NOSTRIL EVERY DAY     lamoTRIgine (LAMICTAL) 100 MG tablet TAKE ONE AND ONE-HALF TABLETS TWICE DAILY     levothyroxine (SYNTHROID, LEVOTHROID) 88 MCG tablet Take 1 tablet (88 mcg total) by mouth Daily at 6:00 am.     meclizine (ANTIVERT) 25 mg tablet Take 1 tablet (25 mg total) by mouth 3 (three) times a day as needed for dizziness or nausea.     simvastatin (ZOCOR) 20 MG tablet Take 1 tablet (20 mg total) by mouth every evening.     Allergies   Allergen Reactions     Codeine Nausea And Vomiting        Family and Social History   No family history on file.     Social History     Tobacco Use     Smoking status: Never Smoker     Smokeless tobacco: Never Used   Substance Use Topics     Alcohol use: No     Drug use: No        Physical Exam       Physical Exam  /72   Pulse 63   Ht 5' (1.524 m)   Wt 129 lb (58.5 kg)   SpO2 97%   BMI 25.19 kg/m    General appearance: alert, appears stated age, cooperative and no distress  Throat: lips, mucosa, and tongue normal; teeth and gums normal  Neck: no adenopathy, no carotid bruit, no JVD, supple, symmetrical,  trachea midline and thyroid not enlarged, symmetric, no tenderness/mass/nodules  Lungs: clear to auscultation bilaterally  Heart: regular rate and rhythm, S1, S2 normal, no murmur, click, rub or gallop  Abdomen: soft, non-tender; bowel sounds normal; no masses,  no organomegaly  Extremities: extremities normal, atraumatic, no cyanosis or edema  Skin: Skin color, texture, turgor normal. No rashes or lesions  Neurologic: Grossly normal     Additional Information   Post Discharge Medication Reconciliation Status: discharge medications reconciled, continue medications without change      Lamont Kapadia MD  Internal Medicine  Contact me at [phone]     Additional Information   Current Outpatient Medications   Medication Sig     ALPRAZolam (XANAX) 0.25 MG tablet Take 1 tablet (0.25 mg total) by mouth 3 (three) times a day as needed for anxiety.     amLODIPine (NORVASC) 5 MG tablet Take 2 tablets (10 mg total) by mouth daily.     celecoxib (CELEBREX) 200 MG capsule Take 1 capsule (200 mg total) by mouth daily.     fluticasone propionate (FLONASE) 50 mcg/actuation nasal spray USE IN EACH NOSTRIL EVERY DAY     lamoTRIgine (LAMICTAL) 100 MG tablet TAKE ONE AND ONE-HALF TABLETS TWICE DAILY     levothyroxine (SYNTHROID, LEVOTHROID) 88 MCG tablet Take 1 tablet (88 mcg total) by mouth Daily at 6:00 am.     meclizine (ANTIVERT) 25 mg tablet Take 1 tablet (25 mg total) by mouth 3 (three) times a day as needed for dizziness or nausea.     simvastatin (ZOCOR) 20 MG tablet Take 1 tablet (20 mg total) by mouth every evening.     Allergies   Allergen Reactions     Codeine Nausea And Vomiting     Social History     Tobacco Use     Smoking status: Never Smoker     Smokeless tobacco: Never Used   Substance Use Topics     Alcohol use: No     Drug use: No

## 2021-06-17 NOTE — TELEPHONE ENCOUNTER
RN cannot approve Refill Request    RN can NOT refill this medication med is not covered by policy/route to provider. Last office visit: 3/23/2021 Lamont Kapadia MD Last Physical: 8/19/2020 Last MTM visit: Visit date not found Last visit same specialty: 3/23/2021 Lamont Kapadia MD.  Next visit within 3 mo: Visit date not found  Next physical within 3 mo: Visit date not found      Deloris Rivas, [facility] Triage/Med Refill 5/2/2021    Requested Prescriptions   Pending Prescriptions Disp Refills     celecoxib (CELEBREX) 200 MG capsule [Pharmacy Med Name: CELECOXIB 200MG CAPSULES] 90 capsule 1     Sig: TAKE 1 CAPSULE(200 MG) BY MOUTH DAILY       There is no refill protocol information for this order

## 2021-06-17 NOTE — PATIENT INSTRUCTIONS - HE
"Patient Instructions by Jai Morris DO at 3/13/2019  8:20 AM     Author: Jai Morris DO Service: -- Author Type: Physician    Filed: 3/13/2019  8:45 AM Encounter Date: 3/13/2019 Status: Signed    : Jai Morris DO (Physician)         SLEEP HYGIENE    Sleep only as much as you need to feel rested and then get out of bed   Keep a regular sleep schedule   Avoid forcing sleep   Exercise regularly for at least 20 minutes, preferably 4 to 5 hours before bedtime   Avoid caffeinated beverages after lunch   Avoid alcohol near bedtime: no \"night cap\"   Avoid smoking, especially in the evening   Do not go to bed hungry   Adjust bedroom environment   Avoid prolonged use of light-emitting screens before bedtime    Deal with your worries before bedtime              "

## 2021-06-18 NOTE — PATIENT INSTRUCTIONS - HE
Patient Instructions by Lamont Kapadia MD at 8/19/2020  2:40 PM     Author: Lamont Kapadia MD Service: -- Author Type: Physician    Filed: 8/19/2020  3:02 PM Encounter Date: 8/19/2020 Status: Signed    : Lamont Kapadia MD (Physician)         Patient Education     Exercise for a Healthier Heart  You may wonder how you can improve the health of your heart. If youre thinking about exercise, youre on the right track. You dont need to become an athlete, but you do need a certain amount of brisk exercise to help strengthen your heart. If you have been diagnosed with a heart condition, your doctor may recommend exercise to help stabilize your condition. To help make exercise a habit, choose safe, fun activities.       Be sure to check with your health care provider before starting an exercise program.    Why exercise?  Exercising regularly offers many healthy rewards. It can help you do all of the following:    Improve your blood cholesterol levels to help prevent further heart trouble    Lower your blood pressure to help prevent a stroke or heart attack    Control diabetes, or reduce your risk of getting this disease    Improve your heart and lung function    Reach and maintain a healthy weight    Make your muscles stronger and more limber so you can stay active    Prevent falls and fractures by slowing the loss of bone mass (osteoporosis)    Manage stress better  Exercise tips  Ease into your routine. Set small goals. Then build on them.  Exercise on most days. Aim for a total of 150 or more minutes of moderate to  vigorous intensity activity each week. Consider 40 minutes, 3 to 4 times a week. For best results, activity should last for 40 minutes on average. It is OK to work up to the 40 minute period over time. Examples of moderate-intensity activity is walking one mile in 15 minutes or 30 to 45 minutes of yard work.  Step up your daily activity level. Along with your exercise program, try being  more active throughout the day. Walk instead of drive. Do more household tasks or yard work.  Choose one or more activities you enjoy. Walking is one of the easiest things you can do. You can also try swimming, riding a bike, or taking an exercise class.  Stop exercising and call your doctor if you:    Have chest pain or feel dizzy or lightheaded    Feel burning, tightness, pressure, or heaviness in your chest, neck, shoulders, back, or arms    Have unusual shortness of breath    Have increased joint or muscle pain    Have palpitations or an irregular heartbeat      5716-1843 [employer]. [address] [place] [address]. All rights reserved. This information is not intended as a substitute for professional medical care. Always follow your healthcare professional's instructions.         Patient Education    Home Fire Safety  Each year, thousands of people, including children, are injured and killed in home fires. Children are often curious about fire, and may not understand the dangers. This makes home fire safety practices especially important. Three important things you can do to keep your home safe from fire are:    Install smoke alarms in your home and make sure they work properly.    Teach children not to play with matches, lighters, and other materials that can be used to start fires. And keep these materials out of childrens reach.    Teach children what to do in case of fire. Create a fire safety action plan and practice it.  Read on for more details about keeping your family and home safe from fire.        Being Prepared for a Fire  A home fire can happen at any time. The following can help you be prepared:    Install smoke alarms on every level of your home, including the basement and outside all sleeping areas. This simple step cuts your familys risk of dying in a fire nearly in half.    Test smoke alarms monthly, and change the batteries once a year or when the alarm  chirps.    Dont disable smoke alarms, even for a short time.    Ask your local fire department for tips on where to place smoke alarms in your home.    Replace all smoke alarms every 10 years.    Consider using voice smoke alarms. These alarms allow you to substitute your own voice for the alarm sound. They are helpful because many children dont wake up to the sound of a regular smoke alarm.    Install carbon monoxide detectors near sleeping areas.    Be aware that carbon monoxide is a byproduct of smoke that can be deadly. Its a gas that you cant see, smell, or taste.    Consider buying a combination smoke alarm / carbon monoxide detector.    Keep fire extinguishers in the home.    Keep them in accessible locations, especially in the kitchen.    Check usage dates to make sure they are not .    Use fire extinguishers only when the fire is in a contained area and is not spreading. (Otherwise, you should focus on getting out of the home.)    Train adults to use fire extinguishers. (Children should focus on getting out of the home during a fire.)    If you live in an apartment, talk to your landlord about where smoke alarms are and how often they are tested. Also ask about fire extinguisher locations and emergency exit routes.  Indoor Fire Safety  Many things in your home are potential fire hazards. Follow these steps to help keep your home safe.    Be careful in the kitchen.    Never leave food thats cooking unattended.    If a fire breaks out in a cooking pan, put a lid on it to smother it. And never throw water on a grease fire. It will make the fire worse.    Conduct a home safety inspection. Look for anything, such as frayed wires and cords, that can cause a fire. Fix or remove any fire safety hazards you find.    Keep all matches and lighters in a secured drawer or cabinet out of the reach of children. Use childproof lighters.    Check to make sure all appliances, including the stove, are turned off before  leaving the home.    Know where the gas main shut-off is located.    Make sure space heaters are stable and have protective covers. Keep them at least 3 feet from anything that can burn, such as curtains. Dont use space heaters in areas where young kids spend time alone.    Keep flammable liquids such as kerosene and gasoline locked up and safely stored away from kids and heat.    Keep all smoking materials out of reach of children. And never smoke in bed. If possible, smoke outdoors only.  Outdoor Fire Safety  Fire can be a hazard outdoors as well as indoors. When outdoors, be sure to do the following:    Always supervise kids near a barbecue grill, campfire, or portable stove.    Dont use fire pits around children. Kids can fall into them, and pits can be hot even after the fire goes out.    Keep a garden hose or fire extinguisher handy when cooking outdoors in case of fire.  Teaching Your Child About Fire Safety  One of the best ways to keep your home safe from fire is education. Make sure everyone in your family knows fire safety rules, including children.    Teach your children the dangers of matches, lighters, and other dangerous items.    Teach them to never touch these and other objects that are hot, such as candles.    Have them tell you right away whenever they find matches or lighters. Explain that these items are tools for grown-ups, not toys. And never amuse children with matches or lighters.    Round up all matches and lighters and store them safely. In case you missed some, ask your children to tell you where any are located throughout your home.    Never leave a child alone in a room with a lit candle. Dont allow teens to have candles in their rooms.    Show children what to do in case of fire.    Be sure your kids know what the fire alarm sounds like and what to do if it goes off.    Teach kids what to do if their clothes catch fire: Stop, Drop to the ground, and Roll until the fire is put out. They  should also cover their face with their hands. Practice these steps with your children. Make sure they understand that running will make the fire burn faster.    Show children how to crawl below smoke during a fire.    Make sure kids know at least two escape routes from each room in the home. These escape routes can be windows.    Teach kids to test doors for nearby fire by feeling for heat with the back of their hand. If the door is warm or hot, they should try their second exit.    Explain to children that they cant hide from a fire. Hiding in a closet or under a bed wont make them safe. Instead, they should try to escape the home. And if they cant escape, they should let others know they are trapped. They can do this by shutting the door to the room, opening a window, and turning on the lights.    Talk to your local fire department.    Introduce your children to a . Let them know that firefighters will look different when in full protective gear. Tell them to never hide from firefighters, and to follow all directions from firefighters during a fire.    Find out if the fire department has a fire safety program for kids.      Create a Fire Safety Plan  Create a plan for your family to follow in case of a fire. Try making it a family project. Important steps for the plan include leaving the home right away and having a designated meeting place.    Make sure your child understands to get out and stay out. He or she should get out of the home immediately and not go back in, even if family members or pets are still inside.    Decide on a safe meeting place away from the home for everyone to gather.    Teach children to call 911 or emergency services from a cell phone or neighbors phone. Make sure they know to do this only after they are safely out of the home.    Teach your children the fire safety plan. Practice it and make sure they understand it.    Have fire drills twice a year to keep your children  prepared in case of fire.    Visit the National Fire Protection Association web site at www.nfpa.org for more information.      5480-1131 [employer]. [address] [place] [address], [place] [address]. All rights reserved. This information is not intended as a substitute for professional medical care. Always follow your healthcare professional's instructions.         Patient Education   Depression and Suicide in Older Adults  Nearly 2 million older Americans have some type of depression. Sadly, some of them even take their own lives. Yet depression among older adults is often ignored. Learn the warning signs. You may help spare a loved one needless pain. You may also save a life.       What Is Depression?  Depression is a mood disorder that affects the way you think and feel. The most common symptom is a feeling of deep sadness. People who are depressed also may seem tired and listless. And nothing seems to give them pleasure. Its normal to grieve or be sad sometimes. But sadness lessens or passes with time. Depression rarely goes away or improves on its own. Other symptoms of depression are:    Sleeping more or less than normal    Eating more or less than normal    Having headaches, stomachaches, or other pains that dont go away    Feeling nervous, empty, or worthless    Crying a great deal    Thinking or talking about suicide or death    Feeling confused or forgetful  What Causes It?  The causes of depression arent fully known. Certain chemicals in the brain play a role. Depression does run in families. And life stresses can also trigger depression in some people. That may be the case with older adults. They often face great burdens, such as the death of friends or a spouse. They may have failing health. And they are more likely to be alone, lonely, or poor.  How You Can Help  Often, depressed people may not want to ask for help. When they do, they may be ignored. Or, they may receive the wrong treatment.  You can help by showing parents and older friends love and support. If they seem depressed, help them find the right treatment. Talk to your doctor. Or contact a local mental health center, social service agency, or hospital. With modern treatment, no one has to suffer from depression.  Resources:    National [place] of Mental Health  671.442.8293  www.nimh.nih.gov    National [place] on Mental Illness  895.916.3023  www.félix.org    [facility]  444.662.4589  www.[facility].org    National Suicide Hotline  913.452.5159 (800-SUICIDE)      2848-9899 [employer]. [address] [place] [address]. All rights reserved. This information is not intended as a substitute for professional medical care. Always follow your healthcare professional's instructions.           Advance Directive  Patients advance directive was discussed and I am comfortable with the patients wishes.  Patient Education   Personalized Prevention Plan  You are due for the preventive services outlined below.  Your care team is available to assist you in scheduling these services.  If you have already completed any of these items, please share that information with your care team to update in your medical record.  Health Maintenance   Topic Date Due   ? ZOSTER VACCINES (2 of 3) 09/21/2017   ? MEDICARE ANNUAL WELLNESS VISIT  11/16/2018   ? DXA SCAN  11/16/2018   ? ADVANCE CARE PLANNING  02/11/2019   ? COLORECTAL CANCER SCREENING  08/03/2019   ? PNEUMOCOCCAL IMMUNIZATION 65+ LOW/MEDIUM RISK (2 of 2 - PPSV23) 12/12/2019   ? MAMMOGRAM  06/06/2020   ? FALL RISK ASSESSMENT  07/22/2020   ? INFLUENZA VACCINE RULE BASED (1) 08/01/2020   ? TD 18+ HE  02/11/2025 (Originally 7/3/2013)   ? LIPID  09/18/2024   ? HEPATITIS C SCREENING  Completed   ? HEPATITIS B VACCINES  Aged Out

## 2021-06-18 NOTE — PROGRESS NOTES
Subjective findings: The patient returns to the clinic today complaining of pain in the ball of both feet.  She indicated that the pain is worse on the left foot than the right foot.  She has had this pain for several months. The pain is aggravated with weightbearing and ambulation.  She stated that she can only weight-bear and walk for approximately 20-30 minutes before she has severe pain.  She stated that she does have some mild tingling involving the third and fourth toes of her feet.  She denies any associated redness or swelling.  She has not had any trauma or injury to her feet.  She continues to wear her orthotics.  She has no pain while resting.     Objective findings: Nails bilateral feet normal color.  Skin bilateral feet warm and intact.  DP and PT pulses +2 over 4 bilateral feet.  Capillary refill less than 2 seconds bilaterally.  Negative clonus and negative Babinski bilateral feet.  There is a very pronounced positive Víctor sign noted third intermetatarsal space left foot.  There is pain on palpation third intermetatarsal space right foot. Range of motion within normal limits bilateral feet.  Muscle power +5 over 5 bilaterally with lateral compartments.  There is severe pain on palpation of the plantar medial aspect of the right heel.     Assessment: Cooper's neuroma bilateral feet     Plan: The patient was informed that if her pain becomes severe I would recommend surgical excision of the neuroma on the left foot and nerve decompression of the common digital nerve third intermetatarsal space right foot. The patient stated she will contemplate having this procedure done if her pain persist.

## 2021-06-18 NOTE — PROGRESS NOTES
Office Visit - Follow Up   Suzan Ballard   62 y.o. female    Date of Visit: 6/4/2018    Chief Complaint   Patient presents with     Follow-up     fasting, denies concerns        Assessment and Plan   1. Essential hypertension  Controlled.  Continue amlodipine.  Check CBC and basic metabolic panel.  - HM2(CBC w/o Differential)  - Basic Metabolic Panel    2. Hyperlipidemia  Controlled.  Continue simvastatin.  Lipids on 1/13/2018 were good, LDL 94, HDL 63, triglycerides 132 and total cholesterol 183.  Check fasting lipids and liver function.  - Lipid Cascade  - Hepatic Profile    3. Hypothyroid  Supplemented by levothyroxine.  Thyroid function is normal.  Check TSH and free T4.  - Thyroid Stimulating Hormone (TSH)  - T4, Free    4. Meningioma  Followed by neurology at the [facility].  Had 2 previous excision of meningioma by neurosurgery.  Will see Dr. Perez of neurology in  November 2018 for her regular follow-up.  Her meningioma apparently has recurred and followed or monitored by  neurology.  Does not need repeat surgery yet because it is small.  Has seizure disorder.    5. Seizure disorder  On Lamictal alone.  No recurrence or breakthrough of her seizures.    6. Cooper's neuroma  Has left foot pains and  assessed to be Cooper's neuroma by podiatry,  Dr. Palacio.  Was advised surgery if she cannot handle the pains.  But her pains right now are still tolerable.    7. Visit for screening mammogram  Due for mammography.  We will schedule this.  - Mammo Screening Bilateral; Future    Falls into overweight category but lost 9 pounds since her last visit.  Reports she has been eating more healthy foods.      Follow up in 6 months.     History of Present Illness   This 62 y.o. old female here for follow-up.  Has hypertension controlled by amlodipine.  Has hyperlipidemia controlled by simvastatin.  Has hypothyroidism supplemented by levothyroxine.  Last thyroid function was normal.  Has meningioma status  post excision ×2.  Apparently her meningioma has recurred  but does not need intervention since it is a small.  Only needs monitoring.  Followed by [facility]y at the [facility].  Complained of left foot pains and evaluated by podiatry and diagnosed to be Cooper's neuroma.  Advised surgery if she cannot handle the pains anymore.  Overall feels well.  No other complaints.  Has lost some weight since her last visit.    Review of Systems   A 12 point comprehensive review of systems was negative except as noted..     Medications, Allergies and Problem List   Reviewed and updated             Chief Complaint   Follow-up (fasting, denies concerns)       Patient Profile   Social History     Social History Narrative        Past Medical History   Patient Active Problem List   Diagnosis     Meningioma     Peripheral Neuropathy     Hypothyroidism     Hyperlipidemia     Carpal Tunnel Syndrome     Essential Hypertension     Osteopenia     Seizure disorder     Osteoarthritis of right knee     GERD (gastroesophageal reflux disease)     Allergic rhinitis     Essential hypertension     Hyperlipidemia     Hypothyroid     Cooper's neuroma       Past Surgical History  She has no past surgical history on file.       Medications and Allergies   Current Outpatient Prescriptions   Medication Sig     amLODIPine (NORVASC) 5 MG tablet TAKE 1 TABLET BY MOUTH DAILY     fluticasone (FLONASE) 50 mcg/actuation nasal spray USE 1 SPRAY IN EACH NOSTRIL EVERY DAY; SHAKE BEFORE USE     lamoTRIgine (LAMICTAL) 100 MG tablet TAKE 1 AND 1/2 TABLETS BY MOUTH TWICE DAILY     levothyroxine (SYNTHROID, LEVOTHROID) 88 MCG tablet Take 1 tablet (88 mcg total) by mouth Daily at 6:00 am.     meclizine (ANTIVERT) 25 mg tablet Take 1 tablet (25 mg total) by mouth 3 (three) times a day as needed for dizziness or nausea.     simvastatin (ZOCOR) 20 MG tablet TAKE ONE TABLET BY MOUTH EVERY EVENING     Allergies   Allergen Reactions     Codeine         Family  and Social History   No family history on file.     Social History   Substance Use Topics     Smoking status: Never Smoker     Smokeless tobacco: Never Used     Alcohol use No        Physical Exam       Physical Exam  /70  Pulse 63  Ht 5' (1.524 m)  Wt 137 lb (62.1 kg)  SpO2 96%  BMI 26.76 kg/m2  General appearance: alert, appears stated age, cooperative and no distress  Head: Normocephalic, without obvious abnormality, atraumatic  Throat: lips, mucosa, and tongue normal; teeth and gums normal  Neck: no adenopathy, no carotid bruit, no JVD, supple, symmetrical, trachea midline and thyroid not enlarged, symmetric, no tenderness/mass/nodules  Lungs: clear to auscultation bilaterally  Heart: regular rate and rhythm, S1, S2 normal, no murmur, click, rub or gallop  Abdomen: soft, non-tender; bowel sounds normal; no masses,  no organomegaly  Extremities: extremities normal, atraumatic, no cyanosis or edema  Skin: Skin color, texture, turgor normal. No rashes or lesions  Neurologic: Grossly normal     Additional Information        Lamont Kapadia MD  Internal Medicine  Contact me at [phone]     Additional Information   Current Outpatient Prescriptions   Medication Sig     amLODIPine (NORVASC) 5 MG tablet TAKE 1 TABLET BY MOUTH DAILY     fluticasone (FLONASE) 50 mcg/actuation nasal spray USE 1 SPRAY IN EACH NOSTRIL EVERY DAY; SHAKE BEFORE USE     lamoTRIgine (LAMICTAL) 100 MG tablet TAKE 1 AND 1/2 TABLETS BY MOUTH TWICE DAILY     levothyroxine (SYNTHROID, LEVOTHROID) 88 MCG tablet Take 1 tablet (88 mcg total) by mouth Daily at 6:00 am.     meclizine (ANTIVERT) 25 mg tablet Take 1 tablet (25 mg total) by mouth 3 (three) times a day as needed for dizziness or nausea.     simvastatin (ZOCOR) 20 MG tablet TAKE ONE TABLET BY MOUTH EVERY EVENING     Allergies   Allergen Reactions     Codeine      Social History   Substance Use Topics     Smoking status: Never Smoker     Smokeless tobacco: Never Used     Alcohol  use No         Time: total time spent with the patient was 25 minutes of which >50% was spent in counseling and coordination of care

## 2021-06-19 NOTE — LETTER
Letter by Lamont Kapadia MD at      Author: Lamotn Kapadia MD Service: -- Author Type: --    Filed:  Encounter Date: 7/23/2019 Status: (Other)         Suzan Ballard  [address]  [place] [address]             July 23, 2019         Dear Ms. Ballard,    Below are the results from your recent visit:    Resulted Orders   Uric Acid   Result Value Ref Range    Uric Acid 4.9 2.0 - 7.5 mg/dL   Sedimentation Rate   Result Value Ref Range    Sed Rate 9 0 - 20 mm/hr       Normal uric acid and sed rate.  Hence, your finger swelling and pain are due to osteoarthritis.  See me for follow-up if these persist.  Thanks.    Please call with questions or contact us using [employer].    Sincerely,        Electronically signed by Lamont Kapadia MD

## 2021-06-19 NOTE — LETTER
Letter by Lamont Kapadia MD at      Author: Lamont Kapadia MD Service: -- Author Type: --    Filed:  Encounter Date: 9/18/2019 Status: (Other)         Suzan Ballard  [address]  [place] [address]             September 18, 2019         Dear Ms. Ballard,    Below are the results from your recent visit:    Resulted Orders   Lipid Cascade   Result Value Ref Range    Cholesterol 187 <=199 mg/dL    Triglycerides 90 <=149 mg/dL    HDL Cholesterol 68 >=50 mg/dL    LDL Calculated 101 <=129 mg/dL    Patient Fasting > 8hrs? Yes    Basic Metabolic Panel   Result Value Ref Range    Sodium 143 136 - 145 mmol/L    Potassium 3.7 3.5 - 5.0 mmol/L    Chloride 106 98 - 107 mmol/L    CO2 25 22 - 31 mmol/L    Anion Gap, Calculation 12 5 - 18 mmol/L    Glucose 93 70 - 125 mg/dL    Calcium 9.7 8.5 - 10.5 mg/dL    BUN 14 8 - 22 mg/dL    Creatinine 0.71 0.60 - 1.10 mg/dL    GFR MDRD Af Amer >60 >60 mL/min/1.73m2    GFR MDRD Non Af Amer >60 >60 mL/min/1.73m2    Narrative    Fasting Glucose reference range is 70-99 mg/dL per  American Diabetes Association (ADA) guidelines.   Hepatic Profile   Result Value Ref Range    Bilirubin, Total 2.0 (H) 0.0 - 1.0 mg/dL    Bilirubin, Direct 0.6 (H) <=0.5 mg/dL    Protein, Total 7.3 6.0 - 8.0 g/dL    Albumin 4.0 3.5 - 5.0 g/dL    Alkaline Phosphatase 98 45 - 120 U/L    AST 17 0 - 40 U/L    ALT 15 0 - 45 U/L   HM2(CBC w/o Differential)   Result Value Ref Range    WBC 3.7 (L) 4.0 - 11.0 thou/uL    RBC 5.38 3.80 - 5.40 mill/uL    Hemoglobin 14.5 12.0 - 16.0 g/dL    Hematocrit 44.7 35.0 - 47.0 %    MCV 83 80 - 100 fL    MCH 27.0 27.0 - 34.0 pg    MCHC 32.6 32.0 - 36.0 g/dL    RDW 12.4 11.0 - 14.5 %    Platelets 263 140 - 440 thou/uL    MPV 6.9 (L) 7.0 - 10.0 fL       Normal all labs specifically your lipids.  Some insignificant changes of no clinical importance.  Continue same medications.  Thank you.    Please call with questions or contact us using  MyChart.    Sincerely,        Electronically signed by Lamont Kapadia MD

## 2021-06-19 NOTE — LETTER
Letter by Lamont Kapadia MD at      Author: Lamont Kapadia MD Service: -- Author Type: --    Filed:  Encounter Date: 3/15/2019 Status: (Other)         Suzan Ballard  [address]  [place] [address]             March 15, 2019         Dear Ms. Ballard,    Below are the results from your recent visit:    Resulted Orders   Lipid Cascade   Result Value Ref Range    Cholesterol 161 <=199 mg/dL    Triglycerides 91 <=149 mg/dL    HDL Cholesterol 62 >=50 mg/dL    LDL Calculated 81 <=129 mg/dL    Patient Fasting > 8hrs? Yes    HM2(CBC w/o Differential)   Result Value Ref Range    WBC 4.1 4.0 - 11.0 thou/uL    RBC 5.01 3.80 - 5.40 mill/uL    Hemoglobin 13.8 12.0 - 16.0 g/dL    Hematocrit 39.8 35.0 - 47.0 %    MCV 79 (L) 80 - 100 fL    MCH 27.5 27.0 - 34.0 pg    MCHC 34.6 32.0 - 36.0 g/dL    RDW 12.9 11.0 - 14.5 %    Platelets 244 140 - 440 thou/uL    MPV 6.7 (L) 7.0 - 10.0 fL   Basic Metabolic Panel   Result Value Ref Range    Sodium 143 136 - 145 mmol/L    Potassium 3.7 3.5 - 5.0 mmol/L    Chloride 107 98 - 107 mmol/L    CO2 27 22 - 31 mmol/L    Anion Gap, Calculation 9 5 - 18 mmol/L    Glucose 82 70 - 125 mg/dL    Calcium 9.5 8.5 - 10.5 mg/dL    BUN 14 8 - 22 mg/dL    Creatinine 0.64 0.60 - 1.10 mg/dL    GFR MDRD Af Amer >60 >60 mL/min/1.73m2    GFR MDRD Non Af Amer >60 >60 mL/min/1.73m2    Narrative    Fasting Glucose reference range is 70-99 mg/dL per  American Diabetes Association (ADA) guidelines.   Hepatic Profile   Result Value Ref Range    Bilirubin, Total 1.9 (H) 0.0 - 1.0 mg/dL    Bilirubin, Direct 0.6 (H) <=0.5 mg/dL    Protein, Total 7.1 6.0 - 8.0 g/dL    Albumin 3.8 3.5 - 5.0 g/dL    Alkaline Phosphatase 83 45 - 120 U/L    AST 13 0 - 40 U/L    ALT 14 0 - 45 U/L   Vitamin D, Total (25-Hydroxy)   Result Value Ref Range    Vitamin D, Total (25-Hydroxy) 33.8 30.0 - 80.0 ng/mL    Narrative    Deficiency <10.0 ng/mL  Insufficiency 10.0-29.9 ng/mL  Sufficiency 30.0-80.0 ng/mL  Toxicity (possible)  >100.0 ng/mL   Thyroid Stimulating Hormone (TSH)   Result Value Ref Range    TSH 0.48 0.30 - 5.00 uIU/mL   T4, Free   Result Value Ref Range    Free T4 1.2 0.7 - 1.8 ng/dL       Normal all labs specifically your lipids.  Some insignificant changes of no clinical importance.  Continue same medications.  Thank you.    Please call with questions or contact us using [employer].    Sincerely,        Electronically signed by Lamont Kapadia MD

## 2021-06-20 NOTE — LETTER
Letter by Lamont Kapadia MD at      Author: Lamont Kapadia MD Service: -- Author Type: --    Filed:  Encounter Date: 8/27/2020 Status: (Other)        [facility]  [address]  [facility] [address]  [phone]         Suzan Ballard  [address]  [place] [address]        08/27/20    Dear Suzan Ballard,     At [facility] we care about your health and well-being. Your primary care provider is committed to ensuring you receive high quality care and has chosen a network of specialists to assist in providing that care. Recently Dr. Kapadia referred you to Radiology for a Mammogram.     Please call [phone] Option 1 at your earliest convenience for assistance in scheduling an appointment. Thank you for choosing [facility] for your healthcare needs.       Sincerely,       [facility] Specialty Scheduling

## 2021-06-20 NOTE — PROGRESS NOTES
Outpatient Mental Health Treatment Plan    Name:  Suzan Ballard  :  1955  MRN:  [id number]    Treatment Plan:  Initial Treatment Plan  Intake/initial treatment plan date:  Initial   Benefit and risks and alternatives have been discussed: Yes  Is this treatment appropriate with minimal intrusion/restrictions: Yes  Estimated duration of treatment:  Approximately 6 sessions.  Anticipated frequency of services:  Every 2 weeks  Necessity for frequency: This frequency is needed to establish therapeutic goals and for continuity of care in order to monitor progress.  Necessity for treatment: To address cognitive, behavioral, and/or emotional barriers in order to work toward goals and to improve quality of life.    Session Type: Patient is presenting for an Individual session.    Plan:   Anxiety    Goal:  Decrease average anxiety level as measured objectively using the KATALINA 7 from 12 to less than 8 and subjectively from a 5/10 to less than 5/10.  .   Strategies: ? [x]Learn and practice relaxation techniques and other coping strategies (e.g., thought stopping, reframing, meditation)     ? [x] Increase involvement in meaningful activities     ? [x] Discuss sleep hygiene     ? [x] Explore thoughts and expectations about self and others     ? [x] Identify and monitor triggers for panic/anxiety symptoms     ? [x] Implement physical activity routine (with physician approval)     ? [x] Continue compliance with medical treatment plan (or explore barriers)   Degree to which this is a problem: 2  Degree to which goal is met: 2  Date of Review: 19    Adjustment to changes    Goal:  Increase clarity and understanding of the changes that are occurring.   Strategies: ?[x]  Explore thoughts and expectations about self and others       [x] Explore emotional reactions to changes     ?[x] Learn and practice relaxation techniques and other coping strategies     [x] Implement physical activity routine (with physician approval)        "         [x] Increase involvement in meaningful activities                [x] Engage in values clarification and goal-setting  Degree to which this is a problem: 3  Degree to which goal is met: 2  Date of Review: 1/2/19       Functional Impairment:  1=Not at all/Rarely  2=Some days  3=Most Days  4=Every Day    Personal : 3  Family : 3  Social : 2   Work/school : retired    Diagnosis:  Adjustment Disorder: mixed anxiety and depression     WHODAS 2.0 12-item version: 15    Scores presented in qualifiers to represent level of disability.  MILD Problem (slight, low, ...) 5-24%  H1= 5  H2= 0  H3= 0    Clinical assessments and measures completed:. KATALINA-7 and PHQ-9     Strengths:  Intelligent, thoughtful, care about my family, helpful   Limitations:  \"wrapped up in my thoughts\", closed off to others/private  Cultural Considerations: Ms. Ballard was born and raised in the [facility].  She does appear to have some values ties to the [facility], like a strong familial connection and shame about her 's behavior.  She would like to protect him from shame.     Persons responsible for this plan: Patient and Provider            Mental Health Professional Signature           Patient Signature:              Guardian Signature             Provider: Performed and documented by EYAL Posey   Date:  10/2/2018      "

## 2021-06-20 NOTE — PROGRESS NOTES
Mental Health Visit Note    9/12/2018    Start time: 900    Stop Time: 955   Session # 1    Session Type: Patient is presenting for an Individual session.    Suzan Ballard is a 62 y.o. female is being seen today for    Chief Complaint   Patient presents with     Intake #1     anxiety, sadness      New symptoms or complaints: The patient's  recently told her that he has had feelings and has been financially helping out women in the [facility], he had heart surgery, and now has returned back to [facility] while she stays here.     Functional Impairment:   Personal: 4  Family: 4  Work: retired  Social:2    Clinical assessment of mental status: The patient was on time for their scheduled session.  They were appropriately dressed with good grooming and hygiene.  The patient was oriented X4.  Patient was pleasant and cooperative.  Mood and affect were depressed, sad, anxious and congruent with the content of her speech.   The patient made appropriate eye contact.  Recent and remote memories were intact.  Thought process was logical and linear.  Speech/language (tone and rate) were normal.  Insight and judgment were adequate.    Suicidal/Homicidal Ideation present: None Reported This Session    Patient's impression of their current status:  Patient comes to mental health clinic at the recommendation of her primary care provider, Dr. Kapadia.  The patient has recently been to the ER for an anxiety episode.  She indicates being upset over confusing behavior by her .  The patient is fearful that her  will divorce her and she will have shame from her family.      Therapist impression of patients current state: The patient reviewed and signed informed consent.  Reviewed with the patient the reason for her referral.  The patient essentially has no mental health history outside of adjusting to what sounds like behaviors by her  that are not appropriate for the relationship.  The patient reports  feeling sad and that she will have times where her sadness will escalate to anxiety/panic.  She would like to feel better.  Patient is agreeable to pursuing psychotherapy and will come back to finish DA.  She is comfortable with primary care continuing management of her medications.      Type of psychotherapeutic technique provided: Client centered and MI    Progress toward short term goals:No short term goals established as this was the patient's initial session.  Attendance is evidence to her motivation and willingness.     Review of long term goals: No treatment plan yet developed.  Anticipate this will be done 1-2 sessions following DA completion.      Diagnosis:   1. Adjustment disorder with mixed anxiety and depressed mood        Plan and Follow up: 1.  Patient to schedule for continued follow up psychotherapy appointments.    2.  Patient will use their crisis plan and/or access crisis services should symptoms       become worse.      3.  Patient to remain medication compliant.  4.  Patient to abstain from drugs and alcohol.  5.  Patient to follow up to complete DA.       Discharge Criteria/Planning: Patient will continue with follow-up until therapy can be discontinued without return of signs and symptoms.    Yoni Remy

## 2021-06-20 NOTE — PROGRESS NOTES
Standard Diagnostic Assessment  Date(s): 18 Start Time: 900 Stop Time: 1000  Patient Name: Suzan Ballard  Age: 62  1955      Referral Source: Dr. Kapadia, [facility]  Therapist: ANIRUDH Posey, [facility] Persons Present: patient and therapist  Session Type: Patient is presenting for an individual session  Chief Complaint (in the patients words; reason patient believes they have been referred):   Ms. Ballard was referred to the mental health clinic by her primary care provider for evaluation of her anxiety and depression since the introduction of family interpersonal stressors.   Patient s expectation for treatment (patient stated initial goal; i.e.:  I want to let go of my worries , Medication treatment if indicated):  Ms. Ballard would like to feel better, not sad and crying anymore.    Presenting Problem/History:  Issues/Stressors:   Marital issues, anxiety, depression, sadness  Physical Problems: Flushes or chills, Sweating, Abdominal pain, Trembling, Weight loss, Inability to sleep, Decreased energy and Decreased appitite    Social Problems: Distrust of others, Unstable relationships, Need for excessive advice  and Sexual difficulties    Behavioral Problems: problems staying alone and Temper outbursts    Cognitive Problems: Distractability, Poor attention, Poor memory, Forgetful, Recurrent bad memories, Paranoia and Worries    Emotional Problems: Anxious, Angry, Nervous, Irritable, Emptiness, Boredom, Excessive fears, Restricted emotion, Depressed mood, Feelings of shame and Feelings of guilt      Functional Impairments:   Personal: 3  Family: 4  Work: retired  Social: 2   How does the presenting problem affect patients daily functioning:     Ms. Ballard describes withdrawing, isolating, crying and sadness.  She did go to the ER one night because her anxiety had gotten very high.    Onset/Frequency/Duration presenting problem symptoms:    Ms. Ballard describes that her anxiety  and sadness have been pretty elevated since the time her  left for [facility] especially.  It has been daily and at times pretty intense.    How does the patient perceive his/her problem?  Ms. Ballard seems to have an understanding that she is needing to adjust to her situation.  She believes/worries that right now it might mean others won't come to her for advice because this current situation will show she doesn't know how to have a good marriage.   Family/Social History:  Current living situation (Household members, housing status, stability, multiple moves, potential eviction):  Ms. Ballard and her  have condos in the [place] and in the [facility].  She does describe the units as safe, stable and secure.   Marriages/Significant other (including patients evaluation of the relationship quality):  Ms. Ballard has been  for almost 40 years now.  She describes it as satisfactory until the past few years.  She is confused and upset because she learned that her  who visits the [facility]- where they are from and where she would go to until her health became an issue, was seeing a younger woman and financially helping her.  She and her  had an argument, he suggested  next time he goes to the [facility].    Children (sex and ages, any significant issues):  2 adult daughter and grandchildren.  She describes relationship with kids as good and supportive.   Parents (ages, living or , how many years ):   Parents are 84 and 87 and still , 66 years now.   Siblings (birth order, ages, significant issues):   5 sisters, 1 brother and 2 brothers who are .    Climate in family of origin (how does the patient perceive their childhood experience):  Ms. Ballard reports a happy childhood despite not having a lot of money.   Education (type and level of education):  2 years of college  Problems with Learning or School (developmental issues, learning disabilities,  behavioral concerns in school)  No academic or behavioral issues in school were raised.   Developmental factors (developmental milestones, head injuries, CVA s, etc. that may have impeded milestones):   She does report history of short term issues following 2 surgeries meningoma.    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      Financial Concerns (basic status, housing, food, clothing are they on any assistance including SSI/SSDI):   Stressed about finances given her  has supported women in the [facility] and she isn't sure if he will do this again.     Significant life events (what does the patient identify as a personal life changing/influencing event):  Current situation involving  and seeing other women in the [facility].     Sexual/physical/emotional/financial abuse/traumatic event. (any child protection involvement; who reported, Impact on patient/family/other):   None reported    Contextual Non-personal factors contributing to the patients concerns (divorce in family, nation/natural disasters):  Immigrated from the [facility] with her  in 1984.  She does acknowledge a dual cultural background/home.  In the current context, she is carrying shame for the events of her  and has fears that her family will no longer come to her for their own relationship advice.      Significant personal relationships including patient s evaluation of the relationship quality (Co-worker s, neighbor s, AA groups, Yazidi peers, etc.):   Indicates having several friends that she considers supportive although she is ashamed to tell them of this.     Support network(s)/Resources (including strength and quality of social networks, who does the client consider supportive, other agencies or services patient uses):   [employer] Y[employer], medical team   Family, friends    Belief system:   "  Jain      Cultural influences and impact on patient (ask about all aspects of culture and ask which are relevant to the patient. Go beyond nationality and ethnicity. Consider biases, life style, community style, i.e.: urban, poverty, abuse, etc). See page 5 Diagnostic Assessment, Clinical Training for descriptors):  Ms. Ballard was born and raised in the [facility].  She does appear to have some values ties to the [facility], like a strong familial connection and shame about her 's behavior.  She would like to protect him from shame.      Cultural impact on health and health care (how does patient s culture influence how the patient receives health care):   She is agreeable to western medicine.     Legal Problems (DUI S, divorce, law suits, etc.):  Currently no legal issues although she is worried she might begin to have them.      Strengths/personal resources (what does the patient do well, what is going well in life, positive personality characteristics):  Intelligent, thoughtful, care about my family, helpful     Weaknesses (what does patient identify as a weakness):  \"wrapped up in my thoughts\", closed off to others/private    Hobbies/Interests:    Shopping, ballroom dancing, socials     Assessment of client needs (based on baseline measurements, symptoms, behaviors, skills, abilities, resources, vulnerabilities, safety needs):    Psychotherapy- processing of emotions and coping skills for anxiety    Family Mental Health/Medical History  Family Mental Health:    None reported    Family history of Suicide:  None reported    Family history Chemical Dependency:    Father- alcohol    Family Medical history:   Father- gout, mother- kidney stones      Patient Medical History  Hospitalizations (When/Where):     Ms. Ballard has been hospitalized twice at [facility] for surgery of meningioma.      Medical diagnoses/concerns: (i.e.: Heart disease, thyroid problems,  Bld. Pressure,  seizures,  head " Inj., Other)   Per Ms. Ballard's last office visit with Dr. Kapadia-   1. Essential hypertension     2. Hyperlipidemia    3. Seizure disorder (H)     Current physician/other non psychiatric medical provider s:    Dr. Kapadia, [facility]                  Date of last medical exam:   9/4/18    Current Medications:    Current Outpatient Prescriptions:      ALPRAZolam (XANAX) 0.25 MG tablet, Take 1 tablet (0.25 mg total) by mouth 3 (three) times a day as needed for anxiety., Disp: 30 tablet, Rfl: 0     amLODIPine (NORVASC) 5 MG tablet, Take 2 tablets (10 mg total) by mouth daily., Disp: 180 tablet, Rfl: 3     fluticasone (FLONASE) 50 mcg/actuation nasal spray, USE 1 SPRAY IN EACH NOSTRIL EVERY DAY; SHAKE BEFORE USE, Disp: 16 g, Rfl: 3     lamoTRIgine (LAMICTAL) 100 MG tablet, TAKE 1 AND 1/2 TABLETS BY MOUTH TWICE DAILY, Disp: 270 tablet, Rfl: 3     levothyroxine (SYNTHROID, LEVOTHROID) 88 MCG tablet, Take 1 tablet (88 mcg total) by mouth Daily at 6:00 am., Disp: 180 tablet, Rfl: 1     meclizine (ANTIVERT) 25 mg tablet, Take 1 tablet (25 mg total) by mouth 3 (three) times a day as needed for dizziness or nausea., Disp: 30 tablet, Rfl: 4     simvastatin (ZOCOR) 20 MG tablet, TAKE 1 TABLET BY MOUTH EVERY EVENING, Disp: 90 tablet, Rfl: 0      Past Mental Health History:    Previous mental health diagnosis & Date of Diagnosis:  Adjustment Disorder with mixed anxiety/depression  8/21/18- DA with ANIRUDH Villalpando, [facility]  DA reviewed     Hx of Mental Health Treatment or Services:  None reported    Hx of MH Tx/Hospitalizations (When/Where: must include a review of patient s record.  If not available, why, what if anything are you doing to obtain a record?):   None reported    Hx of Psychiatric Medications:  See medication list       Suicidal/Homicidal Risk Assessment:  Suicidal: Intent: virtually non-existent  Ideation:Passive  History of Past Attempt(s): Ms. Ballard does not have a history of past attempts.    Crisis Plan:  Ms. Ballard agrees to call a family member or a Evangelical friend should she begin to wish she were dead.  If her thoughts progress to planning a suicide, Ms. Ballard does agree to go to ER or call police.    Homicidal: None reported   History of Aggression towards others: none reported  History of destruction to property: none reported    Chemical Use/Abuse History  Alcohol:  [x] None Reported    [] Yes   [] No  Street Drugs:   [x] None Reported    [] Yes   [] No  Prescription Drugs:   [x] None Reported    [] Yes   [] No  Tobacco:   [x] None Reported    [] Yes   [] No  Caffeine:   [] None Reported    [x] Yes   [] No  Type: coffee    Frequency (daily, weekly, occasionally): daily   Currently in a treatment program:   [] Yes   [x] No    History of CD Treatment:      [x] None Reported         CAGE-AID (screening to determine a patients use/abuse/dependency):      0/4    Non- Substance Abuse addictive Behaviors/Compulsive Behaviors:  [] Gambling     [] Sex     [] Pornography    [] Shopping     [] Eating     [] Self-Injury  [] Other           [x] None Reported    [] Hoarding  MENTAL STATUS EVALUATION  Grooming: Well groomed  Attire: Appropriate  Age: Appears Stated  Behavior Towards Examiner: Cooperative  Motor Activity: Within normal   Eye Contact: Appropriate  Mood: Sad  Affect: Blunted, Tearful and Depressed  Speech/Language: Within normal  Attention: Within normal  Concentration: Within normal  Thought Process: Within normal  Thought Content: Hallucinations: Within noraml  Delusions: Within normal  Orientation: X 3  Memory: No Evidence of Impairment  Judgement: No Evidence of Impairment  Estimated Intelligence: Average  Demonstrated Insight: Adequate  Fund of Knowledge: adequate  Clinical Impressions/Assessment/Recommendations:   Clinical summary: Cause, prognosis, likely consequences of symptoms. Strengths, Cultural influences, Life situations, relationships, health concern, and how Dx interacts or impacts with  client s life.   Ms. Ballard is a 62 year old  [facility] woman who was referred to the mental health clinic by her primary care provider for assessment and treatment recommendations of her anxiety and mood in the context of marital issues.  The patient does not have a significant history of mental health.  She did complete a DA with an LICSW in our clinic as it was the start of the odd behavior by her  and her anxiety/depressive symptoms.  The patient's adjustment issues do appear to be likely from the marital stress/strife.  It is possible that an extra layer is the influence her culture is having on how she may be interpreting these events.  Essentially it appears most likely that the patient's current level of anxiety and depressed mood are caused by the major life incident and transition she is experiencing.    Ms. Ballard is seeking treatment as her anxiety especially caused her to seek help in a local ER a few nights ago.  She is having trouble eating, sleeping and her stomach is often times upset.    Prioritization of needed mental Health ancillary or other services.   Psychotherapy services  How Diagnostic criteria is met: (Include symptoms, frequency, duration, functional impairments).  Ms Ballard reports a depressed/sad mood and affect, she has loss interest in activities and people.  In addition Ms. Ballard is experiencing periods of racing/questioning thoughts that she can't seem to make stop.  All these symptoms are happening in a larger pool of stressors/life transitions which is what explains her Adjustment Disorder: mixed anxiety and depression.    Explanation for any provisional diagnosis. Hypothesis why alternative diagnosis was considered and ruled out.  Major Depression  Anxiety  This can be looked at again once the life transition and the threat it poses to her subside.    Recommendations (treatment, referrals, services needed).  Patient would benefit from continued psychotherapy  services every 1-2 weeks to further address presenting symptoms and concerns.  At this time, patient will continue to have medications managed by her primary care provider.  This does seem to be a reasonable request.   Diagnosis (non-Axial as defined in DSM-5)  Adjustment Disorder- mixed anxiety, depression   Provisional Diagnosis (list only- no explanation needed)  Major Depression  Anxiety   Sources/references used in completing this assessment:   -Face to face interview  -Patient Chart  -Adult Intake Questionnaire  -Measures completed: WHODAS, PHQ-9, KATALINA-7, C-SSRS, CAGE   WHODAS 2.0 12-item version: 15    Scores presented in qualifiers to represent level of disability.  MILD Problem (slight, low, ...) 5-24%  H1= 5  H2= 0  H3= 0    PHQ-9: 18 . Difficulty with daily functioning= somewhat .              Indicates moderate severity.    KATALINA-7: 20 . Difficulty with daily functioning= somewhat .               Indicates severe severity.     C-SSRS: no-mild risk of suicide   Score= 0    Assessment of client resolving presenting mental health concerns:  Ability  [] low     [] average     [x] high  Motivation [] low     [x] average     [] high  Willingness [] low     [x] average     [] high    Initial Assessment Objectives (ex: Refer to psychiatry/psych testing, Return for follow up psychotherapy, Refer to, Obtain, Administer measures, etc.):  1. Schedule follow up psychotherapy session.   2. Talk to MD if problems occur with medications or to have a new conversation.   Is patient's family involved in the treatment?  [x] No- patient request     Therapist s Signature/Supervision/co-signature statement:   ANIRUDH Posey, LICSW

## 2021-06-20 NOTE — PROGRESS NOTES
Mental Health Visit Note    9/25/2018    Start time: 900    Stop Time: 955   Session # 2    Session Type: Patient is presenting for an Individual session.    Suzan Ballard is a 62 y.o. female is being seen today for    Chief Complaint   Patient presents with     MH Follow Up     Anxiety     Depression     New symptoms or complaints: None    Functional Impairment:   Personal: 3  Family: 3  Work: retired  Social:2    Clinical assessment of mental status: The patient was on time for their scheduled session.  They were appropriately dressed with good grooming and hygiene.  The patient was oriented X4.  Patient was pleasant and cooperative.  Mood and affect were sad.   The patient made appropriate eye contact.  Recent and remote memories were intact.  Thought process was logical and linear.  Speech/language (tone and rate) were normal.  Insight and judgment were adequate.    Suicidal/Homicidal Ideation present: None Reported This Session    Patient's impression of their current status:  Patient comes to session indicating that her anxiety and sadness have improved some since last session.  She cites talking to her kids and family members about the issues involving her and her .      Therapist impression of patients current state: Patient's thoughts/feelings were explored and validated.  Affirmed the patient's courage to do the difficult things like talking to family members.  Patient seems to be going through different stages of grief at the thought of  from her .  Handout on grief stages was provided.  Normalization of emotions with encouraged to honor those feelings was given.     Type of psychotherapeutic technique provided: Client centered and CBT    Progress toward short term goals:Progress as expected, as evidenced by patient's attendance and engagement.  She also notes decrease in anxiety/depression as she talks to her family.     Review of long term goals: DA is completed and treatment  planning will be targeted at next session.  Today's session focused on significant change in situation from previous sessions, that is she was able to gain support from family through disclosure.     Diagnosis:   1. Adjustment disorder with mixed anxiety and depressed mood        Plan and Follow up: 1.  Patient to schedule for continued follow up psychotherapy appointments.    2.  Patient will use their crisis plan and/or access crisis services should symptoms       become worse.      3.  Patient to remain medication compliant.  4.  Patient to abstain from drugs and alcohol.  5.  Treatment planning at next session.       Discharge Criteria/Planning: Patient will continue with follow-up until therapy can be discontinued without return of signs and symptoms.    Yoni Remy

## 2021-06-20 NOTE — PROGRESS NOTES
Office Visit - Follow Up   Suzan Ballard   62 y.o. female    Date of Visit: 9/4/2018    Chief Complaint   Patient presents with     Follow-up     [facility] ED F/U-HTN        Assessment and Plan   1. Adjustment disorder with anxious mood  Was seen in the emergency room because of anxiety causing her increased blood pressure.  Found out that her  was playing around  with women  in the [employer].  Apparently saw  several texts sent by his women from the [employer].  Confronted him on this but he was nonchalant about it.  Even mentioned to her a trial separation.  Because of her distress her blood pressure  increased  yesterday.  Now she is very devastated.  Has been crying all day long.  Does not want a lump on her has been on this again.  Advised marriage counseling but apparently  her   refused.  Will refer to psychology  to help her sort this out with  psychotherapy.  Will give her alprazolam 0.25 mg 3 times a day as needed for her anxiety.  Will see her in 4 weeks.  - Ambulatory referral to Psychology  - ALPRAZolam (XANAX) 0.25 MG tablet; Take 1 tablet (0.25 mg total) by mouth 3 (three) times a day as needed for anxiety.  Dispense: 30 tablet; Refill: 0    2. Essential hypertension  No controlled because she just took her blood event this morning.  Continue amlodipine 10 mg daily.  - amLODIPine (NORVASC) 5 MG tablet; Take 2 tablets (10 mg total) by mouth daily.  Dispense: 180 tablet; Refill: 3    3. Hyperlipidemia  Controlled.  Last lipids were good.  Continue simvastatin.    4. Seizure disorder (H)  No breakthrough or recurrence.  Continue Lamictal.    Reviewed emergency room notes and recommendations.  Reviewed her labs including her EKG.    Follow up in 4 weeks.     History of Present Illness   This 62 y.o. old female is here for ED follow-up.  Was seen yesterday in the emergency room for increased blood pressure.  Was anxious and stressed causing her blood pressure  to increase.  Found out her   has been going around well in the [facility].  Saw texts on his cell phone from different women in the [facility].  Confronted him on this but he was not concerned at all.  Even  he retorted  something about trial separation.  Feels her  wants out from their marriage.  Cannot handle this.  So she gets anxious and stressed  to the point causing panic and anxiety attacks.  Hence her blood pressure goes up.  Crying and stressed during her visit with me.    Review of Systems   A 12 point comprehensive review of systems was negative except as noted..     Medications, Allergies and Problem List   Reviewed and updated             Chief Complaint   Follow-up ([facility] ED F/U-HTN)       Patient Profile   Social History     Social History Narrative        Past Medical History   Patient Active Problem List   Diagnosis     Meningioma     Peripheral Neuropathy     Hypothyroidism     Hyperlipidemia     Carpal Tunnel Syndrome     Essential Hypertension     Osteopenia     Seizure disorder (H)     Osteoarthritis of right knee     GERD (gastroesophageal reflux disease)     Allergic rhinitis     Essential hypertension     Hyperlipidemia     Hypothyroid     Cooper's neuroma       Past Surgical History  She has no past surgical history on file.       Medications and Allergies   Current Outpatient Prescriptions   Medication Sig     amLODIPine (NORVASC) 5 MG tablet Take 2 tablets (10 mg total) by mouth daily.     fluticasone (FLONASE) 50 mcg/actuation nasal spray USE 1 SPRAY IN EACH NOSTRIL EVERY DAY; SHAKE BEFORE USE     lamoTRIgine (LAMICTAL) 100 MG tablet TAKE 1 AND 1/2 TABLETS BY MOUTH TWICE DAILY     levothyroxine (SYNTHROID, LEVOTHROID) 88 MCG tablet Take 1 tablet (88 mcg total) by mouth Daily at 6:00 am.     meclizine (ANTIVERT) 25 mg tablet Take 1 tablet (25 mg total) by mouth 3 (three) times a day as needed for dizziness or nausea.     simvastatin (ZOCOR) 20 MG tablet TAKE 1 TABLET BY MOUTH EVERY EVENING      ALPRAZolam (XANAX) 0.25 MG tablet Take 1 tablet (0.25 mg total) by mouth 3 (three) times a day as needed for anxiety.     Allergies   Allergen Reactions     Codeine         Family and Social History   No family history on file.     Social History   Substance Use Topics     Smoking status: Never Smoker     Smokeless tobacco: Never Used     Alcohol use No        Physical Exam       Physical Exam  /80  Pulse 77  Ht 5' (1.524 m)  Wt 132 lb (59.9 kg)  SpO2 98%  BMI 25.78 kg/m2  General appearance: alert, appears stated age, cooperative, mild distress and anxious  Head: Normocephalic, without obvious abnormality, atraumatic  Neck: no adenopathy, no carotid bruit, no JVD, supple, symmetrical, trachea midline and thyroid not enlarged, symmetric, no tenderness/mass/nodules  Lungs: clear to auscultation bilaterally  Heart: regular rate and rhythm, S1, S2 normal, no murmur, click, rub or gallop  Abdomen: soft, non-tender; bowel sounds normal; no masses,  no organomegaly  Extremities: extremities normal, atraumatic, no cyanosis or edema  Skin: Skin color, texture, turgor normal. No rashes or lesions  Psychiatric: Sad affect, distress mood     Additional Information        Lamont Kapadia MD  Internal Medicine  Contact me at [phone]     Additional Information   Current Outpatient Prescriptions   Medication Sig     amLODIPine (NORVASC) 5 MG tablet Take 2 tablets (10 mg total) by mouth daily.     fluticasone (FLONASE) 50 mcg/actuation nasal spray USE 1 SPRAY IN EACH NOSTRIL EVERY DAY; SHAKE BEFORE USE     lamoTRIgine (LAMICTAL) 100 MG tablet TAKE 1 AND 1/2 TABLETS BY MOUTH TWICE DAILY     levothyroxine (SYNTHROID, LEVOTHROID) 88 MCG tablet Take 1 tablet (88 mcg total) by mouth Daily at 6:00 am.     meclizine (ANTIVERT) 25 mg tablet Take 1 tablet (25 mg total) by mouth 3 (three) times a day as needed for dizziness or nausea.     simvastatin (ZOCOR) 20 MG tablet TAKE 1 TABLET BY MOUTH EVERY EVENING     ALPRAZolam  (XANAX) 0.25 MG tablet Take 1 tablet (0.25 mg total) by mouth 3 (three) times a day as needed for anxiety.     Allergies   Allergen Reactions     Codeine      Social History   Substance Use Topics     Smoking status: Never Smoker     Smokeless tobacco: Never Used     Alcohol use No         Time: total time spent with the patient was 25 minutes of which >50% was spent in counseling and coordination of care

## 2021-06-20 NOTE — PROGRESS NOTES
Office Visit - Follow Up   Suzan Ballard   62 y.o. female    Date of Visit: 10/10/2018    Chief Complaint   Patient presents with     Follow-up     not fasting, wants flu shot        Assessment and Plan   1. Essential hypertension  Now well controlled with increase of her amlodipine to 10 mg daily.  Continue same dose of amlodipine.    2. Hyperlipidemia  Controlled.  Continue simvastatin.  Lipids in 6/4/2018 very good LDL 80, HDL 55, triglycerides 89 and total cholesterol 153.    3. ANSHU (obstructive sleep apnea)  Wants to see  different sleep doctor for her sleep apnea.  Has not used her CPAP because it is broken.  Does not sleep well.  Will refer to sleep medicine for reevaluation of her  ANSHU.  - Ambulatory referral to Sleep Medicine    4. Adjustment disorder with anxious mood  Has adjustment disorder with anxious mood because of problem with her   who he found out he was playing around.  Seeing a clinical therapist every 2 weeks.  Advised to continue seeing her clinical therapist until she is able to resolve her marital problem.  She is thinking of  and  her .    5. Need for immunization against influenza  Flu shot was given.  - Influenza, Recombinant, Inj, Quadrivalent, PF, 18+YRS      Follow up in 3 months.     History of Present Illness   This 62 y.o. old female is here for follow-up. Had increased blood pressure on her last visit because of marital problems.  Found her  was playing around with other women even  up to now.  Seeing a clinical therapist every 2 weeks for her adjustment disorder with anxious mood.  Takes alprazolam only as needed.  Unable to resolve her marital problems yet.  But already thinking she will separate or divorce her .  But her blood pressure is now controlled with increase of her amlodipine to 10 mg daily.  Has obstructive sleep apnea.  But has not used her CPAP for 6 months because it is broken.  Wants to see a new sleep doctor. Used  to see Dr. Hernandez but does not want to go back to see him for reevaluation of her sleep apnea.  Has lost 3 pounds because of her stress.  Does not sleep well because she is not using her CPAP aggravated by her anxiety and stress brought about by her marital problem.    Review of Systems   A 12 point comprehensive review of systems was negative except as noted..     Medications, Allergies and Problem List   Reviewed and updated             Chief Complaint   Follow-up (not fasting, wants flu shot)       Patient Profile   Social History     Social History Narrative        Past Medical History   Patient Active Problem List   Diagnosis     Meningioma     Peripheral Neuropathy     Hypothyroidism     Hyperlipidemia     Carpal Tunnel Syndrome     Essential Hypertension     Osteopenia     Seizure disorder (H)     Osteoarthritis of right knee     GERD (gastroesophageal reflux disease)     Allergic rhinitis     Essential hypertension     Hyperlipidemia     Hypothyroid     Cooper's neuroma       Past Surgical History  She has no past surgical history on file.       Medications and Allergies   Current Outpatient Prescriptions   Medication Sig     ALPRAZolam (XANAX) 0.25 MG tablet Take 1 tablet (0.25 mg total) by mouth 3 (three) times a day as needed for anxiety.     amLODIPine (NORVASC) 5 MG tablet Take 2 tablets (10 mg total) by mouth daily.     fluticasone (FLONASE) 50 mcg/actuation nasal spray USE 1 SPRAY IN EACH NOSTRIL EVERY DAY; SHAKE BEFORE USE     lamoTRIgine (LAMICTAL) 100 MG tablet TAKE 1 AND 1/2 TABLETS BY MOUTH TWICE DAILY     levothyroxine (SYNTHROID, LEVOTHROID) 88 MCG tablet Take 1 tablet (88 mcg total) by mouth Daily at 6:00 am.     meclizine (ANTIVERT) 25 mg tablet Take 1 tablet (25 mg total) by mouth 3 (three) times a day as needed for dizziness or nausea.     simvastatin (ZOCOR) 20 MG tablet TAKE 1 TABLET BY MOUTH EVERY EVENING     Allergies   Allergen Reactions     Codeine         Family and Social History    No family history on file.     Social History   Substance Use Topics     Smoking status: Never Smoker     Smokeless tobacco: Never Used     Alcohol use No        Physical Exam       Physical Exam  /70  Pulse 67  Ht 5' (1.524 m)  Wt 129 lb (58.5 kg)  SpO2 97%  BMI 25.19 kg/m2  General appearance: alert, appears stated age, cooperative and mild distress  Head: Normocephalic, without obvious abnormality, atraumatic  Throat: lips, mucosa, and tongue normal; teeth and gums normal  Neck: no adenopathy, no carotid bruit, no JVD, supple, symmetrical, trachea midline and thyroid not enlarged, symmetric, no tenderness/mass/nodules  Lungs: clear to auscultation bilaterally  Heart: regular rate and rhythm, S1, S2 normal, no murmur, click, rub or gallop  Abdomen: soft, non-tender; bowel sounds normal; no masses,  no organomegaly  Extremities: extremities normal, atraumatic, no cyanosis or edema  Skin: Skin color, texture, turgor normal. No rashes or lesions  Psychiatric: Sad affect and mood     Additional Information        Lamont Kapadia MD  Internal Medicine  Contact me at [phone]     Additional Information   Current Outpatient Prescriptions   Medication Sig     ALPRAZolam (XANAX) 0.25 MG tablet Take 1 tablet (0.25 mg total) by mouth 3 (three) times a day as needed for anxiety.     amLODIPine (NORVASC) 5 MG tablet Take 2 tablets (10 mg total) by mouth daily.     fluticasone (FLONASE) 50 mcg/actuation nasal spray USE 1 SPRAY IN EACH NOSTRIL EVERY DAY; SHAKE BEFORE USE     lamoTRIgine (LAMICTAL) 100 MG tablet TAKE 1 AND 1/2 TABLETS BY MOUTH TWICE DAILY     levothyroxine (SYNTHROID, LEVOTHROID) 88 MCG tablet Take 1 tablet (88 mcg total) by mouth Daily at 6:00 am.     meclizine (ANTIVERT) 25 mg tablet Take 1 tablet (25 mg total) by mouth 3 (three) times a day as needed for dizziness or nausea.     simvastatin (ZOCOR) 20 MG tablet TAKE 1 TABLET BY MOUTH EVERY EVENING     Allergies   Allergen Reactions      Gerald      Social History   Substance Use Topics     Smoking status: Never Smoker     Smokeless tobacco: Never Used     Alcohol use No         Time: total time spent with the patient was 25 minutes of which >50% was spent in counseling and coordination of care

## 2021-06-20 NOTE — LETTER
Letter by Lamont Kapadia MD at      Author: Lamont Kapadia MD Service: -- Author Type: --    Filed:  Encounter Date: 8/24/2020 Status: (Other)         Suzan Ballard  [address]  [place] [address]             August 24, 2020         Dear Ms. Ballard,    Below are the results from your recent visit:    Resulted Orders   HM2(CBC w/o Differential)   Result Value Ref Range    WBC 3.8 (L) 4.0 - 11.0 thou/uL    RBC 4.84 3.80 - 5.40 mill/uL    Hemoglobin 13.2 12.0 - 16.0 g/dL    Hematocrit 39.9 35.0 - 47.0 %    MCV 82 80 - 100 fL    MCH 27.2 27.0 - 34.0 pg    MCHC 33.0 32.0 - 36.0 g/dL    RDW 11.5 11.0 - 14.5 %    Platelets 315 140 - 440 thou/uL    MPV 6.1 (L) 7.0 - 10.0 fL   Lipid Cascade   Result Value Ref Range    Cholesterol 154 <=199 mg/dL    Triglycerides 96 <=149 mg/dL    HDL Cholesterol 56 >=50 mg/dL    LDL Calculated 79 <=129 mg/dL    Patient Fasting > 8hrs? Yes    Thyroid Stimulating Hormone (TSH)   Result Value Ref Range    TSH 0.01 (L) 0.30 - 5.00 uIU/mL   T4, Free   Result Value Ref Range    Free T4 1.3 0.7 - 1.8 ng/dL   Basic Metabolic Panel   Result Value Ref Range    Sodium 142 136 - 145 mmol/L    Potassium 3.7 3.5 - 5.0 mmol/L    Chloride 107 98 - 107 mmol/L    CO2 26 22 - 31 mmol/L    Anion Gap, Calculation 9 5 - 18 mmol/L    Glucose 89 70 - 125 mg/dL    Calcium 9.6 8.5 - 10.5 mg/dL    BUN 17 8 - 22 mg/dL    Creatinine 0.59 (L) 0.60 - 1.10 mg/dL    GFR MDRD Af Amer >60 >60 mL/min/1.73m2    GFR MDRD Non Af Amer >60 >60 mL/min/1.73m2    Narrative    Fasting Glucose reference range is 70-99 mg/dL per  American Diabetes Association (ADA) guidelines.   Hepatic Profile   Result Value Ref Range    Bilirubin, Total 1.6 (H) 0.0 - 1.0 mg/dL    Bilirubin, Direct 0.5 <=0.5 mg/dL    Protein, Total 7.3 6.0 - 8.0 g/dL    Albumin 3.8 3.5 - 5.0 g/dL    Alkaline Phosphatase 92 45 - 120 U/L    AST 25 0 - 40 U/L    ALT 36 0 - 45 U/L       Abnormal thyroid function, please make sure you take your  levothyroxine first thing in the morning with water without food and your other medications. Have this repeated in 6 weeks.please make lab appointment only.     Otherwise, rest of your labs are normal with other insignificant changes of no clinical importance.     Continue same medications. Thanks.     Please call with questions or contact us using [employer].    Sincerely,        Electronically signed by Lamont Kapadia MD

## 2021-06-21 ENCOUNTER — NURSE TRIAGE (OUTPATIENT)
Dept: NURSING | Facility: CLINIC | Age: 66
End: 2021-06-21

## 2021-06-21 NOTE — PROGRESS NOTES
Mental Health Visit Note    2018    Start time: 900    Stop Time: 950   Session # 7    Session Type: Patient is presenting for an Individual session.    Suzan Ballard is a 62 y.o. female is being seen today for    Chief Complaint   Patient presents with      Follow Up     Depression     New symptoms or complaints: Patient's father  last week    Functional Impairment:   Personal: 3  Family: 3  Work: retired  Social:2    Clinical assessment of mental status: The patient was on time for their scheduled session.  They were appropriately dressed with good grooming and hygiene.  The patient was oriented X4.  Patient was pleasant and cooperative.  Mood and affect were mildly depressed and sad.   The patient made appropriate eye contact.  Recent and remote memories were intact.  Thought process was logical and linear.  Speech/language (tone and rate) were normal.  Insight and judgment were adequate.  Patient's mental status was reviewed and remains unchanged from session dated 10/30/18.    Suicidal/Homicidal Ideation present: None Reported This Session    Patient's impression of their current status:  Patient comes to session reporting ongoing sadness secondary to her marital issues.  She reports additional sadness about her father's passing.  She continues to be varying levels of anger and confusion.      Therapist impression of patients current state: Patient's thoughts/feelings were explored and validated.  Normalized patient's emotional reaction to marital issue and now passing of her father.  We continued explore how she is managing and processing her emotions.  Patient appears to be doing well with this.  Affirmed her ability to to do this and no longer feeling overwhelmed and lost in her emotions.  Continued e/o week sessions to help with adjustment and processing.      Type of psychotherapeutic technique provided: Client centered and CBT    Progress toward short term goals: Progress as expected per  patient report and observation of her processing during session.     Review of long term goals: Treatment plan update/review is due 1/2019    Diagnosis:   1. Adjustment disorder with mixed anxiety and depressed mood        Plan and Follow up: 1.  Patient to schedule for continued follow up psychotherapy appointments.    2.  Patient will use their crisis plan and/or access crisis services should symptoms       become worse.      3.  Patient to remain medication compliant.  4.  Patient to abstain from drugs and alcohol.  5.  Practice assertiveness.     Discharge Criteria/Planning: Patient will continue with follow-up until therapy can be discontinued without return of signs and symptoms.    Yoni Remy

## 2021-06-21 NOTE — TELEPHONE ENCOUNTER
"Pt reports L hand has been \"hurting for awhile now\". Pt reports numbness occurs at times but is able to feel sensation in the hand and reports \"just doesn't feel right\". Pt reports a month ago symptoms started \"woke up and couldn't move hand and it's hurting all day\". Pt reports \"I can move my hand now\". Pt denies swelling, redness or other acute symptoms. Pt rates pain \"mild\" currently and reports pain occasional goes up forearm. Pt is not aware of injuring hand or arm. Pt states she is able use hand normally but \"middle finger is the worst, try not to use that\". Pt has history of carpal tunnel. Pt denies chest pain, shortness of breath or other acute symptoms.    Advised pt she can use Tylenol 1,000mg every eight hours for pain and heating pad for ten minutes three times daily for discomfort. Call back if worsening or new symptoms. See PCP within three days per protocol.     Pt verbalizes understanding and agrees to plan.     Reason for Disposition    Patient wants to be seen    Additional Information    Negative: Similar pain previously and it was from 'heart attack'    Negative: Similar pain previously from 'angina' and not relieved by nitroglycerin    Negative: Sounds like a life-threatening emergency to the triager    Negative: Followed a hand or wrist injury    Negative: Chest pain    Negative: Caused by an animal bite    Negative: Wound looks infected    Negative: Fever and red area (or area very tender to touch)    Negative: Fever and swollen joint    Negative: Patient sounds very sick or weak to the triager    Negative: SEVERE pain (e.g., excruciating, unable to use hand at all)    Negative: Red area or streak and large (> 2 in or 5 cm)    Negative: Weakness (i.e., loss of strength) of new onset in hand or fingers (Exception: not truly weak, hand feels weak because of pain)    Negative: Numbness (i.e., loss of sensation) of new onset in hand or fingers (Exception: slight tingling; numbness present > 2 " weeks)    Negative: Looks like a boil, infected sore, deep ulcer, or other infected rash (spreading redness, pus)    Negative: Localized rash is very painful (no fever)    Protocols used: HAND AND WRIST PAIN-A-[place]

## 2021-06-21 NOTE — PROGRESS NOTES
Mental Health Visit Note    10/11/2018    Start time: 1200    Stop Time: 1255   Session # 3    Session Type: Patient is presenting for an Individual session.    Suzan Ballard is a 62 y.o. female is being seen today for    Chief Complaint   Patient presents with      Follow Up     Anxiety     Depression     New symptoms or complaints: None    Functional Impairment:   Personal: 3  Family: 3  Work: retired  Social:2    Clinical assessment of mental status: The patient was on time for their scheduled session.  They were appropriately dressed with good grooming and hygiene.  The patient was oriented X4.  Patient was pleasant and cooperative.  Mood and affect were sad.   The patient made appropriate eye contact.  Recent and remote memories were intact.  Thought process was logical and linear.  Speech/language (tone and rate) were normal.  Insight and judgment were adequate.  Patient's mental status was reviewed and remains unchanged from session on 9/25/18.    Suicidal/Homicidal Ideation present: None Reported This Session    Patient's impression of their current status:  Patient comes to session identifying anxiety as her main issue that is related to the relationship problem she is having with her spouse.  She specifically reports feelings of confusion, guilt, sadness and mild anger.  She is beginning to talk with her family more about this, however still doesn't with her friends.       Therapist impression of patients current state: Patient's thoughts/feelings were explored and validated.  Collaborated with patient in treatment planning, identifying acceptance of changes and decreasing her anxiety as main goals.  Patient has already improved in just the few weeks she has been coming.  Having space to process her thoughts/feelings appears to have been helpful.  Highlighting the patient's competencies in managing this stress is important as well as helping her access her resources of family and friends.     Type of  psychotherapeutic technique provided: Client centered and CBT    Progress toward short term goals: Progress as expected as evidenced by patient's continued engagement and willingness to explore in session.  Patient is already indicating a benefit and improvement.      Review of long term goals: Treatment Plan created, review/update will be due on 1/11/2019.    Diagnosis:   1. Adjustment disorder with mixed anxiety and depressed mood        Plan and Follow up: 1.  Patient to schedule for continued follow up psychotherapy appointments.    2.  Patient will use their crisis plan and/or access crisis services should symptoms       become worse.      3.  Patient to remain medication compliant.  4.  Patient to abstain from drugs and alcohol.  5.  Continue journal of thoughts/feelings      Discharge Criteria/Planning: Patient will continue with follow-up until therapy can be discontinued without return of signs and symptoms.    Yoni Remy

## 2021-06-21 NOTE — LETTER
Letter by Lamont Kapadia MD at      Author: Lamont Kapadia MD Service: -- Author Type: --    Filed:  Encounter Date: 11/19/2020 Status: (Other)         Suzan Ballard  [address]  [place] [address]             November 19, 2020         Dear Ms. Ballard,    Below are the results from your recent visit:    Resulted Orders   HM2(CBC w/o Differential)   Result Value Ref Range    WBC 3.7 (L) 4.0 - 11.0 thou/uL    RBC 5.33 3.80 - 5.40 mill/uL    Hemoglobin 13.9 12.0 - 16.0 g/dL    Hematocrit 44.1 35.0 - 47.0 %    MCV 83 80 - 100 fL    MCH 26.1 (L) 27.0 - 34.0 pg    MCHC 31.5 (L) 32.0 - 36.0 g/dL    RDW 13.2 11.0 - 14.5 %    Platelets 214 140 - 440 thou/uL    MPV 10.3 8.5 - 12.5 fL   Lipid Cascade   Result Value Ref Range    Cholesterol 166 <=199 mg/dL    Triglycerides 62 <=149 mg/dL    HDL Cholesterol 72 >=50 mg/dL    LDL Calculated 82 <=129 mg/dL    Patient Fasting > 8hrs? Yes    Basic Metabolic Panel   Result Value Ref Range    Sodium 143 136 - 145 mmol/L    Potassium 3.6 3.5 - 5.0 mmol/L    Chloride 107 98 - 107 mmol/L    CO2 25 22 - 31 mmol/L    Anion Gap, Calculation 11 5 - 18 mmol/L    Glucose 101 70 - 125 mg/dL    Calcium 9.2 8.5 - 10.5 mg/dL    BUN 15 8 - 22 mg/dL    Creatinine 0.58 (L) 0.60 - 1.10 mg/dL    GFR MDRD Af Amer >60 >60 mL/min/1.73m2    GFR MDRD Non Af Amer >60 >60 mL/min/1.73m2    Narrative    Fasting Glucose reference range is 70-99 mg/dL per  American Diabetes Association (ADA) guidelines.   Hepatic Profile   Result Value Ref Range    Bilirubin, Total 1.4 (H) 0.0 - 1.0 mg/dL    Bilirubin, Direct 0.4 <=0.5 mg/dL    Protein, Total 7.4 6.0 - 8.0 g/dL    Albumin 4.2 3.5 - 5.0 g/dL    Alkaline Phosphatase 117 45 - 120 U/L    AST 15 0 - 40 U/L    ALT 14 0 - 45 U/L   Vitamin D, Total (25-Hydroxy)   Result Value Ref Range    Vitamin D, Total (25-Hydroxy) 30.9 30.0 - 80.0 ng/mL    Narrative    Deficiency <10.0 ng/mL  Insufficiency 10.0-29.9 ng/mL  Sufficiency 30.0-80.0 ng/mL  Toxicity  (possible) >100.0 ng/mL   Thyroid Stimulating Hormone (TSH)   Result Value Ref Range    TSH 1.62 0.30 - 5.00 uIU/mL   T4, Free   Result Value Ref Range    Free T4 1.2 0.7 - 1.8 ng/dL       Normal all labs specifically your lipids. Some insignificant changes of no clinical importance. Good labs!    Continue same medications. Thanks.     Please call with questions or contact us using [employer].    Sincerely,        Electronically signed by Lamont Kapadia MD

## 2021-06-21 NOTE — PROGRESS NOTES
Mental Health Visit Note    10/23/2018    Start time: 900    Stop Time: 955   Session # 5    Session Type: Patient is presenting for an Individual session.    Suzan Ballard is a 62 y.o. female is being seen today for    Chief Complaint   Patient presents with     MH Follow Up     Depression     New symptoms or complaints: None    Functional Impairment:   Personal: 3  Family: 3  Work: retired  Social:2    Clinical assessment of mental status: The patient was on time for their scheduled session.  They were appropriately dressed with good grooming and hygiene.  The patient was oriented X4.  Patient was pleasant and cooperative.  Mood and affect were sad.   The patient made appropriate eye contact.  Recent and remote memories were intact.  Thought process was logical and linear.  Speech/language (tone and rate) were normal.  Insight and judgment were adequate.  Mental Status was reviewed and remains unchanged from session dated 9/25/18.    Suicidal/Homicidal Ideation present: None Reported This Session    Patient's impression of their current status:  Patient comes to session ongoing sadness and confusion regarding her relationship issues with her .  This is effecting her social activity and overall mood.      Therapist impression of patients current state: Patient's thoughts/feelings were explored and validated.  We explored stages of grief as patient contemplates separation, patient appearing to be in bargainning/anger stage.  We explored boundary setting and assertive communication as patient perceives boundary setting as mean.      Type of psychotherapeutic technique provided: Client centered and CBT    Progress toward short term goals: Progress as expected per patient report and observation of her processing during session.     Review of long term goals: Treatment plan update/review is due 1/2019    Diagnosis:   1. Adjustment disorder with mixed anxiety and depressed mood        Plan and Follow up: 1.   Patient to schedule for continued follow up psychotherapy appointments.    2.  Patient will use their crisis plan and/or access crisis services should symptoms       become worse.      3.  Patient to remain medication compliant.  4.  Patient to abstain from drugs and alcohol.  5.  Practice assertiveness.       Discharge Criteria/Planning: Patient will continue with follow-up until therapy can be discontinued without return of signs and symptoms.    Yoni Remy

## 2021-06-21 NOTE — PROGRESS NOTES
Mental Health Visit Note    10/17/2018    Start time: 800    Stop Time: 855   Session # 4    Session Type: Patient is presenting for an Individual session.    Suzan Ballard is a 62 y.o. female is being seen today for    Chief Complaint   Patient presents with      Follow Up     Anxiety     Depression     New symptoms or complaints: None    Functional Impairment:   Personal: 3  Family: 3  Work: retired  Social:2    Clinical assessment of mental status: The patient was on time for their scheduled session.  They were appropriately dressed with good grooming and hygiene.  The patient was oriented X4.  Patient was pleasant and cooperative.  Mood and affect were sad.   The patient made appropriate eye contact.  Recent and remote memories were intact.  Thought process was logical and linear.  Speech/language (tone and rate) were normal.  Insight and judgment were adequate.  Patient's mental status was reviewed and remains unchanged from session on 10/11/18.    Suicidal/Homicidal Ideation present: None Reported This Session    Patient's impression of their current status:  Patient comes to session indicating a feeling that her anxiety and depression are improving.  She indicates gaining more clarity over what it is she needs to do.  She reports not needing Xanax for her anxiety for the past couple weeks.  She reports practicing her breathing and other strategies to reduce her anxiety as opposed to relying on medication.  Some sadness still does exist.     Therapist impression of patients current state: Patient's thoughts/feelings were explored and validated.  Normalized the patient's experience and feelings.  We reviewed stages of grief.  Practiced identifying feelings with patient and her associated thoughts.  Utilized thought distortion handout and process of challenging/adapting the thought.  Patient appears willing and motivated to practice this.  Prognosis remains good.     Type of psychotherapeutic technique  provided: Client centered and CBT    Progress toward short term goals: Progress as expected as evidenced by patient's reported improvement in mood and willingness to use strategies as opposed to medications to reduce anxiety.     Review of long term goals: Treatment Plan review/update will be due on 1/11/2019.    Diagnosis:   1. Adjustment disorder with mixed anxiety and depressed mood        Plan and Follow up: 1.  Patient to schedule for continued follow up psychotherapy appointments.    2.  Patient will use their crisis plan and/or access crisis services should symptoms       become worse.      3.  Patient to remain medication compliant.  4.  Patient to abstain from drugs and alcohol.  5.  Continue journal of thoughts/feelings      Discharge Criteria/Planning: Patient will continue with follow-up until therapy can be discontinued without return of signs and symptoms.    Yoni Remy

## 2021-06-21 NOTE — PROGRESS NOTES
Mental Health Visit Note    10/30/2018    Start time: 900    Stop Time: 955   Session # 6    Session Type: Patient is presenting for an Individual session.    Suzan Ballard is a 62 y.o. female is being seen today for    Chief Complaint   Patient presents with     MH Follow Up     Anxiety     Depression     New symptoms or complaints: None    Functional Impairment:   Personal: 3  Family: 3  Work: retired  Social:2    Clinical assessment of mental status: The patient was on time for their scheduled session.  They were appropriately dressed with good grooming and hygiene.  The patient was oriented X4.  Patient was pleasant and cooperative.  Mood and affect were mildly depressed and sad.   The patient made appropriate eye contact.  Recent and remote memories were intact.  Thought process was logical and linear.  Speech/language (tone and rate) were normal.  Insight and judgment were adequate.      Suicidal/Homicidal Ideation present: None Reported This Session    Patient's impression of their current status:  Patient comes to session with continued sadness that she says now comes and goes.  She says the same is true for her confusion about the situation.  She recalls a recent phone conversation she and her  had and wants to process it.      Therapist impression of patients current state: Patient's thoughts/feelings were explored and validated.  We began to explore the possibility of establishing boundaries and expectations.  While patient may not move forward with legal divorce/separation, we talked about the benefit of having a defined relationship so she can begin to take charge and have some control/ownership over what is happening.      Type of psychotherapeutic technique provided: Client centered and CBT    Progress toward short term goals: Progress as expected per patient report and observation of her processing during session.     Review of long term goals: Treatment plan update/review is due  1/2019    Diagnosis:   1. Adjustment disorder with mixed anxiety and depressed mood        Plan and Follow up: 1.  Patient to schedule for continued follow up psychotherapy appointments.    2.  Patient will use their crisis plan and/or access crisis services should symptoms       become worse.      3.  Patient to remain medication compliant.  4.  Patient to abstain from drugs and alcohol.  5.  Practice assertiveness.   6.  Consider developing a separation agreement.       Discharge Criteria/Planning: Patient will continue with follow-up until therapy can be discontinued without return of signs and symptoms.    Yoni Remy

## 2021-06-22 NOTE — PROGRESS NOTES
Mental Health Visit Note    1/3/2019    Start time: 1400    Stop Time: 1455   Session # 1    Session Type: Patient is presenting for an Individual session.    Suzan Ballard is a 63 y.o. female is being seen today for    Chief Complaint   Patient presents with     MH Follow Up     Depression     New symptoms or complaints: rebound of sadness    Functional Impairment:   Personal: 3  Family: 4  Work: retired  Social:2    Clinical assessment of mental status: The patient was on time for their scheduled session.  They were appropriately dressed with good grooming and hygiene.  The patient was oriented X4.  Patient was pleasant and cooperative.  Mood and affect were sad and congruent with the content of her speech.   The patient made appropriate eye contact.  Recent and remote memories were intact.  Thought process was logical and linear.  Speech/language (tone and rate) were normal.  Insight and judgment were adequate.    Suicidal/Homicidal Ideation present: None Reported This Session    Patient's impression of their current status:  Patient comes to session saying that she believes she is doing despite her sad presentation today.  She acknowledges that conversations with her  that she is now  from are upsetting to her as he doesn't seem to understand how he is hurting her.      Therapist impression of patients current state: Patient's thoughts/feelings were explored and validated.  We reviewed together the stages of grief and patient's experience was normalized in this context.  We explored ways to establish boundaries with her  and stuck points in acting on implementing those boundaries.      Type of psychotherapeutic technique provided: Client centered and CBT    Progress toward short term goals:Progress as expected, as evidenced by patient's practice of breathing, engagement with others and utlizing journaling.     Review of long term goals: Date of last review 10/2/2018    Diagnosis:   1.  Adjustment disorder with mixed anxiety and depressed mood        Plan and Follow up: 1.  Patient to schedule for continued follow up psychotherapy appointments.    2.  Patient will use their crisis plan and/or access crisis services should symptoms       become worse.      3.  Patient to remain medication compliant.  4.  Patient to abstain from drugs and alcohol.      Discharge Criteria/Planning: Patient will continue with follow-up until therapy can be discontinued without return of signs and symptoms.    Yoni Remy

## 2021-06-22 NOTE — PROGRESS NOTES
Dear Dr. Kapadia, Lamont GARCIA Md  [address] [facility] [address]  [place] [address]    Thank you for the opportunity to participate in the care of Ms. Suzan Ballard.    She is a 63 y.o. female who comes to the clinic for the transfer of care for obstructive sleep apnea.  The patient was diagnosed with severe obstructive sleep apnea on 10/29/2013 from [facility].  Her apnea hypopnea index was grossly elevated at 55 events per hour with the lowest O2 sat of 85%.  We will scanned the sleep study in the patient's media section.  The patient states that the past year [employer] CPAP machine has been making a loud noise making it difficult for her to use.  As a result she continues to suffer from excessive daytime sleepiness as well as loud snoring.  She would like to switch over her durable medical equipment care to [place] to get a new CPAP machine.     Ideal Sleep-Wake Cycle(devoid of societal pressure):    Patient would try to initiate sleep at around 10 PM with a sleep latency of 1 hour. The patient would have 2 nocturnal awakening. Final wake up time is around 5 AM.    Past Medical History  Past Medical History:   Diagnosis Date     History of meningioma     causes seizures     Hypertension         Past Surgical History  No past surgical history on file.     Meds  Current Outpatient Medications   Medication Sig Dispense Refill     ALPRAZolam (XANAX) 0.25 MG tablet Take 1 tablet (0.25 mg total) by mouth 3 (three) times a day as needed for anxiety. 30 tablet 0     amLODIPine (NORVASC) 5 MG tablet Take 2 tablets (10 mg total) by mouth daily. 180 tablet 3     fluticasone (FLONASE) 50 mcg/actuation nasal spray USE 1 SPRAY IN EACH NOSTRIL EVERY DAY; SHAKE BEFORE USE 16 g 3     lamoTRIgine (LAMICTAL) 100 MG tablet TAKE 1 AND 1/2 TABLETS BY MOUTH TWICE DAILY 270 tablet 3     levothyroxine (SYNTHROID, LEVOTHROID) 88 MCG tablet Take 1 tablet (88 mcg total) by mouth Daily at 6:00 am. 180 tablet 1     meclizine (ANTIVERT) 25 mg tablet  Take 1 tablet (25 mg total) by mouth 3 (three) times a day as needed for dizziness or nausea. 30 tablet 4     simvastatin (ZOCOR) 20 MG tablet TAKE 1 TABLET BY MOUTH EVERY EVENING 90 tablet 0     No current facility-administered medications for this visit.         Allergies  Codeine     Social History  Social History     Socioeconomic History     Marital status:      Spouse name: Not on file     Number of children: Not on file     Years of education: Not on file     Highest education level: Not on file   Social Needs     Financial resource strain: Not on file     Food insecurity - worry: Not on file     Food insecurity - inability: Not on file     Transportation needs - medical: Not on file     Transportation needs - non-medical: Not on file   Occupational History     Not on file   Tobacco Use     Smoking status: Never Smoker     Smokeless tobacco: Never Used   Substance and Sexual Activity     Alcohol use: No     Drug use: No     Sexual activity: Not on file   Other Topics Concern     Not on file   Social History Narrative     Not on file        Family History  No family history on file.     Review of Systems:  Constitutional: Negative except as noted in HPI.   Eyes: Negative except as noted in HPI.   ENT: Negative except as noted in HPI.   Cardiovascular: Negative except as noted in HPI.   Respiratory: Negative except as noted in HPI.   Gastrointestinal: Negative except as noted in HPI.   Genitourinary: Negative except as noted in HPI.   Musculoskeletal: Negative except as noted in HPI.   Integumentary: Negative except as noted in HPI.   Neurological: Negative except as noted in HPI.   Psychiatric: Negative except as noted in HPI.   Endocrine: Negative except as noted in HPI.   Hematologic/Lymphatic: Negative except as noted in HPI.      STOP BANG 12/6/2018   Do you snore loudly (louder than talking or loud enough to be heard through closed doors)? 1   Do you often feel tired, fatigued, or sleepy during  daytime? 1   Has anyone observed you stop breathing in your sleep? 0   Do you have or are you being treated for high blood pressure? 1   BMI more than 35 kg/m2 0   Age over 50 years old? 1   Neck circumference greater than 16 inches? 0   Gender male? 0   Total Score 4   Epworths Sleepiness Scale 12/6/2018   Sitting and reading 1   Watching TV 1   Sitting, inactive in a public place (e.g. a theatre or a meeting) 0   As a passenger in a car for an hour without a break 1   Lying down to rest in the afternoon when circumstances permit 1   Sitting and talking to someone 0   Sitting quietly after a lunch without alcohol 1   In a car, while stopped for a few minutes in traffic 0   Total score 5   Rooming 12/6/2018   Usual bedtime 10pm   Sleep Latency 1 minute   Awakenings 2 times   Wake Up Time 5am   Weekends same   Difficulty falling asleep Yes   Difficulty staying asleep Yes   Excessive daytime tiredness Yes   Excessive daytime sleepiness No   Dozing off while driving No   Shift Worker No   Sleep Walking? No   Sleep Talking? No   Kicking or punching? No   Restless legs symptoms No       Physical Exam:  /76   Pulse 67   Ht 5' (1.524 m)   Wt 127 lb (57.6 kg)   SpO2 98%   BMI 24.80 kg/m    BMI:Body mass index is 24.8 kg/m .   GEN: NAD, appropriate for age  Head: Normocephalic.  EYES: PERRLA, EOMI  ENT: Oropharynx is clear, mallampatti class 4+ airway.   Nasal mucosa is moist without erythema  Neck : Thyroid is within normal limits.  Neck circumference 13.75 inches  CV: Regular rate and rhythm, S1 & S2 positive.  LUNGS: Bilateral breathsounds heard.   ABDOMEN: Positive bowel sounds in all quadrants, soft, no rebound or guarding  MUSCULOSKELETAL: Bilateral trace leg swelling  SKIN: warm, dry, no rashes  Neurological: Alert, oriented to time, place, and person.  Psych: normal mood, normal affect     Labs/Studies:     Lab Results   Component Value Date    WBC 3.7 (L) 06/04/2018    HGB 14.0 06/04/2018    HCT 42.6  06/04/2018    MCV 81 06/04/2018     06/04/2018         Chemistry        Component Value Date/Time     09/03/2018 2145    K 3.6 09/03/2018 2145     (H) 09/03/2018 2145    CO2 25 09/03/2018 2145    BUN 15 09/03/2018 2145    CREATININE 0.68 09/03/2018 2145    GLU 97 09/03/2018 2145        Component Value Date/Time    CALCIUM 9.7 09/03/2018 2145    ALKPHOS 104 06/04/2018 0842    AST 14 06/04/2018 0842    ALT 14 06/04/2018 0842    BILITOT 1.7 (H) 06/04/2018 0842            No results found for: FERRITIN  Lab Results   Component Value Date    TSH 0.82 06/04/2018         Assessment and Plan:  In summary Suzan Ballard is a 63 y.o. year old female here for sleep disturbance.  1.  Obstructive sleep apnea on CPAP  Due to the fact that the patient has her sleep study here in clinic I will therefore write a prescription for her to receive a new CPAP machine as per her request [employer].  I strongly advised her to bring her new CPAP machine with her on the next clinical visit with me so that we can make adjustments as needed.  2.  Hypersomnia  3.  Other sleep disturbance    Patient verbalized understanding of these issues, agrees with the plan and all questions were answered today. Patient was given an opportuntity to voice any other symptoms or concerns not listed above. Patient did not have any other symptoms or concerns.         Jai Morris DO  Board Certified in Internal Medicine and Sleep Medicine  [facility].    (Note created with Dragon voice recognition and unintended spelling errors and word substitutions may occur)

## 2021-06-22 NOTE — PROGRESS NOTES
Mental Health Visit Note    12/18/2018    Start time: 900    Stop Time: 955       Session Type: Patient is presenting for an Individual session.    Suzan Ballard is a 63 y.o. female is being seen today for    Chief Complaint   Patient presents with     MH Follow Up     Anxiety     Depression     New symptoms or complaints: None    Functional Impairment:   Personal: 3  Family: 3  Work: retired  Social:3    Clinical assessment of mental status: The patient was on time for their scheduled session.  They were appropriately dressed with good grooming and hygiene.  The patient was oriented X4.  Patient was pleasant and cooperative.  Mood and affect were sad, tearful and congruent with the content of her speech.   The patient made appropriate eye contact.  Recent and remote memories were intact.  Thought process was logical and linear.  Speech/language (tone and rate) were normal.  Insight and judgment were adequate.    Suicidal/Homicidal Ideation present: None Reported This Session    Patient's impression of their current status:  Patient comes to session afraid she has gone backwards as she has been tearful again.      Therapist impression of patients current state: Patient's thoughts/feelings were explored and validated.  Reviewed stages of grief and normalized patient's reactions.  Affirmed the patient's right to have emotions/feelings.  Encouraged continued practice of breathing exercises and journaling, both of which patient says is helpful.  Began to explore committed actions of help/care towards herself.     Type of psychotherapeutic technique provided: Client centered and ACT    Progress toward short term goals:Progress as expected, as evidenced in patient's practice of strategies and willingness to continues exploring uncomfortable subjects.     Review of long term goals: Not done at today's visit    Diagnosis:   1. Adjustment disorder with mixed anxiety and depressed mood        Plan and Follow up: 1.  Patient  to schedule for continued follow up psychotherapy appointments.    2.  Patient will use their crisis plan and/or access crisis services should symptoms       become worse.      3.  Patient to remain medication compliant.  4.  Patient to abstain from drugs and alcohol.      Discharge Criteria/Planning: Patient will continue with follow-up until therapy can be discontinued without return of signs and symptoms.    Yoni Remy 12/18/2018

## 2021-06-22 NOTE — PROGRESS NOTES
Office Visit - Follow Up   Suzan Ballard   63 y.o. female    Date of Visit: 2019    Chief Complaint   Patient presents with     Letter for School/Work     needs to speak with MD about composing a letter         Assessment and Plan   1. Encounter for counseling  Came primarily for counseling regarding her mother who is in the [facility].  Mother who is also my patient wants to travel back to the [facility] but she needs a travel  to help or assist her on this trip.  Will need a letter from me stating her need of a travel  for this trip to the [place]. One of the daughters  is  designated to travel with her.  Mother was in the [facility] for a few months with her  but her   recently in the [facility].  Will  compose a letter regarding this subject.    2. Essential hypertension  Controlled.  Continue same medication, amlodipine    3. Mixed hyperlipidemia  Controlled.  Last lipids were good.  Continue simvastatin.    4. Acquired hypothyroidism  Well supplemented with levothyroxine.      Follow up  as previously scheduled.     History of Present Illness   This 63 y.o. old female here for follow-up.  Primarily also to get a letter from me stating that her mother needs a travel  on her trip back to the [place].  Mother needs assistance or help in her activities of daily living and other personal chores due to her advanced age, several medical conditions and frailty.  Was in the [facility] with her  where they stayed for a few months to visit family.  But her  recently  so she is all by herself to travel back to the [place].  Has hypertension and hyperlipemia controlled by her medications.  Has hypothyroidism well supplemented by levothyroxine.  Overall, feels well.  No complaints    Review of Systems   A 12 point comprehensive review of systems was negative except as noted..     Medications, Allergies and Problem List   Reviewed  and updated             Chief Complaint   Letter for School/Work (needs to speak with MD about composing a letter )       Patient Profile   Social History     Social History Narrative     Not on file        Past Medical History   Patient Active Problem List   Diagnosis     Meningioma     Peripheral Neuropathy     Hypothyroidism     Hyperlipidemia     Carpal Tunnel Syndrome     Essential Hypertension     Osteopenia     Seizure disorder (H)     Osteoarthritis of right knee     GERD (gastroesophageal reflux disease)     Allergic rhinitis     Essential hypertension     Hyperlipidemia     Hypothyroid     Cooper's neuroma       Past Surgical History  She has no past surgical history on file.       Medications and Allergies   Current Outpatient Medications   Medication Sig     ALPRAZolam (XANAX) 0.25 MG tablet Take 1 tablet (0.25 mg total) by mouth 3 (three) times a day as needed for anxiety.     amLODIPine (NORVASC) 5 MG tablet Take 2 tablets (10 mg total) by mouth daily.     fluticasone (FLONASE) 50 mcg/actuation nasal spray USE 1 SPRAY IN EACH NOSTRIL EVERY DAY; SHAKE BEFORE USE.     ibuprofen (ADVIL,MOTRIN) 800 MG tablet Take 1 tablet (800 mg total) by mouth every 8 (eight) hours as needed for pain.     lamoTRIgine (LAMICTAL) 100 MG tablet TAKE 1 AND 1/2 TABLETS BY MOUTH TWICE DAILY     levothyroxine (SYNTHROID, LEVOTHROID) 88 MCG tablet Take 1 tablet (88 mcg total) by mouth Daily at 6:00 am.     meclizine (ANTIVERT) 25 mg tablet Take 1 tablet (25 mg total) by mouth 3 (three) times a day as needed for dizziness or nausea.     simvastatin (ZOCOR) 20 MG tablet Take 1 tablet (20 mg total) by mouth every evening.     Allergies   Allergen Reactions     Codeine         Family and Social History   No family history on file.     Social History     Tobacco Use     Smoking status: Never Smoker     Smokeless tobacco: Never Used   Substance Use Topics     Alcohol use: No     Drug use: No        Physical Exam       Physical  Exam  /72   Pulse 78   Ht 5' (1.524 m)   Wt 126 lb (57.2 kg)   SpO2 98%   BMI 24.61 kg/m    General appearance: alert, appears stated age, cooperative and no distress  Head: Normocephalic, without obvious abnormality, atraumatic  Throat: lips, mucosa, and tongue normal; teeth and gums normal  Neck: no adenopathy, no carotid bruit, no JVD, supple, symmetrical, trachea midline and thyroid not enlarged, symmetric, no tenderness/mass/nodules  Lungs: clear to auscultation bilaterally  Heart: regular rate and rhythm, S1, S2 normal, no murmur, click, rub or gallop  Abdomen: soft, non-tender; bowel sounds normal; no masses,  no organomegaly  Extremities: extremities normal, atraumatic, no cyanosis or edema  Skin: Skin color, texture, turgor normal. No rashes or lesions     Additional Information        Lamont Kapadia MD  Internal Medicine  Contact me at [phone]     Additional Information   Current Outpatient Medications   Medication Sig     ALPRAZolam (XANAX) 0.25 MG tablet Take 1 tablet (0.25 mg total) by mouth 3 (three) times a day as needed for anxiety.     amLODIPine (NORVASC) 5 MG tablet Take 2 tablets (10 mg total) by mouth daily.     fluticasone (FLONASE) 50 mcg/actuation nasal spray USE 1 SPRAY IN EACH NOSTRIL EVERY DAY; SHAKE BEFORE USE.     ibuprofen (ADVIL,MOTRIN) 800 MG tablet Take 1 tablet (800 mg total) by mouth every 8 (eight) hours as needed for pain.     lamoTRIgine (LAMICTAL) 100 MG tablet TAKE 1 AND 1/2 TABLETS BY MOUTH TWICE DAILY     levothyroxine (SYNTHROID, LEVOTHROID) 88 MCG tablet Take 1 tablet (88 mcg total) by mouth Daily at 6:00 am.     meclizine (ANTIVERT) 25 mg tablet Take 1 tablet (25 mg total) by mouth 3 (three) times a day as needed for dizziness or nausea.     simvastatin (ZOCOR) 20 MG tablet Take 1 tablet (20 mg total) by mouth every evening.     Allergies   Allergen Reactions     Codeine      Social History     Tobacco Use     Smoking status: Never Smoker     Smokeless  tobacco: Never Used   Substance Use Topics     Alcohol use: No     Drug use: No         Time: total time spent with the patient was 25 minutes of which >50% was spent in counseling and coordination of care

## 2021-06-22 NOTE — PROGRESS NOTES
Order for Durable Medical Equipment was processed and equipment ordered.     DME provider: Jorge    Date Faxed: 12/6/18    Ordering Provider: Dr. Morris    Equipment ordered: APAP    Fax Number: in house DME/[facility]

## 2021-06-22 NOTE — PROGRESS NOTES
Mental Health Visit Note    10/2/2018    Start time: 900    Stop Time: 955   Session # 3    Session Type: Patient is presenting for an Individual session.    Suzan Ballard is a 63 y.o. female is being seen today for    Chief Complaint   Patient presents with     MH Follow Up     Anxiety     Depression     New symptoms or complaints: None    Functional Impairment:   Personal: 3  Family: 4  Work: retired  Social:2    Clinical assessment of mental status: The patient was on time for their scheduled session.  They were appropriately dressed with good grooming and hygiene.  The patient was oriented X3.  Patient was pleasant and cooperative.  Mood and affect were depressed, sad and congruent with the content of her speech.   The patient made appropriate eye contact.  Recent and remote memories were intact.  Thought process was logical and linear.  Speech/language (tone and rate) were normal.  Insight and judgment were adequate.    Suicidal/Homicidal Ideation present: None Reported This Session    Patient's impression of their current status: Patient comes to session reporting improved mood issues, she indicates reduced feelings of sad overall although she can be triggered at certain times.  She continues to have worries about next steps and some judgement around a potential separation/divorce.       Therapist impression of patients current state: Patient's thoughts/feelings were explored and validated.  Collaborated with patient on her treatment plan which includes goals to reduce her anxiety and adjusting to separation from her .  Patient is responding well to sessions, remaining willing and motivated.  It seems that she is going to benefit from having support as she naturally progresses through stages of grief.      Type of psychotherapeutic technique provided: Client centered and MI    Progress toward short term goals:Progress as expected, as evidenced in engagement in treatment planning and reduced anxiety at  home.     Review of long term goals: Treatment Plan updated    Diagnosis:   1. Adjustment disorder with mixed anxiety and depressed mood        Plan and Follow up: 1.  Patient to schedule for continued follow up psychotherapy appointments.    2.  Patient will use their crisis plan and/or access crisis services should symptoms       become worse.      3.  Patient to remain medication compliant.  4.  Patient to abstain from drugs and alcohol.      Discharge Criteria/Planning: Patient will continue with follow-up until therapy can be discontinued without return of signs and symptoms.    Yoni Remy

## 2021-06-22 NOTE — PROGRESS NOTES
Office Visit - Follow Up   Suzan Ballard   63 y.o. female    Date of Visit: 12/12/2018    Chief Complaint   Patient presents with     Follow-up     fasting, seeing therapist every 2 weeks now     Back Pain     increased upper back pain along shoulder blades     Immunizations     prevnar 13        Assessment and Plan   1. Essential hypertension  Controlled.  Continue amlodipine.  Check CBC and basic metabolic panel.  - amLODIPine (NORVASC) 5 MG tablet; Take 2 tablets (10 mg total) by mouth daily.  Dispense: 180 tablet; Refill: 3  - HM2(CBC w/o Differential)  - Basic Metabolic Panel    2. Adjustment disorder with anxious mood  Still anxious and may be a bit depressed.  Seeing clinical therapist  for this..  Due to marital problems.  Her  has infidelities.  Thinking of  and finally  her  but she is not sure yet.  Continue to to see clinical therapist now every 2 weeks.  Continue alprazolam as needed.  Does not feel she needs antidepressant at this point.  - ALPRAZolam (XANAX) 0.25 MG tablet; Take 1 tablet (0.25 mg total) by mouth 3 (three) times a day as needed for anxiety.  Dispense: 30 tablet; Refill: 0    3. Hyperlipidemia  Controlled.  Continue simvastatin.  Last lipids were good.  LDL 80, HDL 55, triglycerides 89 and total cholesterol 153 on 6/4/2018.  He is fasting today.  Check lipids and liver function.  - simvastatin (ZOCOR) 20 MG tablet; Take 1 tablet (20 mg total) by mouth every evening.  Dispense: 90 tablet; Refill: 3  - Lipid Cascade  - Hepatic Profile    4. Allergic rhinitis  Allergic rhinitis comes and goes.  Takes Flonase in each nostril daily as needed.  - fluticasone (FLONASE) 50 mcg/actuation nasal spray; USE 1 SPRAY IN EACH NOSTRIL EVERY DAY; SHAKE BEFORE USE.  Dispense: 16 g; Refill: 3    5. Seizure disorder (H)  Has history of seizure disorder due to meningioma status post excision.  Now on prophylactic Lamictal.    6. Meningioma  Status post excision for  meningioma.  Had recurrence.  Now being monitored by neurosurgery at the [facility].  May need CyberKnife.    7. Need for pneumococcal vaccine  Due for Prevnar 13.  Was given today.  - Pneumococcal conjugate vaccine 13-valent 6wks-17yrs; >50yrs      Follow up in 3 months.     History of Present Illness   This 63 y.o. old female here for follow-up.  He has adjustment disorder with anxiety moods because of marital problems with her .  Found her  to be playing around with women in the [employer].   is in the [facility].  Seeing a clinical therapist now for her anxiety disorder  bordering to depression.  Has hypertension and hyperlipidemia controlled by her medications.  Seizure disorder due to meningioma status post excision.  Meningioma is being monitored and followed by neurosurgery because it has recurred but still very small.  May need CyberKnife in the future.  Due for Prevnar 13.    Review of Systems   A 12 point comprehensive review of systems was negative except as noted..     Medications, Allergies and Problem List   Reviewed and updated             Chief Complaint   Follow-up (fasting, seeing therapist every 2 weeks now); Back Pain (increased upper back pain along shoulder blades); and Immunizations (prevnar 13)       Patient Profile   Social History     Social History Narrative     Not on file        Past Medical History   Patient Active Problem List   Diagnosis     Meningioma     Peripheral Neuropathy     Hypothyroidism     Hyperlipidemia     Carpal Tunnel Syndrome     Essential Hypertension     Osteopenia     Seizure disorder (H)     Osteoarthritis of right knee     GERD (gastroesophageal reflux disease)     Allergic rhinitis     Essential hypertension     Hyperlipidemia     Hypothyroid     Cooper's neuroma       Past Surgical History  She has no past surgical history on file.       Medications and Allergies   Current Outpatient Medications   Medication Sig     ALPRAZolam  (XANAX) 0.25 MG tablet Take 1 tablet (0.25 mg total) by mouth 3 (three) times a day as needed for anxiety.     amLODIPine (NORVASC) 5 MG tablet Take 2 tablets (10 mg total) by mouth daily.     fluticasone (FLONASE) 50 mcg/actuation nasal spray USE 1 SPRAY IN EACH NOSTRIL EVERY DAY; SHAKE BEFORE USE.     lamoTRIgine (LAMICTAL) 100 MG tablet TAKE 1 AND 1/2 TABLETS BY MOUTH TWICE DAILY     levothyroxine (SYNTHROID, LEVOTHROID) 88 MCG tablet Take 1 tablet (88 mcg total) by mouth Daily at 6:00 am.     meclizine (ANTIVERT) 25 mg tablet Take 1 tablet (25 mg total) by mouth 3 (three) times a day as needed for dizziness or nausea.     simvastatin (ZOCOR) 20 MG tablet Take 1 tablet (20 mg total) by mouth every evening.     Allergies   Allergen Reactions     Codeine         Family and Social History   No family history on file.     Social History     Tobacco Use     Smoking status: Never Smoker     Smokeless tobacco: Never Used   Substance Use Topics     Alcohol use: No     Drug use: No        Physical Exam       Physical Exam  /80   Pulse 69   Ht 5' (1.524 m)   Wt 126 lb (57.2 kg)   SpO2 96%   BMI 24.61 kg/m    General appearance: alert, appears stated age, cooperative and no distress  Head: Normocephalic, without obvious abnormality, atraumatic  Throat: lips, mucosa, and tongue normal; teeth and gums normal  Neck: no adenopathy, no carotid bruit, no JVD, supple, symmetrical, trachea midline and thyroid not enlarged, symmetric, no tenderness/mass/nodules  Lungs: clear to auscultation bilaterally  Heart: regular rate and rhythm, S1, S2 normal, no murmur, click, rub or gallop  Abdomen: soft, non-tender; bowel sounds normal; no masses,  no organomegaly  Extremities: extremities normal, atraumatic, no cyanosis or edema  Skin: Skin color, texture, turgor normal. No rashes or lesions  Neurologic: Grossly normal  Psychiatric: Anxious and stressed     Additional Information        Lamont Kapadia MD  Internal  Medicine  Contact me at [phone]     Additional Information   Current Outpatient Medications   Medication Sig     ALPRAZolam (XANAX) 0.25 MG tablet Take 1 tablet (0.25 mg total) by mouth 3 (three) times a day as needed for anxiety.     amLODIPine (NORVASC) 5 MG tablet Take 2 tablets (10 mg total) by mouth daily.     fluticasone (FLONASE) 50 mcg/actuation nasal spray USE 1 SPRAY IN EACH NOSTRIL EVERY DAY; SHAKE BEFORE USE.     lamoTRIgine (LAMICTAL) 100 MG tablet TAKE 1 AND 1/2 TABLETS BY MOUTH TWICE DAILY     levothyroxine (SYNTHROID, LEVOTHROID) 88 MCG tablet Take 1 tablet (88 mcg total) by mouth Daily at 6:00 am.     meclizine (ANTIVERT) 25 mg tablet Take 1 tablet (25 mg total) by mouth 3 (three) times a day as needed for dizziness or nausea.     simvastatin (ZOCOR) 20 MG tablet Take 1 tablet (20 mg total) by mouth every evening.     Allergies   Allergen Reactions     Codeine      Social History     Tobacco Use     Smoking status: Never Smoker     Smokeless tobacco: Never Used   Substance Use Topics     Alcohol use: No     Drug use: No         Time: total time spent with the patient was 25 minutes of which >50% was spent in counseling and coordination of care

## 2021-06-23 NOTE — PROGRESS NOTES
Mental Health Visit Note    1/15/2019    Start time: 900    Stop Time: 955   Session # 2    Session Type: Patient is presenting for an Individual session.    Suzan Ballard is a 63 y.o. female is being seen today for    Chief Complaint   Patient presents with      Follow Up     Depression     New symptoms or complaints: stress/worry regarding her 's health and how to handle it.     Functional Impairment:   Personal: 3  Family: 4  Work: retired  Social:2    Clinical assessment of mental status: The patient was on time for their scheduled session.  They were appropriately dressed with good grooming and hygiene.  The patient was oriented X4.  Patient was pleasant and cooperative.  Mood and affect were sad and congruent with the content of her speech.   The patient made appropriate eye contact.  Recent and remote memories were intact.  Thought process was logical and linear.  Speech/language (tone and rate) were normal.  Insight and judgment were adequate.  The patient's mental status was reviewed and remained unchanged from session dated 1/3/19.    Suicidal/Homicidal Ideation present: None Reported This Session    Patient's impression of their current status:  Patient comes to session reporting confusion, worry and feeling down regarding her ongoing situation with her .  She reports recent medical issues of his have her feeling worried about him and responsible to help him.        Therapist impression of patients current state: Patient's thoughts/feelings were explored and validated.  We processed through her thoughts/feelings through the lens of her values.  Patient encouraged to use her values as a way to  herself, not messaging from others.  Affirmed the patient and her ability to make ansari decisions.    Type of psychotherapeutic technique provided: Client centered and CBT    Progress toward short term goals:Progress as expected, as evidenced by patient's practice of breathing, engagement with  others and utlizing journaling.     Review of long term goals: Date of last review 10/2/2018    Diagnosis:   1. Adjustment disorder with mixed anxiety and depressed mood        Plan and Follow up: 1.  Patient to schedule for continued follow up psychotherapy appointments.    2.  Patient will use their crisis plan and/or access crisis services should symptoms       become worse.      3.  Patient to remain medication compliant.  4.  Patient to abstain from drugs and alcohol.  5.  Patient to identify values.  6.  Patient to challenger herself to avoid binary situation/options.        Discharge Criteria/Planning: Patient will continue with follow-up until therapy can be discontinued without return of signs and symptoms.    Yoni Remy

## 2021-06-23 NOTE — PROGRESS NOTES
Dear Dr. Kapadia, Lamont GARCIA MD  [address] [place] [address],    Thank you for the opportunity to participate in the care of Suzan Ballard.     She is a 63 y.o. y/o female patient who comes to the sleep medicine clinic for follow up.  This is the patient's first clinical visit since getting this new CPAP machine.  She states that the pressure is a bit too high so she was unable to tolerate it.  Unfortunately the patient is heading for a vacation outside the country on March 18 of this year.  I will make some adjustments to the patient's pressure setting and strongly advised her to undergo pressure desensitization protocol and follow-up with me before she leaves for vacation.    Compliance Download data for 30 Days:  Pressure setting:APAP 9-15 cwp  Residual AHI:1.2 events per hour  Leak:Minimal  Compliance:10%  Mask Tolerance:Good  Skin irritation:none      Past Medical History:   Diagnosis Date     History of meningioma     causes seizures     Hypertension        No past surgical history on file.    Social History     Socioeconomic History     Marital status:      Spouse name: Not on file     Number of children: Not on file     Years of education: Not on file     Highest education level: Not on file   Social Needs     Financial resource strain: Not on file     Food insecurity - worry: Not on file     Food insecurity - inability: Not on file     Transportation needs - medical: Not on file     Transportation needs - non-medical: Not on file   Occupational History     Not on file   Tobacco Use     Smoking status: Never Smoker     Smokeless tobacco: Never Used   Substance and Sexual Activity     Alcohol use: No     Drug use: No     Sexual activity: Not on file   Other Topics Concern     Not on file   Social History Narrative     Not on file       Current Outpatient Medications   Medication Sig Dispense Refill     ALPRAZolam (XANAX) 0.25 MG tablet Take 1 tablet (0.25 mg total) by mouth 3  (three) times a day as needed for anxiety. 30 tablet 0     amLODIPine (NORVASC) 5 MG tablet Take 2 tablets (10 mg total) by mouth daily. 180 tablet 3     fluticasone (FLONASE) 50 mcg/actuation nasal spray USE 1 SPRAY IN EACH NOSTRIL EVERY DAY; SHAKE BEFORE USE. 16 g 3     ibuprofen (ADVIL,MOTRIN) 800 MG tablet Take 1 tablet (800 mg total) by mouth every 8 (eight) hours as needed for pain. 60 tablet 2     lamoTRIgine (LAMICTAL) 100 MG tablet TAKE 1 AND 1/2 TABLETS BY MOUTH TWICE DAILY 270 tablet 3     levothyroxine (SYNTHROID, LEVOTHROID) 88 MCG tablet Take 1 tablet (88 mcg total) by mouth Daily at 6:00 am. 180 tablet 1     meclizine (ANTIVERT) 25 mg tablet Take 1 tablet (25 mg total) by mouth 3 (three) times a day as needed for dizziness or nausea. 30 tablet 4     simvastatin (ZOCOR) 20 MG tablet Take 1 tablet (20 mg total) by mouth every evening. 90 tablet 3     No current facility-administered medications for this visit.        Allergies   Allergen Reactions     Codeine        Epworths Sleepiness Scale 12/6/2018 2/4/2019   Sitting and reading 1 0   Watching TV 1 1   Sitting, inactive in a public place (e.g. a theatre or a meeting) 0 0   As a passenger in a car for an hour without a break 1 0   Lying down to rest in the afternoon when circumstances permit 1 2   Sitting and talking to someone 0 0   Sitting quietly after a lunch without alcohol 1 0   In a car, while stopped for a few minutes in traffic 0 0   Total score 5 3       Physical Exam:  /62   Pulse 62   Ht 5' (1.524 m)   Wt 125 lb 9.6 oz (57 kg)   SpO2 97%   BMI 24.53 kg/m    BMI:Body mass index is 24.53 kg/m .   GEN: NAD,   Psych: normal mood, normal affect    Labs/Studies:     I reviewed the efficacy and compliance report from his device. Data summarized on the HPI and the CPAP compliance flow sheet.     Lab Results   Component Value Date    WBC 4.6 12/12/2018    HGB 14.7 12/12/2018    HCT 44.6 12/12/2018    MCV 82 12/12/2018      12/12/2018         Chemistry        Component Value Date/Time     12/12/2018 0935    K 3.4 (L) 12/12/2018 0935     12/12/2018 0935    CO2 28 12/12/2018 0935    BUN 12 12/12/2018 0935    CREATININE 0.64 12/12/2018 0935     12/12/2018 0935        Component Value Date/Time    CALCIUM 10.0 12/12/2018 0935    ALKPHOS 98 12/12/2018 0935    AST 18 12/12/2018 0935    ALT 20 12/12/2018 0935    BILITOT 2.7 (H) 12/12/2018 0935            No results found for: FERRITIN         Assessment and Plan:  In summary Suzan Ballard is a 63 y.o. year old female who is here for compliance download.    1.  Obstructive sleep apnea on CPAP  I narrow the patient's pressure down to 6 CWP.  I had the patient has set this pressure setting in front of me and she felt it was comfortable.  I went over desensitization protocol with the patient and advised her to use the machine religiously.  I will also give her a handout on this topic.  She is to return to clinic on March 15.  She realized that this will be double book but she is willing to wait.  2.  Other sleep disturbance     Patient verbalized understanding of these issues, agrees with the plan and all questions were answered today. Patient was given an opportuntity to voice any other symptoms or concerns not listed above. Patient did not have any other symptoms or concerns.      Patient instructed to stop at  to schedule appointment before leaving today.    Jai Morris DO  Board Certified in Internal Medicine and Sleep Medicine  [facility].    I spent a total of 15 minutes of face-to-face encounter and more than 50% of the encounter was used for counseling or coordination of care.    (Note created with Dragon voice recognition and unintended spelling errors and word substitutions may occur)

## 2021-06-23 NOTE — TELEPHONE ENCOUNTER
Refill Approved    Rx renewed per Medication Renewal Policy. Medication was last renewed on 6/13/17.    Stephany Quezada, [facility] Triage/Med Refill 1/25/2019     Requested Prescriptions   Pending Prescriptions Disp Refills     lamoTRIgine (LAMICTAL) 100 MG tablet [Pharmacy Med Name: LAMOTRIGINE 100MG TABLETS] 270 tablet 1     Sig: TAKE 1 AND 1/2 TABLETS BY MOUTH TWICE DAILY    Lamotrigine Refill Protocol Passed - 1/23/2019 10:39 AM       Passed - CBC (Hemogram 2) in last year     WBC   Date Value Ref Range Status   12/12/2018 4.6 4.0 - 11.0 thou/uL Final     RBC   Date Value Ref Range Status   12/12/2018 5.42 (H) 3.80 - 5.40 mill/uL Final     Hemoglobin   Date Value Ref Range Status   12/12/2018 14.7 12.0 - 16.0 g/dL Final     Hematocrit   Date Value Ref Range Status   12/12/2018 44.6 35.0 - 47.0 % Final     MCV   Date Value Ref Range Status   12/12/2018 82 80 - 100 fL Final     MCH   Date Value Ref Range Status   12/12/2018 27.2 27.0 - 34.0 pg Final     MCHC   Date Value Ref Range Status   12/12/2018 33.0 32.0 - 36.0 g/dL Final     RDW   Date Value Ref Range Status   12/12/2018 12.2 11.0 - 14.5 % Final     Platelets   Date Value Ref Range Status   12/12/2018 267 140 - 440 thou/uL Final     MPV   Date Value Ref Range Status   12/12/2018 6.7 (L) 7.0 - 10.0 fL Final               Passed - PCP or prescribing provider visit in past 12 months      Last office visit with prescriber/PCP: 1/8/2019 Lamont Kapadia MD OR same dept: 1/8/2019 Lamont Kapadia MD OR same specialty: 1/8/2019 Lamont Kapadia MD  Last physical: Visit date not found Last MTM visit: Visit date not found   Next visit within 3 mo: Visit date not found  Next physical within 3 mo: Visit date not found  Prescriber OR PCP: Lamont Kapadia MD  Last diagnosis associated with med order: 1. Seizure disorder (H)  - lamoTRIgine (LAMICTAL) 100 MG tablet [Pharmacy Med Name: LAMOTRIGINE 100MG TABLETS]; TAKE 1 AND 1/2 TABLETS BY MOUTH TWICE  DAILY  Dispense: 270 tablet; Refill: 1    If protocol passes may refill for 12 months if within 3 months of last provider visit (or a total of 15 months).

## 2021-06-23 NOTE — PROGRESS NOTES
Mental Health Visit Note    1/29/2019    Start time: 900    Stop Time: 945   Session # 3    Session Type: Patient is presenting for an Individual session.    Suzan Ballard is a 63 y.o. female is being seen today for    Chief Complaint   Patient presents with     MH Follow Up     Anxiety     New symptoms or complaints: no none reported    Functional Impairment:   Personal: 3  Family: 4  Work: retired  Social:2    Clinical assessment of mental status: The patient was on time for their scheduled session.  They were appropriately dressed with good grooming and hygiene.  The patient was oriented X4.  Patient was pleasant and cooperative.  Mood and affect were variably sad and euthymic, congruent with the content of her speech.   The patient made appropriate eye contact.  Recent and remote memories were intact.  Thought process was logical and linear.  Speech/language (tone and rate) were normal.  Insight and judgment were adequate.      Suicidal/Homicidal Ideation present: None Reported This Session    Patient's impression of their current status:  Patient comes to session reporting feeling better since previous week.  She reports her 's foot doing better this week.  She would like to suggest that this be her final therapy session.      Therapist impression of patients current state: Patient's thoughts/feelings were explored and validated.  Affirmed the patient's desire to discontinue therapy.  Administered GAD7 for patient, she scored 8.  This compares to her anxiety score at first intake which was 20 and then her score 1 month later at 12.  She has met her therapy goals, her anxiety has decreased.  It is reasonable for her to discharge successfully from therapy.  We reviewed stages of grief.  Focused again on giving permission for her to have feelings through this process and encouraged her to focus on managing herself through emotions and not in avoidance of emotions.      Type of psychotherapeutic technique  provided: Client centered and CBT    Progress toward short term goals:Progress as expected, as evidenced by patient's practice of breathing, engagement with others and utlizing journaling.     Review of long term goals: Date of last review 10/2/2018    Diagnosis:   1. Adjustment disorder with mixed anxiety and depressed mood        Plan and Follow up: 1.  Patient to schedule for psychotherapy appointments as she many need in the future.    2.  Patient will use their crisis plan and/or access crisis services should symptoms       become worse.      3.  Patient to remain medication compliant.  4.  Patient to abstain from drugs and alcohol.    Discharge Criteria/Planning: Patient will dc from psychotherapy having met her therapeutic goals.      Yoni Remy

## 2021-06-24 ENCOUNTER — COMMUNICATION - HEALTHEAST (OUTPATIENT)
Dept: INTERNAL MEDICINE | Facility: CLINIC | Age: 66
End: 2021-06-24

## 2021-06-24 NOTE — TELEPHONE ENCOUNTER
Patient had a question regarding if Dr. Kapadia could prescribe antibiotics for her phlemg. Dr. Kapadia advises to drink lots of fluids and she could take OTC zyrtec or claritin. Informed her if she has additional questions to call back.     NARCISO/KAMAR

## 2021-06-24 NOTE — PROGRESS NOTES
Dear Dr. Kapadia, Lamont GARCIA MD  [address] [place] [address],    Thank you for the opportunity to participate in the care of Suzan Ballard.     She is a 63 y.o. y/o female patient who comes to the sleep medicine clinic for follow up.  Since the patient's last clinical visit with me she has been doing well on the lower CPAP pressure setting.  She reports that her sleep quality and energy level have both improved.    Compliance Download data for 30 days:  Pressure settin CWP  Residual AHI: 1.4 events per hour  Leak: Minimal  Compliance: 83%  Mask Tolerance: Good  Skin irritation: None    Past Medical History:   Diagnosis Date     History of meningioma     causes seizures     Hypertension        History reviewed. No pertinent surgical history.    Social History     Socioeconomic History     Marital status:      Spouse name: Not on file     Number of children: Not on file     Years of education: Not on file     Highest education level: Not on file   Occupational History     Not on file   Social Needs     Financial resource strain: Not on file     Food insecurity:     Worry: Not on file     Inability: Not on file     Transportation needs:     Medical: Not on file     Non-medical: Not on file   Tobacco Use     Smoking status: Never Smoker     Smokeless tobacco: Never Used   Substance and Sexual Activity     Alcohol use: No     Drug use: No     Sexual activity: Not on file   Lifestyle     Physical activity:     Days per week: Not on file     Minutes per session: Not on file     Stress: Not on file   Relationships     Social connections:     Talks on phone: Not on file     Gets together: Not on file     Attends Jainism service: Not on file     Active member of club or organization: Not on file     Attends meetings of clubs or organizations: Not on file     Relationship status: Not on file     Intimate partner violence:     Fear of current or ex partner: Not on file     Emotionally abused:  Not on file     Physically abused: Not on file     Forced sexual activity: Not on file   Other Topics Concern     Not on file   Social History Narrative     Not on file       Current Outpatient Medications   Medication Sig Dispense Refill     ALPRAZolam (XANAX) 0.25 MG tablet Take 1 tablet (0.25 mg total) by mouth 3 (three) times a day as needed for anxiety. 30 tablet 0     amLODIPine (NORVASC) 5 MG tablet Take 2 tablets (10 mg total) by mouth daily. 180 tablet 3     fluticasone (FLONASE) 50 mcg/actuation nasal spray USE 1 SPRAY IN EACH NOSTRIL EVERY DAY; SHAKE BEFORE USE. 16 g 3     ibuprofen (ADVIL,MOTRIN) 800 MG tablet Take 1 tablet (800 mg total) by mouth every 8 (eight) hours as needed for pain. 60 tablet 2     lamoTRIgine (LAMICTAL) 100 MG tablet TAKE 1 AND 1/2 TABLETS BY MOUTH TWICE DAILY 270 tablet 3     levothyroxine (SYNTHROID, LEVOTHROID) 88 MCG tablet Take 1 tablet (88 mcg total) by mouth Daily at 6:00 am. 180 tablet 1     meclizine (ANTIVERT) 25 mg tablet Take 1 tablet (25 mg total) by mouth 3 (three) times a day as needed for dizziness or nausea. 30 tablet 4     simvastatin (ZOCOR) 20 MG tablet Take 1 tablet (20 mg total) by mouth every evening. 90 tablet 3     No current facility-administered medications for this visit.        Allergies   Allergen Reactions     Codeine Nausea And Vomiting       Epworths Sleepiness Scale 12/6/2018 2/4/2019 3/13/2019   Sitting and reading 1 0 1   Watching TV 1 1 1   Sitting, inactive in a public place (e.g. a theatre or a meeting) 0 0 0   As a passenger in a car for an hour without a break 1 0 0   Lying down to rest in the afternoon when circumstances permit 1 2 2   Sitting and talking to someone 0 0 0   Sitting quietly after a lunch without alcohol 1 0 1   In a car, while stopped for a few minutes in traffic 0 0 0   Total score 5 3 5       Physical Exam:  /60 (Patient Site: Right Arm, Patient Position: Sitting, Cuff Size: Adult Regular)   Pulse 60   Ht 5'  (1.524 m)   Wt 128 lb 6.4 oz (58.2 kg)   SpO2 97%   BMI 25.08 kg/m    BMI:Body mass index is 25.08 kg/m .   GEN: NAD,  Psych: normal mood, normal affect    Labs/Studies:     I reviewed the efficacy and compliance report from his device. Data summarized on the HPI and the CPAP compliance flow sheet.     Lab Results   Component Value Date    WBC 4.6 12/12/2018    HGB 14.7 12/12/2018    HCT 44.6 12/12/2018    MCV 82 12/12/2018     12/12/2018         Chemistry        Component Value Date/Time     12/12/2018 0935    K 3.4 (L) 12/12/2018 0935     12/12/2018 0935    CO2 28 12/12/2018 0935    BUN 12 12/12/2018 0935    CREATININE 0.64 12/12/2018 0935     12/12/2018 0935        Component Value Date/Time    CALCIUM 10.0 12/12/2018 0935    ALKPHOS 98 12/12/2018 0935    AST 18 12/12/2018 0935    ALT 20 12/12/2018 0935    BILITOT 2.7 (H) 12/12/2018 0935            No results found for: FERRITIN         Assessment and Plan:  In summary Suzan Ballard is a 63 y.o. year old female who is here for compliance download.    1.  Obstructive sleep apnea on CPAP  I congratulated the patient on her excellent CPAP usage.  I will keep her on the same pressure settings for now.  2.  Hypersomnia  Improving  3.  Other sleep disturbance     Patient verbalized understanding of these issues, agrees with the plan and all questions were answered today. Patient was given an opportuntity to voice any other symptoms or concerns not listed above. Patient did not have any other symptoms or concerns.      Patient told to return in 24 months. Patient instructed to stop at  to schedule appointment before leaving today.    Jai Morris DO  Board Certified in Internal Medicine and Sleep Medicine  [facility].    More than 50% of the encounter was used for counseling or coordination of care.    (Note created with Dragon voice recognition and unintended spelling errors and word substitutions may occur)

## 2021-06-25 NOTE — TELEPHONE ENCOUNTER
RN cannot approve Refill Request    RN can NOT refill this medication refill too soon Last refilled on 3/14/19 . Last office visit: 1/8/2019 Lamont Kapadia MD Last Physical: Visit date not found Last MTM visit: Visit date not found Last visit same specialty: 1/8/2019 Lamont Kapadia MD.  Next visit within 3 mo: 3/14/2019 Lamont Kapadia MD  Next physical within 3 mo: Visit date not found      Alonso Espinoza, [facility] Triage/Med Refill 3/14/2019    Requested Prescriptions   Pending Prescriptions Disp Refills     levothyroxine (SYNTHROID, LEVOTHROID) 88 MCG tablet [Pharmacy Med Name: LEVOTHYROXINE 0.088MG (88MCG) TAB] 180 tablet 0     Sig: TAKE ONE TABLET DAILY AT 6AM    Thyroid Hormones Protocol Passed - 3/11/2019  5:44 AM       Passed - Provider visit in past 12 months or next 3 months    Last office visit with prescriber/PCP: 1/8/2019 Lamont Kapadia MD OR same dept: 1/8/2019 Lamont Kapadia MD OR same specialty: 1/8/2019 Lamont Kapadia MD  Last physical: Visit date not found Last MTM visit: Visit date not found   Next visit within 3 mo: 3/14/2019 Lamont Kapadia MD  Next physical within 3 mo: Visit date not found  Prescriber OR PCP: Lamont Kapadia MD  Last diagnosis associated with med order: 1. Hypothyroidism  - levothyroxine (SYNTHROID, LEVOTHROID) 88 MCG tablet [Pharmacy Med Name: LEVOTHYROXINE 0.088MG (88MCG) TAB]; TAKE ONE TABLET DAILY AT 6AM  Dispense: 180 tablet; Refill: 0    If protocol passes may refill for 12 months if within 3 months of last provider visit (or a total of 15 months).            Passed - TSH on file in past 12 months for patient age 12 & older    TSH   Date Value Ref Range Status   03/14/2019 0.48 0.30 - 5.00 uIU/mL Final

## 2021-06-25 NOTE — PROGRESS NOTES
Office Visit - Follow Up   Suzan Ballard   63 y.o. female    Date of Visit: 3/14/2019    Chief Complaint   Patient presents with     Follow-up     2 months         Assessment and Plan   1. Essential hypertension  Controlled.  Continue amlodipine.  Needs refill of amlodipine.  Check CBC and basic metabolic panel.  - HM2(CBC w/o Differential)  - Basic Metabolic Panel  - amLODIPine (NORVASC) 5 MG tablet; Take 2 tablets (10 mg total) by mouth daily.  Dispense: 180 tablet; Refill: 3    2. Mixed hyperlipidemia  Controlled.  Last lipids were good.  Continue simvastatin.  Check fasting lipid liver function.  - Lipid Cascade  - Hepatic Profile    3. Seizure disorder (H)  Has seizure disorder but now controlled with Lamictal.  Followed by neurology.  Due to her history of meningioma, status post excision x3.  - lamoTRIgine (LAMICTAL) 100 MG tablet; Take 1.5 tablets (150 mg total) by mouth 2 (two) times a day.  Dispense: 270 tablet; Refill: 3    4. Primary osteoarthritis involving multiple joints  Has aches and pains involving multiple joints due to osteoarthritis.  Takes ibuprofen which works.  - ibuprofen (ADVIL,MOTRIN) 800 MG tablet; Take 1 tablet (800 mg total) by mouth every 8 (eight) hours as needed for pain.  Dispense: 90 tablet; Refill: 2    5. Acquired hypothyroidism  Supplemented by levothyroxine.  Continue same dose of levothyroxine.  Check TSH and free T4.  - Thyroid Stimulating Hormone (TSH)  - T4, Free  - levothyroxine (SYNTHROID, LEVOTHROID) 88 MCG tablet; Take 1 tablet (88 mcg total) by mouth Daily at 6:00 am.  Dispense: 180 tablet; Refill: 1    6. Allergic rhinitis, unspecified seasonality, unspecified trigger  Has  seasonal allergic rhinitis.  Uses fluticasone nasal spray.  - fluticasone (FLONASE) 50 mcg/actuation nasal spray; USE 1 SPRAY IN EACH NOSTRIL EVERY DAY; SHAKE BEFORE USE  Dispense: 16 g; Refill: 3    7. Osteopenia  Has osteopenia.  Not due for bone density.  Takes calcium with by vitamin D and  extra vitamin D.  Check vitamin D.  - Vitamin D, Total (25-Hydroxy)    Will travel to the [facility] next week.  Will stay there for more than 5 months.  Okay to travel.      Follow up in in 6 months after coming back from the [facility].     History of Present Illness   This 63 y.o. old female is here for follow-up.  Has several medical problems but these are now controlled.  Has hypertension controlled by amlodipine alone.  Has hyperlipidemia controlled by simvastatin.  Has acquired hypothyroidism now well supplemented by current dose of levothyroxine.  Has aches and pains due to her osteoarthritis involving multiple joints.  But learns to live with her pains and take ibuprofen as needed.  Has allergic rhinitis.  Comes and goes especially during spring and fall.  Takes fluticasone nasal spray which works.  Overall, doing and feeling well.  Will travel to the [facility] next week and will stay there for 5 months or more.    Review of Systems   A 12 point comprehensive review of systems was negative except as noted..     Medications, Allergies and Problem List   Reviewed and updated             Chief Complaint   Follow-up (2 months )       Patient Profile   Social History     Social History Narrative     Not on file        Past Medical History   Patient Active Problem List   Diagnosis     Meningioma     Peripheral Neuropathy     Hypothyroidism     Hyperlipidemia     Carpal Tunnel Syndrome     Essential Hypertension     Osteopenia     Seizure disorder (H)     Osteoarthritis of right knee     GERD (gastroesophageal reflux disease)     Allergic rhinitis     Essential hypertension     Hyperlipidemia     Cooper's neuroma       Past Surgical History  She has no past surgical history on file.       Medications and Allergies   Current Outpatient Medications   Medication Sig     ALPRAZolam (XANAX) 0.25 MG tablet Take 1 tablet (0.25 mg total) by mouth 3 (three) times a day as needed for anxiety.     amLODIPine (NORVASC) 5  MG tablet Take 2 tablets (10 mg total) by mouth daily.     fluticasone (FLONASE) 50 mcg/actuation nasal spray USE 1 SPRAY IN EACH NOSTRIL EVERY DAY; SHAKE BEFORE USE     ibuprofen (ADVIL,MOTRIN) 800 MG tablet Take 1 tablet (800 mg total) by mouth every 8 (eight) hours as needed for pain.     lamoTRIgine (LAMICTAL) 100 MG tablet Take 1.5 tablets (150 mg total) by mouth 2 (two) times a day.     levothyroxine (SYNTHROID, LEVOTHROID) 88 MCG tablet Take 1 tablet (88 mcg total) by mouth Daily at 6:00 am.     meclizine (ANTIVERT) 25 mg tablet Take 1 tablet (25 mg total) by mouth 3 (three) times a day as needed for dizziness or nausea.     simvastatin (ZOCOR) 20 MG tablet Take 1 tablet (20 mg total) by mouth every evening.     Allergies   Allergen Reactions     Codeine Nausea And Vomiting        Family and Social History   No family history on file.     Social History     Tobacco Use     Smoking status: Never Smoker     Smokeless tobacco: Never Used   Substance Use Topics     Alcohol use: No     Drug use: No        Physical Exam       Physical Exam  /68 (Patient Site: Left Arm, Patient Position: Sitting, Cuff Size: Adult Regular)   Pulse 61   Ht 5' (1.524 m)   Wt 125 lb (56.7 kg)   SpO2 98%   BMI 24.41 kg/m    General appearance: alert, appears stated age, cooperative and no distress  Head: Normocephalic, without obvious abnormality, atraumatic  Throat: lips, mucosa, and tongue normal; teeth and gums normal  Neck: no adenopathy, no carotid bruit, no JVD, supple, symmetrical, trachea midline and thyroid not enlarged, symmetric, no tenderness/mass/nodules  Lungs: clear to auscultation bilaterally  Heart: regular rate and rhythm, S1, S2 normal, no murmur, click, rub or gallop  Abdomen: soft, non-tender; bowel sounds normal; no masses,  no organomegaly  Extremities: extremities normal, atraumatic, no cyanosis or edema  Skin: Skin color, texture, turgor normal. No rashes or lesions  Neurologic: Grossly  normal  Musculoskeletal: Normal exam     Additional Information        Lamont Kapadia MD  Internal Medicine  Contact me at [phone]     Additional Information   Current Outpatient Medications   Medication Sig     ALPRAZolam (XANAX) 0.25 MG tablet Take 1 tablet (0.25 mg total) by mouth 3 (three) times a day as needed for anxiety.     amLODIPine (NORVASC) 5 MG tablet Take 2 tablets (10 mg total) by mouth daily.     fluticasone (FLONASE) 50 mcg/actuation nasal spray USE 1 SPRAY IN EACH NOSTRIL EVERY DAY; SHAKE BEFORE USE     ibuprofen (ADVIL,MOTRIN) 800 MG tablet Take 1 tablet (800 mg total) by mouth every 8 (eight) hours as needed for pain.     lamoTRIgine (LAMICTAL) 100 MG tablet Take 1.5 tablets (150 mg total) by mouth 2 (two) times a day.     levothyroxine (SYNTHROID, LEVOTHROID) 88 MCG tablet Take 1 tablet (88 mcg total) by mouth Daily at 6:00 am.     meclizine (ANTIVERT) 25 mg tablet Take 1 tablet (25 mg total) by mouth 3 (three) times a day as needed for dizziness or nausea.     simvastatin (ZOCOR) 20 MG tablet Take 1 tablet (20 mg total) by mouth every evening.     Allergies   Allergen Reactions     Codeine Nausea And Vomiting     Social History     Tobacco Use     Smoking status: Never Smoker     Smokeless tobacco: Never Used   Substance Use Topics     Alcohol use: No     Drug use: No         Time: total time spent with the patient was 25 minutes of which >50% was spent in counseling and coordination of care

## 2021-07-01 ENCOUNTER — RECORDS - HEALTHEAST (OUTPATIENT)
Dept: ADMINISTRATIVE | Facility: OTHER | Age: 66
End: 2021-07-01

## 2021-07-04 NOTE — LETTER
Letter by Lamont Kapadia MD at      Author: Lamont Kapadia MD Service: -- Author Type: --    Filed:  Encounter Date: 6/24/2021 Status: (Other)         Suzan Ballrad  [address]  [place] [address]             June 24, 2021         Dear MsRita Ballard,    Below are the results from your recent visit:    Resulted Orders   Erythrocyte Sedimentation Rate   Result Value Ref Range    Sed Rate 7 0 - 20 mm/hr   C-Reactive Protein (CRP)   Result Value Ref Range    CRP 0.1 0.0 - 0.8 mg/dL   Uric Acid   Result Value Ref Range    Uric Acid 4.1 2.0 - 7.5 mg/dL       Hi Suzan. Your uric acid is normal, hence, your hand pains are not due to gout. Your markers of  Inflammation are also normal. I suspect your hand pains are due to osteoarthritis. Thanks.     Please call with questions or contact us using [email or web address].    Sincerely,        Electronically signed by Lamont Kapadia MD

## 2021-07-21 ENCOUNTER — RECORDS - HEALTHEAST (OUTPATIENT)
Dept: ADMINISTRATIVE | Facility: CLINIC | Age: 66
End: 2021-07-21

## 2021-07-28 ENCOUNTER — OFFICE VISIT (OUTPATIENT)
Dept: INTERNAL MEDICINE | Facility: CLINIC | Age: 66
End: 2021-07-28
Payer: MEDICARE

## 2021-07-28 VITALS
OXYGEN SATURATION: 97 % | WEIGHT: 134.6 LBS | HEART RATE: 57 BPM | DIASTOLIC BLOOD PRESSURE: 72 MMHG | SYSTOLIC BLOOD PRESSURE: 122 MMHG | BODY MASS INDEX: 26.42 KG/M2 | HEIGHT: 60 IN | TEMPERATURE: 97.2 F

## 2021-07-28 DIAGNOSIS — G40.909 SEIZURE DISORDER (H): ICD-10-CM

## 2021-07-28 DIAGNOSIS — I10 ESSENTIAL HYPERTENSION: ICD-10-CM

## 2021-07-28 DIAGNOSIS — M25.561 ACUTE PAIN OF RIGHT KNEE: Primary | ICD-10-CM

## 2021-07-28 DIAGNOSIS — E03.9 ACQUIRED HYPOTHYROIDISM: ICD-10-CM

## 2021-07-28 DIAGNOSIS — E55.9 VITAMIN D DEFICIENCY: ICD-10-CM

## 2021-07-28 DIAGNOSIS — E78.2 MIXED HYPERLIPIDEMIA: ICD-10-CM

## 2021-07-28 DIAGNOSIS — D32.9 MENINGIOMA (H): ICD-10-CM

## 2021-07-28 LAB
ALBUMIN SERPL-MCNC: 4 G/DL (ref 3.5–5)
ALP SERPL-CCNC: 128 U/L (ref 45–120)
ALT SERPL W P-5'-P-CCNC: 19 U/L (ref 0–45)
ANION GAP SERPL CALCULATED.3IONS-SCNC: 11 MMOL/L (ref 5–18)
AST SERPL W P-5'-P-CCNC: 14 U/L (ref 0–40)
BILIRUB DIRECT SERPL-MCNC: 0.6 MG/DL
BILIRUB SERPL-MCNC: 2.2 MG/DL (ref 0–1)
BUN SERPL-MCNC: 13 MG/DL (ref 8–22)
CALCIUM SERPL-MCNC: 9.9 MG/DL (ref 8.5–10.5)
CHLORIDE BLD-SCNC: 106 MMOL/L (ref 98–107)
CHOLEST SERPL-MCNC: 235 MG/DL
CO2 SERPL-SCNC: 26 MMOL/L (ref 22–31)
CREAT SERPL-MCNC: 0.57 MG/DL (ref 0.6–1.1)
FASTING STATUS PATIENT QL REPORTED: YES
GFR SERPL CREATININE-BSD FRML MDRD: >90 ML/MIN/1.73M2
GLUCOSE BLD-MCNC: 87 MG/DL (ref 70–125)
HDLC SERPL-MCNC: 72 MG/DL
LDLC SERPL CALC-MCNC: 136 MG/DL
POTASSIUM BLD-SCNC: 3.6 MMOL/L (ref 3.5–5)
PROT SERPL-MCNC: 7.2 G/DL (ref 6–8)
SODIUM SERPL-SCNC: 143 MMOL/L (ref 136–145)
T4 FREE SERPL-MCNC: 1.24 NG/DL (ref 0.7–1.8)
TRIGL SERPL-MCNC: 134 MG/DL
TSH SERPL DL<=0.005 MIU/L-ACNC: 1.97 UIU/ML (ref 0.3–5)

## 2021-07-28 PROCEDURE — 99214 OFFICE O/P EST MOD 30 MIN: CPT | Performed by: INTERNAL MEDICINE

## 2021-07-28 PROCEDURE — 36415 COLL VENOUS BLD VENIPUNCTURE: CPT | Performed by: INTERNAL MEDICINE

## 2021-07-28 PROCEDURE — 80053 COMPREHEN METABOLIC PANEL: CPT | Performed by: INTERNAL MEDICINE

## 2021-07-28 PROCEDURE — 82306 VITAMIN D 25 HYDROXY: CPT | Performed by: INTERNAL MEDICINE

## 2021-07-28 PROCEDURE — 84439 ASSAY OF FREE THYROXINE: CPT | Performed by: INTERNAL MEDICINE

## 2021-07-28 PROCEDURE — 84443 ASSAY THYROID STIM HORMONE: CPT | Performed by: INTERNAL MEDICINE

## 2021-07-28 PROCEDURE — 82248 BILIRUBIN DIRECT: CPT | Performed by: INTERNAL MEDICINE

## 2021-07-28 PROCEDURE — 80061 LIPID PANEL: CPT | Performed by: INTERNAL MEDICINE

## 2021-07-28 RX ORDER — CELECOXIB 200 MG/1
1 CAPSULE ORAL DAILY
COMMUNITY
Start: 2021-02-01 | End: 2022-03-24

## 2021-07-28 RX ORDER — ALPRAZOLAM 0.25 MG
0.25 TABLET ORAL PRN
COMMUNITY
Start: 2020-09-29 | End: 2021-08-24

## 2021-07-28 RX ORDER — BLOOD PRESSURE TEST KIT
1 KIT MISCELLANEOUS DAILY
COMMUNITY
Start: 2021-04-06

## 2021-07-28 ASSESSMENT — MIFFLIN-ST. JEOR: SCORE: 1067.04

## 2021-07-28 NOTE — PROGRESS NOTES
Assessment & Plan     Acute pain of right knee  Suffers from acute right knee pains for almost 2 months. No injury or trauma. I think this is due to osteoarthritis. Scheduled to see orthopedics tomorrow.     Mixed hyperlipidemia  Controlled. Last lipids were good. Continue simvastatin.   - Hepatic panel (Albumin, ALT, AST, Bili, Alk Phos, TP); Future  - Lipid Profile (Chol, Trig, HDL, LDL calc); Future    Essential hypertension  Controlled. Continue amlodipine.   - Basic metabolic panel  (Ca, Cl, CO2, Creat, Gluc, K, Na, BUN); Future    Acquired hypothyroidism  Supplemented by levothyroxine. Last thyroid function was normal.   - TSH; Future  - T4, free; Future    Meningioma (H)  S/p excision. No recurrence. Followed by neurology. Used to see Dr. Perez but he already retired.     Seizure disorder (H)  No recurrence. Continue Lamictal.     Vitamin D deficiency  Corrected by vitamin D supplement.   - Vitamin D Deficiency; Future    :[id number]}     BMI:   Estimated body mass index is 26.29 kg/m  as calculated from the following:    Height as of this encounter: 1.524 m (5').    Weight as of this encounter: 61.1 kg (134 lb 9.6 oz).       FUTURE APPOINTMENTS:       - Follow-up visit in 4 months.     Return in about 4 months (around 11/28/2021).    DORIS ESCAMILLA MD  [facility]    Jenny Mares is a 65 year old who presents for the following health issues     HPI  Follow up visit for Suzan. Complains of recurring right knee pains, quite acute only started 2 months ago. No trauma or injury. Pains are worse with prolonged walking. Wearing a knee brace eases her pains. Was referred to ortho and will see ortho tomorrow. Has hypertension and hyperlipidemia controlled by her medications. S/p excision of meningioma with residual seizure. No recurrence and no episodes of seizure with Lamictal. Used to Dr. Perez of neurology but he retired. Has hypothyroidism supplemented by levothyroxine. Has  vitamin D def now corrected by vitamin D supplement. Overall, feels well.     Hyperlipidemia Follow-Up      Are you regularly taking any medication or supplement to lower your cholesterol?   Yes- simvastatin    Are you having muscle aches or other side effects that you think could be caused by your cholesterol lowering medication?  No    Hypertension Follow-up      Do you check your blood pressure regularly outside of the clinic? No     Are you following a low salt diet? Yes    Are your blood pressures ever more than 140 on the top number (systolic) OR more   than 90 on the bottom number (diastolic), for example 140/90? No      How many servings of fruits and vegetables do you eat daily?  2-3    On average, how many sweetened beverages do you drink each day (Examples: soda, juice, sweet tea, etc.  Do NOT count diet or artificially sweetened beverages)?   2    How many days per week do you exercise enough to make your heart beat faster? 3 or less    How many minutes a day do you exercise enough to make your heart beat faster? 9 or less    How many days per week do you miss taking your medication? 0      Review of Systems   Constitutional, HEENT, cardiovascular, pulmonary, GI, , musculoskeletal, neuro, skin, endocrine and psych systems are negative, except as otherwise noted.      Objective    /72 (BP Location: Right arm, Patient Position: Sitting, Cuff Size: Adult Regular)   Pulse 57   Temp 97.2  F (36.2  C)   Ht 1.524 m (5')   Wt 61.1 kg (134 lb 9.6 oz)   SpO2 97%   BMI 26.29 kg/m    Body mass index is 26.29 kg/m .  Physical Exam   GENERAL APPEARANCE: healthy, alert and no distress  EYES: Eyes grossly normal to inspection, PERRL and conjunctivae and sclerae normal  NECK: no adenopathy, no asymmetry, masses, or scars and thyroid normal to palpation  RESP: lungs clear to auscultation - no rales, rhonchi or wheezes  CV: regular rates and rhythm, normal S1 S2, no S3 or S4 and no murmur, click or  rub  ABDOMEN: soft, nontender, without hepatosplenomegaly or masses and bowel sounds normal  MS: extremities normal- no gross deformities noted  SKIN: no suspicious lesions or rashes  NEURO: Normal strength and tone, mentation intact and speech normal

## 2021-07-28 NOTE — LETTER
July 29, 2021              Suzan C Elio  [address]  [place] [address]        Dear ,    We are writing to inform you of your test results.    Hi again Suzan, your vitamin D is mildly decreased. I like you to take over the counter vitamin D 1000 units daily. Thanks.     Resulted Orders   Basic metabolic panel  (Ca, Cl, CO2, Creat, Gluc, K, Na, BUN)   Result Value Ref Range    Sodium 143 136 - 145 mmol/L    Potassium 3.6 3.5 - 5.0 mmol/L    Chloride 106 98 - 107 mmol/L    Carbon Dioxide (CO2) 26 22 - 31 mmol/L    Anion Gap 11 5 - 18 mmol/L    Urea Nitrogen 13 8 - 22 mg/dL    Creatinine 0.57 (L) 0.60 - 1.10 mg/dL    Calcium 9.9 8.5 - 10.5 mg/dL    Glucose 87 70 - 125 mg/dL    GFR Estimate >90 >60 mL/min/1.73m2      Comment:      As of July 11, 2021, eGFR is calculated by the CKD-EPI creatinine equation, without race adjustment. eGFR can be influenced by muscle mass, exercise, and diet. The reported eGFR is an estimation only and is only applicable if the renal function is stable.   Hepatic panel (Albumin, ALT, AST, Bili, Alk Phos, TP)   Result Value Ref Range    Bilirubin Total 2.2 (H) 0.0 - 1.0 mg/dL    Bilirubin Direct 0.6 (H) <=0.5 mg/dL    Protein Total 7.2 6.0 - 8.0 g/dL    Albumin 4.0 3.5 - 5.0 g/dL    Alkaline Phosphatase 128 (H) 45 - 120 U/L    AST 14 0 - 40 U/L    ALT 19 0 - 45 U/L   Lipid Profile (Chol, Trig, HDL, LDL calc)   Result Value Ref Range    Cholesterol 235 (H) <=199 mg/dL    Triglycerides 134 <=149 mg/dL    Direct Measure HDL 72 >=50 mg/dL      Comment:      HDL Cholesterol Reference Range:     0-2 years:   No reference ranges established for patients under 2 years old  at [facility] for lipid analytes.    2-8 years:  Greater than 45 mg/dL     18 years and older:   Female: Greater than or equal to 50 mg/dL   Male:   Greater than or equal to 40 mg/dL    LDL Cholesterol Calculated 136 (H) <=129 mg/dL    Patient Fasting > 8hrs? Yes    TSH   Result Value Ref Range     TSH 1.97 0.30 - 5.00 uIU/mL   T4, free   Result Value Ref Range    Free T4 1.24 0.70 - 1.80 ng/dL      Comment:      Performance of the Free T4 test has not been established with  specimens (<= 2 months of age).     Vitamin D Deficiency   Result Value Ref Range    Vitamin D, Total (25-Hydroxy) 21 (L) 30 - 80 ug/L    Narrative    Deficiency <10.0 ug/L  Insufficiency 10.0-29.9 ug/L  Sufficiency 30.0-80.0 ug/L  Toxicity (possible) >100.0 ug/L        If you have any questions or concerns, please call the clinic at the number listed above.       Sincerely,      Lamont Kapadia MD

## 2021-07-28 NOTE — LETTER
July 29, 2021              Suzan C Elio  [address]  [place] [address]        Dear ,    We are writing to inform you of your test results.    Govind Mares, just slight increased of your lipids compared to the last time. Continue simvastatin but stay away from fatty or high cholesterol diet. Otherwise, rest of your labs are normal with some insignificant changes of no clinical importance. Continue all your other medications.     As I said during your visit, it is an honor and privilege taking care of you through all these years. Hilario jaramillo.     Resulted Orders   Basic metabolic panel  (Ca, Cl, CO2, Creat, Gluc, K, Na, BUN)   Result Value Ref Range    Sodium 143 136 - 145 mmol/L    Potassium 3.6 3.5 - 5.0 mmol/L    Chloride 106 98 - 107 mmol/L    Carbon Dioxide (CO2) 26 22 - 31 mmol/L    Anion Gap 11 5 - 18 mmol/L    Urea Nitrogen 13 8 - 22 mg/dL    Creatinine 0.57 (L) 0.60 - 1.10 mg/dL    Calcium 9.9 8.5 - 10.5 mg/dL    Glucose 87 70 - 125 mg/dL    GFR Estimate >90 >60 mL/min/1.73m2      Comment:      As of July 11, 2021, eGFR is calculated by the CKD-EPI creatinine equation, without race adjustment. eGFR can be influenced by muscle mass, exercise, and diet. The reported eGFR is an estimation only and is only applicable if the renal function is stable.   Hepatic panel (Albumin, ALT, AST, Bili, Alk Phos, TP)   Result Value Ref Range    Bilirubin Total 2.2 (H) 0.0 - 1.0 mg/dL    Bilirubin Direct 0.6 (H) <=0.5 mg/dL    Protein Total 7.2 6.0 - 8.0 g/dL    Albumin 4.0 3.5 - 5.0 g/dL    Alkaline Phosphatase 128 (H) 45 - 120 U/L    AST 14 0 - 40 U/L    ALT 19 0 - 45 U/L   Lipid Profile (Chol, Trig, HDL, LDL calc)   Result Value Ref Range    Cholesterol 235 (H) <=199 mg/dL    Triglycerides 134 <=149 mg/dL    Direct Measure HDL 72 >=50 mg/dL      Comment:      HDL Cholesterol Reference Range:     0-2 years:   No reference ranges established for patients under 2 years old  at [facility]  for lipid analytes.    2-8 years:  Greater than 45 mg/dL     18 years and older:   Female: Greater than or equal to 50 mg/dL   Male:   Greater than or equal to 40 mg/dL    LDL Cholesterol Calculated 136 (H) <=129 mg/dL    Patient Fasting > 8hrs? Yes    TSH   Result Value Ref Range    TSH 1.97 0.30 - 5.00 uIU/mL   T4, free   Result Value Ref Range    Free T4 1.24 0.70 - 1.80 ng/dL      Comment:      Performance of the Free T4 test has not been established with  specimens (<= 2 months of age).         If you have any questions or concerns, please call the clinic at the number listed above.       Sincerely,      Lamont Kapadia MD

## 2021-07-29 ENCOUNTER — TRANSFERRED RECORDS (OUTPATIENT)
Dept: HEALTH INFORMATION MANAGEMENT | Facility: CLINIC | Age: 66
End: 2021-07-29

## 2021-07-29 LAB — DEPRECATED CALCIDIOL+CALCIFEROL SERPL-MC: 21 UG/L (ref 30–80)

## 2021-08-19 DIAGNOSIS — F41.9 ANXIETY: ICD-10-CM

## 2021-08-19 DIAGNOSIS — I10 ESSENTIAL HYPERTENSION: Primary | ICD-10-CM

## 2021-08-23 NOTE — TELEPHONE ENCOUNTER
Disp Refills Start End VELMA    amLODIPine (NORVASC) 5 MG tablet 180 tablet 3 9/29/2020  No   Sig - Route: Take 2 tablets (10 mg total) by mouth daily. - Oral   Sent to pharmacy as: amLODIPine 5 mg tablet (NORVASC)   E-Prescribing Status: Receipt confirmed by pharmacy (9/29/2020  1:46 PM CDT)   amLODIPine (NORVASC) 5 MG tablet [[id number]]    Electronically signed by: Lamont Kapadia MD on 09/29/20 1345 Status: Active   Ordering user: Lamont Kapadia MD 09/29/20 1345 Authorized by: Lamont Kapadia MD   Frequency: DAILY 09/29/20 - Until Discontinued Released by: Lamont Kapadia MD 09/29/20 1346   Diagnoses  Essential hypertension [I10]       Routing refill request to provider for review/approval because:  Labs out of range:  Creatinine  Early refill request     Last Written Prescription Date:  9/29/20  Last Fill Quantity: 180,  # refills: 3   Last office visit provider:  7/28/21     Requested Prescriptions   Pending Prescriptions Disp Refills     ALPRAZolam (XANAX) 0.25 MG tablet [Pharmacy Med Name: ALPRAZOLAM 0.25MG TABLETS] 30 tablet      Sig: TAKE 1 TABLET(0.25 MG) BY MOUTH THREE TIMES DAILY AS NEEDED FOR ANXIETY       There is no refill protocol information for this order        amLODIPine (NORVASC) 5 MG tablet [Pharmacy Med Name: AMLODIPINE BESYLATE 5MG TABLETS] 180 tablet      Sig: TAKE 2 TABLETS(10 MG) BY MOUTH DAILY       Calcium Channel Blockers Protocol  Failed - 8/19/2021  9:43 AM        Failed - Normal serum creatinine on file in past 12 months     Recent Labs   Lab Test 07/28/21  0858   CR 0.57*       Ok to refill medication if creatinine is low          Passed - Blood pressure under 140/90 in past 12 months     BP Readings from Last 3 Encounters:   07/28/21 122/72   06/23/21 120/62   03/23/21 126/72                 Passed - Recent (12 mo) or future (30 days) visit within the authorizing provider's specialty     Patient has had an office visit with the authorizing provider or a provider  "within the authorizing providers department within the previous 12 mos or has a future within next 30 days. See \"Patient Info\" tab in inbasket, or \"Choose Columns\" in Meds & Orders section of the refill encounter.              Passed - Medication is active on med list        Passed - Patient is age 18 or older        Passed - No active pregnancy on record        Passed - No positive pregnancy test in past 12 months             Neno Chun RN 08/23/21 10:01 AM  "

## 2021-08-23 NOTE — TELEPHONE ENCOUNTER
Disp Refills Start End VELMA    ALPRAZolam (XANAX) 0.25 MG tablet 30 tablet 0 9/29/2020  No   Sig - Route: Take 1 tablet (0.25 mg total) by mouth 3 (three) times a day as needed for anxiety. - Oral   Sent to pharmacy as: ALPRAZolam 0.25 mg tablet (Xanax)   E-Prescribing Status: Receipt confirmed by pharmacy (9/29/2020  1:46 PM CDT)   ALPRAZolam (XANAX) 0.25 MG tablet [[id number]]    Electronically signed by: Lamont Kapadia MD on 09/29/20 1345 Status: Active   Ordering user: Lamont Kapadia MD 09/29/20 1345 Authorized by: Lamont Kapadia MD   PRN reasons: anxiety   Frequency: TID PRN 09/29/20 - Until Discontinued Released by: Lamont Kapadia MD 09/29/20 1345   Diagnoses  Adjustment disorder with anxious mood [F43.22]       Routing refill request to provider for review/approval because:  Controlled substance request  A break in medication    Last Written Prescription Date:  9/29/20  Last Fill Quantity: 30,  # refills: 0   Last office visit provider:  7/28/21     Requested Prescriptions   Pending Prescriptions Disp Refills     ALPRAZolam (XANAX) 0.25 MG tablet [Pharmacy Med Name: ALPRAZOLAM 0.25MG TABLETS] 30 tablet      Sig: TAKE 1 TABLET(0.25 MG) BY MOUTH THREE TIMES DAILY AS NEEDED FOR ANXIETY       There is no refill protocol information for this order        amLODIPine (NORVASC) 5 MG tablet [Pharmacy Med Name: AMLODIPINE BESYLATE 5MG TABLETS] 180 tablet      Sig: TAKE 2 TABLETS(10 MG) BY MOUTH DAILY       Calcium Channel Blockers Protocol  Failed - 8/19/2021  9:43 AM        Failed - Normal serum creatinine on file in past 12 months     Recent Labs   Lab Test 07/28/21  0858   CR 0.57*       Ok to refill medication if creatinine is low          Passed - Blood pressure under 140/90 in past 12 months     BP Readings from Last 3 Encounters:   07/28/21 122/72   06/23/21 120/62   03/23/21 126/72                 Passed - Recent (12 mo) or future (30 days) visit within the authorizing provider's specialty  "    Patient has had an office visit with the authorizing provider or a provider within the authorizing providers department within the previous 12 mos or has a future within next 30 days. See \"Patient Info\" tab in inbasket, or \"Choose Columns\" in Meds & Orders section of the refill encounter.              Passed - Medication is active on med list        Passed - Patient is age 18 or older        Passed - No active pregnancy on record        Passed - No positive pregnancy test in past 12 months             Neno Chun RN 08/23/21 10:03 AM  "

## 2021-08-24 RX ORDER — ALPRAZOLAM 0.25 MG
TABLET ORAL
Qty: 30 TABLET | Refills: 0 | Status: SHIPPED | OUTPATIENT
Start: 2021-08-24 | End: 2023-10-11

## 2021-08-24 RX ORDER — AMLODIPINE BESYLATE 5 MG/1
TABLET ORAL
Qty: 180 TABLET | Refills: 1 | Status: SHIPPED | OUTPATIENT
Start: 2021-08-24 | End: 2022-07-05

## 2021-09-27 ENCOUNTER — TRANSFERRED RECORDS (OUTPATIENT)
Dept: HEALTH INFORMATION MANAGEMENT | Facility: CLINIC | Age: 66
End: 2021-09-27

## 2021-09-29 DIAGNOSIS — E03.9 ACQUIRED HYPOTHYROIDISM: Primary | ICD-10-CM

## 2021-09-30 RX ORDER — LEVOTHYROXINE SODIUM 88 UG/1
TABLET ORAL
Qty: 90 TABLET | Refills: 3 | Status: SHIPPED | OUTPATIENT
Start: 2021-09-30 | End: 2022-03-30

## 2021-09-30 NOTE — TELEPHONE ENCOUNTER
"Last Written Prescription Date:  9/29/20  Last Fill Quantity: 180,  # refills: 3   Last office visit provider:  7/28/21     Requested Prescriptions   Pending Prescriptions Disp Refills     levothyroxine (SYNTHROID/LEVOTHROID) 88 MCG tablet [Pharmacy Med Name: LEVOTHYROXINE 0.088MG (88MCG) TAB] 180 tablet      Sig: TAKE 1 TABLET BY MOUTH EVERY MORNING AT 6AM       Thyroid Protocol Passed - 9/29/2021  5:29 AM        Passed - Patient is 12 years or older        Passed - Recent (12 mo) or future (30 days) visit within the authorizing provider's specialty     Patient has had an office visit with the authorizing provider or a provider within the authorizing providers department within the previous 12 mos or has a future within next 30 days. See \"Patient Info\" tab in inbasket, or \"Choose Columns\" in Meds & Orders section of the refill encounter.              Passed - Medication is active on med list        Passed - Normal TSH on file in past 12 months     Recent Labs   Lab Test 07/28/21  0858   TSH 1.97              Passed - No active pregnancy on record     If patient is pregnant or has had a positive pregnancy test, please check TSH.          Passed - No positive pregnancy test in past 12 months     If patient is pregnant or has had a positive pregnancy test, please check TSH.               Maura Tim RN 09/30/21 12:12 PM  "

## 2021-10-02 ENCOUNTER — HEALTH MAINTENANCE LETTER (OUTPATIENT)
Age: 66
End: 2021-10-02

## 2021-10-08 ENCOUNTER — TRANSFERRED RECORDS (OUTPATIENT)
Dept: HEALTH INFORMATION MANAGEMENT | Facility: CLINIC | Age: 66
End: 2021-10-08

## 2021-10-21 ENCOUNTER — TRANSFERRED RECORDS (OUTPATIENT)
Dept: HEALTH INFORMATION MANAGEMENT | Facility: CLINIC | Age: 66
End: 2021-10-21
Payer: MEDICARE

## 2021-11-19 ENCOUNTER — TRANSFERRED RECORDS (OUTPATIENT)
Dept: HEALTH INFORMATION MANAGEMENT | Facility: CLINIC | Age: 66
End: 2021-11-19
Payer: MEDICARE

## 2021-11-29 ENCOUNTER — TRANSFERRED RECORDS (OUTPATIENT)
Dept: HEALTH INFORMATION MANAGEMENT | Facility: CLINIC | Age: 66
End: 2021-11-29
Payer: MEDICARE

## 2022-01-28 DIAGNOSIS — E78.2 MIXED HYPERLIPIDEMIA: Primary | ICD-10-CM

## 2022-01-30 NOTE — TELEPHONE ENCOUNTER
"Outpatient Medication Detail     Disp Refills Start End VELMA   simvastatin (ZOCOR) 20 MG tablet 90 tablet 3 9/29/2020  No   Sig - Route: Take 1 tablet (20 mg total) by mouth every evening. - Oral   Sent to pharmacy as: simvastatin 20 mg tablet (ZOCOR)   E-Prescribing Status: Receipt confirmed by pharmacy (9/29/2020  1:46 PM CDT)       simvastatin (ZOCOR) 20 MG tablet [[id number]]    Electronically signed by: Lamont Kapadia MD on 09/29/20 1345 Status: Active   Ordering user: Lamont Kapadia MD 09/29/20 1345 Authorized by: Lamont Kapadia MD   Frequency: QPM 09/29/20 - Until Discontinued Released by: Lamont Kapadia MD 09/29/20 1345   Diagnoses  Mixed hyperlipidemia [E78.2]     Former patient of Kang & has not established care with another provider.  Please assign refill request to covering provider per clinic standard process.    Routing refill request to provider for review/approval because:  A break in medication  No PCP    Last office visit provider:  7/28/21     Requested Prescriptions   Pending Prescriptions Disp Refills     simvastatin (ZOCOR) 20 MG tablet [Pharmacy Med Name: SIMVASTATIN 20MG TABLETS] 90 tablet      Sig: TAKE 1 TABLET(20 MG) BY MOUTH EVERY EVENING       Statins Protocol Passed - 1/28/2022 10:03 AM        Passed - LDL on file in past 12 months     Recent Labs   Lab Test 07/28/21  0858   *             Passed - No abnormal creatine kinase in past 12 months     No lab results found.             Passed - Recent (12 mo) or future (30 days) visit within the authorizing provider's specialty     Patient has had an office visit with the authorizing provider or a provider within the authorizing providers department within the previous 12 mos or has a future within next 30 days. See \"Patient Info\" tab in inbasket, or \"Choose Columns\" in Meds & Orders section of the refill encounter.              Passed - Medication is active on med list        Passed - Patient is age 18 or older    "     Passed - No active pregnancy on record        Passed - No positive pregnancy test in past 12 months             Neno Chun RN 01/30/22 3:22 PM

## 2022-01-31 RX ORDER — SIMVASTATIN 20 MG
TABLET ORAL
Qty: 90 TABLET | Refills: 1 | Status: SHIPPED | OUTPATIENT
Start: 2022-01-31 | End: 2022-07-26

## 2022-02-25 ENCOUNTER — TRANSFERRED RECORDS (OUTPATIENT)
Dept: HEALTH INFORMATION MANAGEMENT | Facility: CLINIC | Age: 67
End: 2022-02-25
Payer: MEDICARE

## 2022-03-03 ENCOUNTER — TRANSFERRED RECORDS (OUTPATIENT)
Dept: HEALTH INFORMATION MANAGEMENT | Facility: CLINIC | Age: 67
End: 2022-03-03
Payer: MEDICARE

## 2022-03-24 ENCOUNTER — OFFICE VISIT (OUTPATIENT)
Dept: FAMILY MEDICINE | Facility: CLINIC | Age: 67
End: 2022-03-24
Payer: MEDICARE

## 2022-03-24 VITALS
TEMPERATURE: 98.2 F | OXYGEN SATURATION: 99 % | RESPIRATION RATE: 16 BRPM | WEIGHT: 140 LBS | SYSTOLIC BLOOD PRESSURE: 124 MMHG | DIASTOLIC BLOOD PRESSURE: 64 MMHG | HEART RATE: 63 BPM | HEIGHT: 60 IN | BODY MASS INDEX: 27.48 KG/M2

## 2022-03-24 DIAGNOSIS — E55.9 VITAMIN D DEFICIENCY: ICD-10-CM

## 2022-03-24 DIAGNOSIS — Z76.89 ENCOUNTER TO ESTABLISH CARE: Primary | ICD-10-CM

## 2022-03-24 DIAGNOSIS — E78.2 MIXED HYPERLIPIDEMIA: ICD-10-CM

## 2022-03-24 DIAGNOSIS — F41.9 ANXIETY: ICD-10-CM

## 2022-03-24 DIAGNOSIS — E03.9 ACQUIRED HYPOTHYROIDISM: ICD-10-CM

## 2022-03-24 DIAGNOSIS — I10 ESSENTIAL HYPERTENSION: ICD-10-CM

## 2022-03-24 DIAGNOSIS — M17.11 PRIMARY OSTEOARTHRITIS OF RIGHT KNEE: ICD-10-CM

## 2022-03-24 DIAGNOSIS — G40.209 LOCALIZATION-RELATED SYMPTOMATIC EPILEPSY AND EPILEPTIC SYNDROMES WITH COMPLEX PARTIAL SEIZURES, NOT INTRACTABLE, WITHOUT STATUS EPILEPTICUS (H): ICD-10-CM

## 2022-03-24 LAB
ALBUMIN SERPL-MCNC: 3.9 G/DL (ref 3.5–5)
ALP SERPL-CCNC: 115 U/L (ref 45–120)
ALT SERPL W P-5'-P-CCNC: 18 U/L (ref 0–45)
ANION GAP SERPL CALCULATED.3IONS-SCNC: 12 MMOL/L (ref 5–18)
AST SERPL W P-5'-P-CCNC: 12 U/L (ref 0–40)
BILIRUB SERPL-MCNC: 2.1 MG/DL (ref 0–1)
BUN SERPL-MCNC: 16 MG/DL (ref 8–22)
CALCIUM SERPL-MCNC: 9.4 MG/DL (ref 8.5–10.5)
CHLORIDE BLD-SCNC: 107 MMOL/L (ref 98–107)
CHOLEST SERPL-MCNC: 189 MG/DL
CO2 SERPL-SCNC: 26 MMOL/L (ref 22–31)
CREAT SERPL-MCNC: 0.59 MG/DL (ref 0.6–1.1)
ERYTHROCYTE [DISTWIDTH] IN BLOOD BY AUTOMATED COUNT: 13.7 % (ref 10–15)
FASTING STATUS PATIENT QL REPORTED: YES
GFR SERPL CREATININE-BSD FRML MDRD: >90 ML/MIN/1.73M2
GLUCOSE BLD-MCNC: 103 MG/DL (ref 70–125)
HCT VFR BLD AUTO: 45.2 % (ref 35–47)
HDLC SERPL-MCNC: 67 MG/DL
HGB BLD-MCNC: 14.5 G/DL (ref 11.7–15.7)
LDLC SERPL CALC-MCNC: 97 MG/DL
MCH RBC QN AUTO: 26 PG (ref 26.5–33)
MCHC RBC AUTO-ENTMCNC: 32.1 G/DL (ref 31.5–36.5)
MCV RBC AUTO: 81 FL (ref 78–100)
PLATELET # BLD AUTO: 235 10E3/UL (ref 150–450)
POTASSIUM BLD-SCNC: 3.7 MMOL/L (ref 3.5–5)
PROT SERPL-MCNC: 7.2 G/DL (ref 6–8)
RBC # BLD AUTO: 5.57 10E6/UL (ref 3.8–5.2)
SODIUM SERPL-SCNC: 145 MMOL/L (ref 136–145)
TRIGL SERPL-MCNC: 125 MG/DL
TSH SERPL DL<=0.005 MIU/L-ACNC: 0.26 UIU/ML (ref 0.3–5)
VIT B12 SERPL-MCNC: 441 PG/ML (ref 213–816)
WBC # BLD AUTO: 4.5 10E3/UL (ref 4–11)

## 2022-03-24 PROCEDURE — 84443 ASSAY THYROID STIM HORMONE: CPT | Performed by: FAMILY MEDICINE

## 2022-03-24 PROCEDURE — 80053 COMPREHEN METABOLIC PANEL: CPT | Performed by: FAMILY MEDICINE

## 2022-03-24 PROCEDURE — 82607 VITAMIN B-12: CPT | Performed by: FAMILY MEDICINE

## 2022-03-24 PROCEDURE — 99214 OFFICE O/P EST MOD 30 MIN: CPT | Performed by: FAMILY MEDICINE

## 2022-03-24 PROCEDURE — 85027 COMPLETE CBC AUTOMATED: CPT | Performed by: FAMILY MEDICINE

## 2022-03-24 PROCEDURE — 36415 COLL VENOUS BLD VENIPUNCTURE: CPT | Performed by: FAMILY MEDICINE

## 2022-03-24 PROCEDURE — 82306 VITAMIN D 25 HYDROXY: CPT | Performed by: FAMILY MEDICINE

## 2022-03-24 PROCEDURE — 80061 LIPID PANEL: CPT | Performed by: FAMILY MEDICINE

## 2022-03-24 RX ORDER — IBUPROFEN 800 MG/1
800 TABLET, FILM COATED ORAL EVERY 8 HOURS PRN
Qty: 60 TABLET | Refills: 0 | Status: SHIPPED | OUTPATIENT
Start: 2022-03-24 | End: 2022-09-16

## 2022-03-24 RX ORDER — ALPRAZOLAM 0.25 MG
0.25 TABLET ORAL DAILY PRN
Qty: 8 TABLET | Refills: 0 | Status: SHIPPED | OUTPATIENT
Start: 2022-03-24

## 2022-03-24 RX ORDER — ALPRAZOLAM 0.25 MG
TABLET ORAL
Qty: 30 TABLET | Refills: 0 | Status: CANCELLED | OUTPATIENT
Start: 2022-03-24

## 2022-03-24 RX ORDER — LAMOTRIGINE 100 MG/1
TABLET ORAL
Qty: 540 TABLET | Refills: 1 | Status: SHIPPED | OUTPATIENT
Start: 2022-03-24 | End: 2023-10-12

## 2022-03-24 NOTE — PROGRESS NOTES
Assessment & Plan     (Z76.89) Encounter to establish care  (primary encounter diagnosis)  Comment:   Plan: REVIEW OF HEALTH MAINTENANCE PROTOCOL ORDERS,         Vitamin B12            (I10) Essential hypertension  Comment: normotensive  Continue current management on amlodipine     Plan: Comprehensive metabolic panel (BMP + Alb, Alk         Phos, ALT, AST, Total. Bili, TP)            (F41.9) Anxiety  Comment: #8 tabs are expected to last for about 3 months   Plan: ALPRAZolam (XANAX) 0.25 MG tablet            (E03.9) Acquired hypothyroidism  Comment: on levothyroxin 88 mcg  Plan: TSH            (M17.11) Primary osteoarthritis of right knee  Comment: patient understands not to take with Celebrex on the same day   Plan: ibuprofen (ADVIL/MOTRIN) 800 MG tablet            (E78.2) Mixed hyperlipidemia  Comment: on simvastatin 20 mg   Plan: Lipid panel reflex to direct LDL Fasting            (G40.209) Localization-related symptomatic epilepsy and epileptic syndromes with complex partial seizures, not intractable, without status epilepticus (H)  Comment:   Plan: CBC with platelets, lamoTRIgine (LAMICTAL) 100         MG tablet            (E55.9) Vitamin D deficiency  Comment:   Plan: Vitamin D Deficiency                             No follow-ups on file.    Juanjose Forrest MD  [facility]    Jenny Mares is a 66 year old who presents for health care establishment.  Former patient of Dr. Kapadia, and would like an order for Ibuprofen 800 mg taking for right knee pain with a history of meniscus tear and Osteoarthritis.    She has been taking lower doses intermittently.   She is currently in PHYSICAL THERAPY.       Also requesting refill of her Xanax , and lamictal ( has not taken for 2 yrs since ran out and not following up with the neurologist ), and requesting for any lab studies due to be done.       History of Present Illness       Reason for visit:  6 months follow up.  Symptom onset:  3-4  weeks ago  Symptom intensity:  Severe  Symptom progression:  Improving  Had these symptoms before:  Yes  Has tried/received treatment for these symptoms:  Yes  Previous treatment was successful:  Yes  Prior treatment description:  Meniscus tear, osteoarthritis    She eats 2-3 servings of fruits and vegetables daily.She consumes 1 sweetened beverage(s) daily.She exercises with enough effort to increase her heart rate 10 to 19 minutes per day.  She exercises with enough effort to increase her heart rate 4 days per week.   She is taking medications regularly.       Review of Systems         Objective    /64   Pulse 63   Temp 98.2  F (36.8  C)   Resp 16   Ht 1.524 m (5')   Wt 63.5 kg (140 lb)   SpO2 99%   BMI 27.34 kg/m    Body mass index is 27.34 kg/m .       Physical Exam   GENERAL: healthy, alert and no distress  NECK: no adenopathy, no asymmetry, masses, or scars and thyroid normal to palpation  RESP: lungs clear to auscultation - no rales, rhonchi or wheezes  CV: regular rate and rhythm, normal S1 S2, no S3 or S4, no murmur, click or rub, no peripheral edema and peripheral pulses strong  MS: right knee in a brace, no other gross musculoskeletal defects noted, no edema

## 2022-03-25 LAB — DEPRECATED CALCIDIOL+CALCIFEROL SERPL-MC: 32 UG/L (ref 20–75)

## 2022-03-30 RX ORDER — LEVOTHYROXINE SODIUM 75 UG/1
75 TABLET ORAL DAILY
Qty: 60 TABLET | Refills: 0 | Status: SHIPPED | OUTPATIENT
Start: 2022-03-30 | End: 2022-06-01

## 2022-04-28 DIAGNOSIS — I10 ESSENTIAL HYPERTENSION: Primary | ICD-10-CM

## 2022-04-30 NOTE — TELEPHONE ENCOUNTER
"Routing refill request to provider for review/approval because:  Labs out of range:  Cr    Last Written Prescription Date:  8/24/21  Last Fill Quantity: 180,  # refills: 1   Last office visit provider:  3/24/22     Requested Prescriptions   Pending Prescriptions Disp Refills     amLODIPine (NORVASC) 5 MG tablet 180 tablet 1     Sig: TAKE 2 TABLETS(10 MG) BY MOUTH DAILY       Calcium Channel Blockers Protocol  Failed - 4/28/2022 10:37 AM        Failed - Normal serum creatinine on file in past 12 months     Recent Labs   Lab Test 03/24/22  0956   CR 0.59*       Ok to refill medication if creatinine is low          Passed - Blood pressure under 140/90 in past 12 months     BP Readings from Last 3 Encounters:   03/24/22 124/64   07/28/21 122/72   06/23/21 120/62                 Passed - Recent (12 mo) or future (30 days) visit within the authorizing provider's specialty     Patient has had an office visit with the authorizing provider or a provider within the authorizing providers department within the previous 12 mos or has a future within next 30 days. See \"Patient Info\" tab in inbasket, or \"Choose Columns\" in Meds & Orders section of the refill encounter.              Passed - Medication is active on med list        Passed - Patient is age 18 or older        Passed - No active pregnancy on record        Passed - No positive pregnancy test in past 12 months             Deloris Rivas 04/30/22 3:59 PM  "

## 2022-05-02 RX ORDER — AMLODIPINE BESYLATE 10 MG/1
10 TABLET ORAL DAILY
Qty: 90 TABLET | Refills: 1 | Status: SHIPPED | OUTPATIENT
Start: 2022-05-02 | End: 2022-09-16

## 2022-05-02 RX ORDER — AMLODIPINE BESYLATE 5 MG/1
TABLET ORAL
Qty: 180 TABLET | Refills: 1 | Status: CANCELLED | OUTPATIENT
Start: 2022-05-02

## 2022-05-02 NOTE — TELEPHONE ENCOUNTER
Please call patient and ask if she would rather prefer taking one 10 mg tab vs taking two 5's as she is currently doing.

## 2022-05-26 DIAGNOSIS — E03.9 ACQUIRED HYPOTHYROIDISM: ICD-10-CM

## 2022-06-01 NOTE — TELEPHONE ENCOUNTER
"Routing refill request to provider for review/approval because:  Failed - Normal TSH on file in past 12 months    Last Written Prescription Date:  3/30/2022  Last Fill Quantity: 60,  # refills: 0   Last office visit provider:  3/4/2022     Requested Prescriptions   Pending Prescriptions Disp Refills     levothyroxine (SYNTHROID/LEVOTHROID) 75 MCG tablet 60 tablet 0     Sig: Take 1 tablet (75 mcg) by mouth daily Recheck thyroid level in 4-6 weeks ( before running out )       Thyroid Protocol Failed - 6/1/2022  8:34 AM        Failed - Normal TSH on file in past 12 months     Recent Labs   Lab Test 03/24/22  0956   TSH 0.26*              Passed - Patient is 12 years or older        Passed - Recent (12 mo) or future (30 days) visit within the authorizing provider's specialty     Patient has had an office visit with the authorizing provider or a provider within the authorizing providers department within the previous 12 mos or has a future within next 30 days. See \"Patient Info\" tab in inbasket, or \"Choose Columns\" in Meds & Orders section of the refill encounter.              Passed - Medication is active on med list        Passed - No active pregnancy on record     If patient is pregnant or has had a positive pregnancy test, please check TSH.          Passed - No positive pregnancy test in past 12 months     If patient is pregnant or has had a positive pregnancy test, please check TSH.               Lia Reed RN 06/01/22 10:12 AM  "

## 2022-06-01 NOTE — TELEPHONE ENCOUNTER
Please call to advise and remind patient to recheck thyroid level before a refill can be authorized.

## 2022-06-03 NOTE — TELEPHONE ENCOUNTER
Left message for pt to call back- please see note from Dr NETTLES below & assist in scheduling lab only visit (order in chart)

## 2022-06-06 NOTE — TELEPHONE ENCOUNTER
Contacted patient and scheduled her for lab only on 6/10.  Patient does have enough medication to get to her lab only apt.

## 2022-06-10 RX ORDER — LEVOTHYROXINE SODIUM 75 UG/1
75 TABLET ORAL DAILY
Qty: 60 TABLET | Refills: 0 | Status: SHIPPED | OUTPATIENT
Start: 2022-06-10 | End: 2022-07-06

## 2022-07-05 DIAGNOSIS — I10 ESSENTIAL HYPERTENSION: ICD-10-CM

## 2022-07-05 DIAGNOSIS — E03.9 ACQUIRED HYPOTHYROIDISM: ICD-10-CM

## 2022-07-05 NOTE — TELEPHONE ENCOUNTER
Reason for Call:  Medication or medication refill:    Do you use a [facility]?  Name of the pharmacy and phone number for the current request:  Pharmacy pended    Name of the medication requested: Amlodipine and levothyroxine    Other request: Has follow-up schedule 9/16/22. Will need refills before follow-up. Ok to come in for blood work if needed.    Can we leave a detailed message on this number? YES    Phone number patient can be reached at: Cell number on file:    Telephone Information:   Mobile [phone]       Best Time: anytime    Call taken on 7/5/2022 at 9:16 AM by Kori Ruby

## 2022-07-06 RX ORDER — AMLODIPINE BESYLATE 5 MG/1
TABLET ORAL
Qty: 180 TABLET | Refills: 1 | Status: SHIPPED | OUTPATIENT
Start: 2022-07-06 | End: 2022-11-29

## 2022-07-06 RX ORDER — LEVOTHYROXINE SODIUM 75 UG/1
75 TABLET ORAL DAILY
Qty: 60 TABLET | Refills: 0 | Status: SHIPPED | OUTPATIENT
Start: 2022-07-06 | End: 2022-11-01

## 2022-07-26 DIAGNOSIS — E78.2 MIXED HYPERLIPIDEMIA: ICD-10-CM

## 2022-07-26 RX ORDER — SIMVASTATIN 20 MG
TABLET ORAL
Qty: 90 TABLET | Refills: 2 | Status: SHIPPED | OUTPATIENT
Start: 2022-07-26 | End: 2023-08-03

## 2022-07-26 NOTE — TELEPHONE ENCOUNTER
"Last Written Prescription Date:  1/31/22  Last Fill Quantity: 90,  # refills: 1   Last office visit provider:  3/24/22     Requested Prescriptions   Pending Prescriptions Disp Refills     simvastatin (ZOCOR) 20 MG tablet [Pharmacy Med Name: SIMVASTATIN 20MG TABLETS] 90 tablet 1     Sig: TAKE 1 TABLET(20 MG) BY MOUTH EVERY EVENING       Statins Protocol Passed - 7/26/2022  9:48 AM        Passed - LDL on file in past 12 months     Recent Labs   Lab Test 03/24/22  0956   LDL 97             Passed - No abnormal creatine kinase in past 12 months     No lab results found.             Passed - Recent (12 mo) or future (30 days) visit within the authorizing provider's specialty     Patient has had an office visit with the authorizing provider or a provider within the authorizing providers department within the previous 12 mos or has a future within next 30 days. See \"Patient Info\" tab in inbasket, or \"Choose Columns\" in Meds & Orders section of the refill encounter.              Passed - Medication is active on med list        Passed - Patient is age 18 or older        Passed - No active pregnancy on record        Passed - No positive pregnancy test in past 12 months             Neno Chun RN 07/26/22 9:48 AM  "

## 2022-09-03 ENCOUNTER — HEALTH MAINTENANCE LETTER (OUTPATIENT)
Age: 67
End: 2022-09-03

## 2022-09-16 ENCOUNTER — HOSPITAL ENCOUNTER (OUTPATIENT)
Dept: GENERAL RADIOLOGY | Facility: HOSPITAL | Age: 67
Discharge: HOME | End: 2022-09-16
Attending: STUDENT IN AN ORGANIZED HEALTH CARE EDUCATION/TRAINING PROGRAM | Admitting: STUDENT IN AN ORGANIZED HEALTH CARE EDUCATION/TRAINING PROGRAM
Payer: MEDICARE

## 2022-09-16 ENCOUNTER — OFFICE VISIT (OUTPATIENT)
Dept: FAMILY MEDICINE | Facility: CLINIC | Age: 67
End: 2022-09-16
Payer: MEDICARE

## 2022-09-16 VITALS
SYSTOLIC BLOOD PRESSURE: 122 MMHG | HEIGHT: 60 IN | WEIGHT: 136.13 LBS | BODY MASS INDEX: 26.73 KG/M2 | DIASTOLIC BLOOD PRESSURE: 66 MMHG | HEART RATE: 60 BPM | OXYGEN SATURATION: 95 %

## 2022-09-16 DIAGNOSIS — D32.9 MENINGIOMA (H): ICD-10-CM

## 2022-09-16 DIAGNOSIS — M25.562 ACUTE PAIN OF LEFT KNEE: ICD-10-CM

## 2022-09-16 DIAGNOSIS — E80.6 HYPERBILIRUBINEMIA: ICD-10-CM

## 2022-09-16 DIAGNOSIS — E03.9 HYPOTHYROIDISM, UNSPECIFIED TYPE: ICD-10-CM

## 2022-09-16 DIAGNOSIS — I10 ESSENTIAL HYPERTENSION: ICD-10-CM

## 2022-09-16 DIAGNOSIS — Z12.31 VISIT FOR SCREENING MAMMOGRAM: ICD-10-CM

## 2022-09-16 DIAGNOSIS — M25.562 ACUTE PAIN OF LEFT KNEE: Primary | ICD-10-CM

## 2022-09-16 DIAGNOSIS — M17.11 PRIMARY OSTEOARTHRITIS OF RIGHT KNEE: ICD-10-CM

## 2022-09-16 DIAGNOSIS — E78.5 HYPERLIPIDEMIA LDL GOAL <130: ICD-10-CM

## 2022-09-16 PROBLEM — M94.9 DISORDER OF BONE AND CARTILAGE: Status: ACTIVE | Noted: 2022-09-16

## 2022-09-16 PROBLEM — D32.0 BENIGN NEOPLASM OF CEREBRAL MENINGES (H): Status: ACTIVE | Noted: 2022-09-16

## 2022-09-16 PROBLEM — G60.9 HEREDITARY AND IDIOPATHIC PERIPHERAL NEUROPATHY: Status: ACTIVE | Noted: 2022-09-16

## 2022-09-16 PROBLEM — G56.00 CARPAL TUNNEL SYNDROME: Status: ACTIVE | Noted: 2022-09-16

## 2022-09-16 PROBLEM — M89.9 DISORDER OF BONE AND CARTILAGE: Status: ACTIVE | Noted: 2022-09-16

## 2022-09-16 PROBLEM — G57.60 MORTON'S NEUROMA: Status: ACTIVE | Noted: 2018-06-04

## 2022-09-16 LAB
ALBUMIN SERPL BCG-MCNC: 4.4 G/DL (ref 3.5–5.2)
ALP SERPL-CCNC: 151 U/L (ref 35–104)
ALT SERPL W P-5'-P-CCNC: 16 U/L (ref 10–35)
AST SERPL W P-5'-P-CCNC: 18 U/L (ref 10–35)
BILIRUB DIRECT SERPL-MCNC: 0.27 MG/DL (ref 0–0.3)
BILIRUB SERPL-MCNC: 1.5 MG/DL
PROT SERPL-MCNC: 7.6 G/DL (ref 6.4–8.3)
T4 FREE SERPL-MCNC: 1.49 NG/DL (ref 0.9–1.7)
TSH SERPL DL<=0.005 MIU/L-ACNC: 4.34 UIU/ML (ref 0.3–4.2)

## 2022-09-16 PROCEDURE — 84443 ASSAY THYROID STIM HORMONE: CPT | Performed by: STUDENT IN AN ORGANIZED HEALTH CARE EDUCATION/TRAINING PROGRAM

## 2022-09-16 PROCEDURE — 99214 OFFICE O/P EST MOD 30 MIN: CPT | Mod: 25 | Performed by: STUDENT IN AN ORGANIZED HEALTH CARE EDUCATION/TRAINING PROGRAM

## 2022-09-16 PROCEDURE — 36415 COLL VENOUS BLD VENIPUNCTURE: CPT | Performed by: STUDENT IN AN ORGANIZED HEALTH CARE EDUCATION/TRAINING PROGRAM

## 2022-09-16 PROCEDURE — G0008 ADMIN INFLUENZA VIRUS VAC: HCPCS | Performed by: STUDENT IN AN ORGANIZED HEALTH CARE EDUCATION/TRAINING PROGRAM

## 2022-09-16 PROCEDURE — 0124A COVID-19,PF,PFIZER BOOSTER BIVALENT: CPT | Performed by: STUDENT IN AN ORGANIZED HEALTH CARE EDUCATION/TRAINING PROGRAM

## 2022-09-16 PROCEDURE — 90662 IIV NO PRSV INCREASED AG IM: CPT | Performed by: STUDENT IN AN ORGANIZED HEALTH CARE EDUCATION/TRAINING PROGRAM

## 2022-09-16 PROCEDURE — 73562 X-RAY EXAM OF KNEE 3: CPT | Mod: LT,FY

## 2022-09-16 PROCEDURE — 91312 COVID-19,PF,PFIZER BOOSTER BIVALENT: CPT | Performed by: STUDENT IN AN ORGANIZED HEALTH CARE EDUCATION/TRAINING PROGRAM

## 2022-09-16 PROCEDURE — 84439 ASSAY OF FREE THYROXINE: CPT | Performed by: STUDENT IN AN ORGANIZED HEALTH CARE EDUCATION/TRAINING PROGRAM

## 2022-09-16 PROCEDURE — 80076 HEPATIC FUNCTION PANEL: CPT | Performed by: STUDENT IN AN ORGANIZED HEALTH CARE EDUCATION/TRAINING PROGRAM

## 2022-09-16 RX ORDER — IBUPROFEN 800 MG/1
800 TABLET, FILM COATED ORAL EVERY 8 HOURS PRN
Qty: 60 TABLET | Refills: 0 | Status: SHIPPED | OUTPATIENT
Start: 2022-09-16 | End: 2024-03-29

## 2022-09-16 ASSESSMENT — PAIN SCALES - GENERAL: PAINLEVEL: MILD PAIN (2)

## 2022-09-16 NOTE — PROGRESS NOTES
Assessment & Plan         ICD-10-CM    1. Acute pain of left knee  M25.562 XR Knee Left 3 Views     Physical Therapy Referral   2. Visit for screening mammogram  Z12.31    3. Primary osteoarthritis of right knee  M17.11 ibuprofen (ADVIL/MOTRIN) 800 MG tablet    patient understands not to take with Celebrex on the same day    4. Hypothyroidism, unspecified type  E03.9 TSH with free T4 reflex   5. Hyperbilirubinemia  E80.6 US Abdomen Limited     Hepatic panel (Albumin, ALT, AST, Bili, Alk Phos, TP)   6. Hyperlipidemia LDL goal <130  E78.5    7. Essential hypertension  I10    8. Meningioma (H)  D32.9          Left knee pain:   Patient has history of right knee pain 2/2 meniscal tear that got better with PT  About 3 months ago, started to experience left-sided knee pain.  Had been experiencing swelling, pain and felt like her knee was going to give out at times.  Also felt like her knee was locking up.  The pain is largely along the medial aspect of the knee.  No trauma.  Went to [facility] where they took x-rays and gave her steroid injection.  The injection helped a little bit but the pain is still there.  Does not have a follow-up with them.  Plan:  - Physical exam is consistent with tenderness along the medial aspect of the left knee  - Concern for possible meniscal tear-?  Degenerative vs OA flare.  I do not have access to the imaging from [facility].  We will get x-ray today to get baseline.  - We will send to physical therapy  - If no improvement with physical therapy, would offer MRI of the knee  - Okay to use ibuprofen as needed but cautioned to be judicious about it    Hypothyroidism:   Last TSH was low at 0.26 in 3/2022 and dose change was suggested. Doesn't look like patient changed her dosage.  We will recheck today and then decide if there is any changes in her meds that are needed.    Essential hypertension:  Normotensive in the office.  On amlodipine 5 mg.  Patient wants to discontinue  "the medication altogether.  Would not advise that necessarily at this point but patient seems quite insistent.  Told her to come off the medication for 2 to 3 days at home and check her blood pressures consistently.  If she is above 130/80, should go back on the amlodipine.  Patient verbalized understanding.    HLD:  Patient wanted to come off her cholesterol medication.  Reviewed her ASCVD score with her-is 8.6, above average risk.  Given advanced age, HTN and hypothyroidism, would continue the medication.  Patient amenable.  Up-to-date on her lipid profile    Hyperbilirubinemia: Slightly uptrending over the last few years.  No dedicated liver imaging on file.  Ordered today.  Patient is not a heavy drinker.  Is s/p cholecystectomy.    Meningioma:  Patient follows with [facility] neurology  Is s/p resection x2 (had recurrence in 2018).  Has been lost to follow-up due to COVID.  Strongly encouraged her to follow-up with her neurosurgeon.  She says that she will call them.  Is currently asymptomatic.      Patient wanted flu shot and bivalent COVID.  Ordered.      36 minutes spent on the date of the encounter doing chart review, history and exam, documentation and further activities per the note    Return in about 3 months (around 12/16/2022) for with PCP for thyroid.    Angela Lacey [facility]    Subjective      66-year-old female with PMH of hypothyroidism, right knee pain, meningioma s/p resection x2, allergic rhinitis, osteopenia, GERD, peripheral neuropathy, seizure disorder, HTN, HLD who presents today for follow-up on left knee pain, hypothyroidism and medication management.    Patient is concerned that she is \"on too many medications\" and she would like to get off of them.  Specifically, she would like to know if she should come off her thyroid medication, her cholesterol medication and her blood pressure medication.    Review of Systems   As per HPI       Objective "    /66 (BP Location: Right arm, Patient Position: Sitting, Cuff Size: Adult Regular)   Pulse 60   Ht 1.524 m (5')   Wt 61.7 kg (136 lb 2 oz)   SpO2 95%   BMI 26.59 kg/m    Body mass index is 26.59 kg/m .  Physical Exam   GENERAL: healthy, alert and no distress  NECK: no adenopathy, no asymmetry, masses, or scars and thyroid normal to palpation  RESP: lungs clear to auscultation - no rales, rhonchi or wheezes  CV: regular rate and rhythm, normal S1 S2, no S3 or S4, no murmur, click or rub, no peripheral edema and peripheral pulses strong  ABDOMEN: soft, nontender, no hepatosplenomegaly, no masses and bowel sounds normal  MS: no gross musculoskeletal defects noted, no edema  Slight limp with walking, favoring right side  No obvious effusion  No ligament laxity  Patient has tenderness along the medial aspect of the knee that is reproducible  PSYCH: mentation appears normal, affect normal/bright

## 2022-09-19 ENCOUNTER — TELEPHONE (OUTPATIENT)
Dept: FAMILY MEDICINE | Facility: CLINIC | Age: 67
End: 2022-09-19

## 2022-09-19 DIAGNOSIS — E03.8 SUBCLINICAL HYPOTHYROIDISM: Primary | ICD-10-CM

## 2022-09-19 DIAGNOSIS — M25.562 LEFT MEDIAL KNEE PAIN: Primary | ICD-10-CM

## 2022-09-19 DIAGNOSIS — R29.898 CLICKING KNEE: ICD-10-CM

## 2022-09-19 NOTE — TELEPHONE ENCOUNTER
Called patient and informed her of the results below. Also got patient a lab only appointment scheduled in November to have TSH rechecked.

## 2022-09-19 NOTE — TELEPHONE ENCOUNTER
----- Message from Angela Lacey DO sent at 9/19/2022  5:50 AM CDT -----    Your Bilirubin has come down from last time it was checked but it is still elevated, will follow up on the results of the liver ultrasound.      Your TSH is slightly up but below the threshold for medication. Would like you to come in for lab only appt and get it recheck. Call to make 2 month lab only appt

## 2022-09-23 ENCOUNTER — HOSPITAL ENCOUNTER (OUTPATIENT)
Dept: ULTRASOUND IMAGING | Facility: HOSPITAL | Age: 67
Discharge: HOME | End: 2022-09-23
Attending: STUDENT IN AN ORGANIZED HEALTH CARE EDUCATION/TRAINING PROGRAM | Admitting: STUDENT IN AN ORGANIZED HEALTH CARE EDUCATION/TRAINING PROGRAM
Payer: MEDICARE

## 2022-09-23 DIAGNOSIS — E80.6 HYPERBILIRUBINEMIA: ICD-10-CM

## 2022-09-23 PROCEDURE — 76705 ECHO EXAM OF ABDOMEN: CPT

## 2022-09-27 ENCOUNTER — TELEPHONE (OUTPATIENT)
Dept: INTERNAL MEDICINE | Facility: CLINIC | Age: 67
End: 2022-09-27

## 2022-09-27 NOTE — TELEPHONE ENCOUNTER
Called patient and informed her of the results/message below as well as the other results from other encounters. Patient had no further questions and will get her labs redrawn next week when she goes in for her MRI.

## 2022-09-27 NOTE — TELEPHONE ENCOUNTER
----- Message from Angela Lacey DO sent at 9/27/2022  6:17 AM CDT -----  My[facility] message not read. Please call x 2 and if no response, please send letter.     Govind Mares,      Your Bilirubin has come down from last time it was checked but it is still elevated, will follow up on the results of the liver ultrasound.      Your TSH is slightly up but below the threshold for medication. Would like you to come in for lab only appt and get it recheck.      Angela Lacey DO

## 2022-10-06 ENCOUNTER — HOSPITAL ENCOUNTER (OUTPATIENT)
Dept: MRI IMAGING | Facility: HOSPITAL | Age: 67
Discharge: HOME | End: 2022-10-06
Attending: STUDENT IN AN ORGANIZED HEALTH CARE EDUCATION/TRAINING PROGRAM | Admitting: STUDENT IN AN ORGANIZED HEALTH CARE EDUCATION/TRAINING PROGRAM
Payer: MEDICARE

## 2022-10-06 DIAGNOSIS — M25.562 LEFT MEDIAL KNEE PAIN: ICD-10-CM

## 2022-10-06 DIAGNOSIS — R29.898 CLICKING KNEE: ICD-10-CM

## 2022-10-06 PROCEDURE — G1010 CDSM STANSON: HCPCS

## 2022-10-10 DIAGNOSIS — S82.142A CLOSED FRACTURE OF LEFT TIBIAL PLATEAU, INITIAL ENCOUNTER: Primary | ICD-10-CM

## 2022-10-10 DIAGNOSIS — S83.242A TEAR OF MEDIAL MENISCUS OF LEFT KNEE, CURRENT, UNSPECIFIED TEAR TYPE, INITIAL ENCOUNTER: ICD-10-CM

## 2022-10-11 ENCOUNTER — TELEPHONE (OUTPATIENT)
Dept: FAMILY MEDICINE | Facility: CLINIC | Age: 67
End: 2022-10-11

## 2022-10-11 NOTE — TELEPHONE ENCOUNTER
Voicemail left for patient to return call to clinic.     Please relay Dr. Lacey's message below in regards to her MRI Knee result.     Thank you

## 2022-10-11 NOTE — TELEPHONE ENCOUNTER
----- Message from Angela Lacey DO sent at 10/10/2022  6:38 AM CDT -----  Call patient to make sure she got the message:     Suzan,     Your knee MRI shows that you have tear of the medial meniscus in the left knee and a small fractures of the tibia ( bone below the knee). You also have a fluid cyst in the back of your knee that looks like it might have recently ruptured. These are all contirbuting to your pain. Would like you to be seen by ortho ASAP. Have put in the order. Let me know if you don't hear from them. You can also call them at this number to schedule: [phone]    Angela Lacey DO

## 2022-10-21 ENCOUNTER — HOSPITAL ENCOUNTER (OUTPATIENT)
Dept: PHYSICAL THERAPY | Facility: REHABILITATION | Age: 67
Discharge: HOME | End: 2022-10-21
Attending: STUDENT IN AN ORGANIZED HEALTH CARE EDUCATION/TRAINING PROGRAM
Payer: MEDICARE

## 2022-10-21 DIAGNOSIS — M25.562 ACUTE PAIN OF LEFT KNEE: ICD-10-CM

## 2022-10-21 PROCEDURE — 97161 PT EVAL LOW COMPLEX 20 MIN: CPT | Mod: GP

## 2022-10-21 PROCEDURE — 97110 THERAPEUTIC EXERCISES: CPT | Mod: GP

## 2022-10-21 NOTE — PROGRESS NOTES
[facility]    OUTPATIENT PHYSICAL THERAPY ORTHOPEDIC EVALUATION  PLAN OF TREATMENT FOR OUTPATIENT REHABILITATION  (COMPLETE FOR INITIAL CLAIMS ONLY)  Patient's Last Name, First Name, M.I.  YOB: 1955  Suzan Ballard    Provider s Name:  [facility]   Medical Record No.  [id number]   Start of Care Date:  10/21/22   Onset Date:  09/16/22   Type:     _X__PT   ___OT   ___SLP Medical Diagnosis:  M25.562 (ICD-10-CM) - Acute pain of left knee     PT Diagnosis:  B knee pain   Visits from SOC:  1      _________________________________________________________________________________  Plan of Treatment/Functional Goals:  balance training, joint mobilization, manual therapy, ROM, strengthening, stretching, neuromuscular re-education           Goals  Goal Identifier: HEP/Self managment  Goal Description: Patient will be independent in self-management of condition and HEP to reach functional goals.  Target Date: 01/13/23    Goal Identifier: Walking  Goal Description: Pt will be able to walk 1 hour with pain level <2/10 to allow for shopping around mall  Target Date: 01/13/23    Goal Identifier: Bending  Goal Description: Pt will be able to pend and squat with pain level <2/10 for house chores.  Target Date: 01/13/23    Goal Identifier: Standing  Goal Description: Pt will be able to stand for 1 hour with pain level <2/10 to allow for cooking and meal prep  Target Date: 01/13/23                                                Therapy Frequency:  1 time/week  Predicted Duration of Therapy Intervention:  8 visits over 12 weeks    ROBERT HAMEED, PT                  10/21/22 1500   General Information   Type of Visit Initial OP Ortho PT Evaluation   Start of Care Date 10/21/22   Referring Physician Angela Lacey DO   Patient/Family Goals Statement TO be able to walk without pain    Orders Evaluate and Treat   Date of Order 09/16/22   Certification Required? Yes   Medical Diagnosis M25.562 (ICD-10-CM) - Acute pain of left knee   Surgical/Medical history reviewed Yes   Body Part(s)   Body Part(s) Knee   Presentation and Etiology   Pertinent history of current problem (include personal factors and/or comorbidities that impact the POC) Pt presents with bilateral knee pain R>L. This started 3 months prior with insidious onset, began clicking and then pain began. Reports cortisone shot in knees that has helped (shot from last year). Reports therapy at [facility] which helped resolved pain and returned to PCP to suggest follow up with PT again. Has xray and MRI in meantime.   Impairments A. Pain;D. Decreased ROM;C. Swelling;E. Decreased flexibility;F. Decreased strength and endurance;G. Impaired balance;H. Impaired gait;M. Locking or catching   Functional Limitations perform activities of daily living;perform desired leisure / sports activities   Symptom Location B medial knees with R>L   How/Where did it occur From insidious onset   Onset date of current episode/exacerbation 09/16/22   Chronicity Chronic   Pain rating (0-10 point scale) Best (/10);Worst (/10);Other   Best (/10) 3-4/10   Worst (/10) 8/10   Pain rating comment 4/10 currently   Pain quality B. Dull;C. Aching;A. Sharp;G. Cramping   Frequency of pain/symptoms A. Constant   Pain/symptoms are: The same all the time   Pain/symptoms exacerbated by B. Walking;I. Bending;G. Certain positions;A. Sitting  (Sitting for prolonged periods of time hard to get up. Stairs especially descending)   Pain/symptoms eased by J. Braces/supports;I. OTC medication(s);C. Rest;H. Cold;G. Heat   Progression of symptoms since onset: Worsened   Current / Previous Interventions   Diagnostic Tests: X-ray;MRI   Current Level of Function   Patient role/employment history F. Retired   Fall Risk Screen   Fall screen completed by PT   Have you fallen 2 or more times in the  past year? No   Have you fallen and had an injury in the past year? No   Is patient a fall risk? No   Abuse Screen (yes response referral indicated)   Feels Unsafe at Home or Work/School no   Feels Threatened by Someone no   Does Anyone Try to Keep You From Having Contact with Others or Doing Things Outside Your Home? no   Physical Signs of Abuse Present no   System Outcome Measures   Outcome Measures   (LEFS)   Knee Objective Findings   Gait/Locomotion Typical gait   Balance/Proprioception (Single Leg Stance) Pain with L SLS 3 sec, R LE 2 sec   Foot Position In Standing Neutral   Knee/Hip Strength Comments Pain with R knee quad activation   Palpation Tender to palpate along medial joint line, pes anserine patellar tendon on R knee   Accessory Motion/Joint Mobility patellar mobility WFL B   Lachmans Test -   Anterior Drawer Test -   Posterior Drawer Test -   Varus Stress Test -   Valgus Stress Test -, soreness with R knee   Breanna's Test -   Side (if bilateral, select both right and left) Right   Knee Special Test Comments + L thessalys   Right Knee Extension AROM WFL increased pain   Right Knee Extension PROM To neutral B   Right Knee Flexion AROM WFL, pain with R knee flexion   Right Knee Flexion PROM Full range B, pain with end range R knee   Right Knee Flexion Strength 3-/5 B, pain with R knee   Right Knee Extension Strength 3-/5 B, pain with R knee   Right Hip Abduction Strength 3-/5 B   Right Quad Set Strength 3/5 B   R VMO Strength 3/5 B   Planned Therapy Interventions   Planned Therapy Interventions balance training;joint mobilization;manual therapy;ROM;strengthening;stretching;neuromuscular re-education   Clinical Impression   Criteria for Skilled Therapeutic Interventions Met yes, treatment indicated   PT Diagnosis B knee pain   Influenced by the following impairments Pain, decreased strength and ROM, balance impairment, decreased activity tolerance   Functional limitations due to impairments Walking,  prolonged standing, rising from chair, kneeling   Clinical Presentation Stable/Uncomplicated   Clinical Presentation Rationale Clinical judgement   Clinical Decision Making (Complexity) Low complexity   Therapy Frequency 1 time/week   Predicted Duration of Therapy Intervention (days/wks) 8 visits over 12 weeks   Risk & Benefits of therapy have been explained Yes   Patient, Family & other staff in agreement with plan of care Yes   Clinical Impression Comments 65 yo female presents to PT with B knee pain, R>L. Has history of L knee meniscus tear which since then has improved with therapy and time. Returns for new bout of PT with R knee pain limiting walking, standing and rising from chair. Upon eval, pain reproduced with meniscal testing suggesting medial meniscus injury with associated inflammation. Symptoms additionally influenced by generalized weakness of proximal hip and gross LE muscles. Would benefit from skilled PT to progress independence towards PLOF   ORTHO GOALS   PT Ortho Eval Goals 1;2;3;4   Ortho Goal 1   Goal Identifier HEP/Self managment   Goal Description Patient will be independent in self-management of condition and HEP to reach functional goals.   Target Date 01/13/23   Ortho Goal 2   Goal Identifier Walking   Goal Description Pt will be able to walk 1 hour with pain level <2/10 to allow for shopping around mall   Target Date 01/13/23   Ortho Goal 3   Goal Identifier Bending   Goal Description Pt will be able to pend and squat with pain level <2/10 for house chores.   Target Date 01/13/23   Ortho Goal 4   Goal Identifier Standing   Goal Description Pt will be able to stand for 1 hour with pain level <2/10 to allow for cooking and meal prep   Target Date 01/13/23   Total Evaluation Time   PT Eval, Low Complexity Minutes (91417) 20   Therapy Certification   Certification date from 10/21/22   Certification date to 01/13/23   Medical Diagnosis M25.562 (ICD-10-CM) - Acute pain of left knee     I  CERTIFY THE NEED FOR THESE SERVICES FURNISHED UNDER        THIS PLAN OF TREATMENT AND WHILE UNDER MY CARE     (Physician co-signature of this document indicates review and certification of the therapy plan).                     Certification Date From:  10/21/22   Certification Date To:  01/13/23    Referring Provider:  Angela Lacey DO    Initial Assessment        See Epic Evaluation Start of Care Date: 10/21/22

## 2022-10-27 DIAGNOSIS — E03.9 ACQUIRED HYPOTHYROIDISM: ICD-10-CM

## 2022-10-28 ENCOUNTER — HOSPITAL ENCOUNTER (OUTPATIENT)
Dept: PHYSICAL THERAPY | Facility: REHABILITATION | Age: 67
Discharge: HOME | End: 2022-10-28
Attending: STUDENT IN AN ORGANIZED HEALTH CARE EDUCATION/TRAINING PROGRAM
Payer: MEDICARE

## 2022-10-28 PROCEDURE — 97110 THERAPEUTIC EXERCISES: CPT | Mod: GP

## 2022-10-28 RX ORDER — LEVOTHYROXINE SODIUM 75 UG/1
75 TABLET ORAL DAILY
Qty: 90 TABLET | Refills: 3 | Status: CANCELLED | OUTPATIENT
Start: 2022-10-28

## 2022-10-28 NOTE — TELEPHONE ENCOUNTER
"Routing refill request to provider for review/approval because:  Labs out of range:  TSH    Last Written Prescription Date:  7/6/22  Last Fill Quantity: 60,  # refills: 0   Last office visit provider:  9/16/22     Requested Prescriptions   Pending Prescriptions Disp Refills     levothyroxine (SYNTHROID/LEVOTHROID) 75 MCG tablet 60 tablet 0     Sig: Take 1 tablet (75 mcg) by mouth daily       Thyroid Protocol Failed - 10/28/2022  8:38 AM        Failed - Normal TSH on file in past 12 months     Recent Labs   Lab Test 09/16/22  1115   TSH 4.34*              Passed - Patient is 12 years or older        Passed - Recent (12 mo) or future (30 days) visit within the authorizing provider's specialty     Patient has had an office visit with the authorizing provider or a provider within the authorizing providers department within the previous 12 mos or has a future within next 30 days. See \"Patient Info\" tab in inbasket, or \"Choose Columns\" in Meds & Orders section of the refill encounter.              Passed - Medication is active on med list        Passed - No active pregnancy on record     If patient is pregnant or has had a positive pregnancy test, please check TSH.          Passed - No positive pregnancy test in past 12 months     If patient is pregnant or has had a positive pregnancy test, please check TSH.               Neno Chun RN 10/28/22 8:38 AM  "

## 2022-10-31 DIAGNOSIS — E03.9 ACQUIRED HYPOTHYROIDISM: ICD-10-CM

## 2022-11-01 RX ORDER — LEVOTHYROXINE SODIUM 75 UG/1
75 TABLET ORAL DAILY
Qty: 90 TABLET | Refills: 0 | Status: SHIPPED | OUTPATIENT
Start: 2022-11-01 | End: 2023-02-03

## 2022-11-04 ENCOUNTER — HOSPITAL ENCOUNTER (OUTPATIENT)
Dept: PHYSICAL THERAPY | Facility: REHABILITATION | Age: 67
Discharge: HOME | End: 2022-11-04
Attending: STUDENT IN AN ORGANIZED HEALTH CARE EDUCATION/TRAINING PROGRAM
Payer: MEDICARE

## 2022-11-04 PROCEDURE — 97110 THERAPEUTIC EXERCISES: CPT | Mod: GP

## 2022-11-04 NOTE — PROGRESS NOTES
[facility]    Outpatient Physical Therapy Discharge Note  Patient: Suzan Ballard  : 1955    Beginning/End Dates of Reporting Period:  10/21/22   to 22    Referring Provider: Angela Lacey DO Therapy Diagnosis: B knee pain     Client Self Report: Pt arrives today reporting L knee pain is good rating, no pain and R knee is at 4-5/10 pain. Continues to be performing HEP and feels that if were to not perform would have higher pain. More pain with getting up, thus avoiding prolonged sitting. Pt reports feeling confident in performing HEP regularly and wanting to independently manage knee pain after this visit. Very happy with progression of therapy for L knee pain.    See Below      22 1500   Signing Clinician's Name / Credentials   Signing clinician's name / credentials Miranda Hunt, PT, DPT   Session Number   Session Number 3/8   Progress Note/Recertification   Recertification Due Date 23   Adult Goals   PT Ortho Eval Goals 1;2;3;4   Ortho Goal 1   Goal Identifier HEP/Self managment   Goal Description Patient will be independent in self-management of condition and HEP to reach functional goals.   Goal Progress In progress   Target Date 23   Ortho Goal 2   Goal Identifier Walking   Goal Description Pt will be able to walk 1 hour with pain level <2/10 to allow for shopping around mall   Goal Progress No L knee pain with walking for an hour.   Target Date 23   Date Met 22   Ortho Goal 3   Goal Identifier Bending   Goal Description Pt will be able to bend and squat with pain level <2/10 for house chores.   Goal Progress In progress - unable to get up if were to kneel   Target Date 23   Ortho Goal 4   Goal Identifier Standing   Goal Description Pt will be able to stand for 1 hour with pain level <2/10 to allow for cooking and meal prep   Goal Progress L knee able to  stand for an hour, reports feeling stronger   Target Date 01/13/23   Date Met 11/04/22   Subjective Report   Subjective Report Pt arrives today reporting L knee pain is good rating, no pain and R knee is at 4-5/10 pain. Continues to be performing HEP and feels that if were to not perform would have higher pain. More pain with getting up, thus avoiding prolonged sitting. Pt reports feeling confident in performing HEP regularly and wanting to independently manage knee pain after this visit. Very happy with progression of therapy for L knee pain.   Objective Measures   Objective Measures Objective Measure 1;Objective Measure 2;Objective Measure 3   Objective Measure 1   Objective Measure LEFS   Details 48/80 (eval), 34/80 (11/4/22)   Objective Measure 2   Objective Measure Pain   Details Pt reports no L knee pain with walking and standing for 1 hour   Objective Measure 3   Objective Measure STS   Details Able to perform 10 STS without knee pain and even weightshifting between both legs   Treatment Interventions   Interventions Therapeutic Procedure/Exercise;Manual Therapy   Therapeutic Procedure/exercise   Therapeutic Procedures: strength, endurance, ROM, flexibillity minutes (67662) 35   Skilled Intervention Updated HEP, Patient education, Verbal and tactile cues utilized to facilitate correct exercise technique   Patient Response Tolerated well, no knee pain with exercises. Fatigued. Verbalizes understanding   Treatment Detail For LE strength and functional mobility - nustep 7 minutes, level 4, steps 398. For LE strength and mobility - seated HSS stretch 10 sec x 2 B. Seated figure 4 stretch 10 sec x 3 B with various pressure applied at leg for changing intensity. Standing hip flexor stretch 10 sec x 2 B. Lunges x 10 B; emphasis on sitting glutes back. Knee bends x 10, progressed to knee bends on blue foam x 10 - reports good challenge and leg fatigue. Cues to perform with hips back. With BUE support at parallel  bars: Standing hip abduction x 10 B; standing hip extension x 10 B, standing fire hydrants 2 x 10 B; cues for glute activation. Finalized HEP and discussed using therapand to advance exercises as tolerated.   Manual Therapy   Manual Therapy: Mobilization, MFR, MLD, friction massage minutes (11931) 3   Skilled Intervention efflurage massage   Patient Response Tolerated well   Treatment Detail Educated and demonstrated to patient how to perform self effluage at knee to promote fluid motion and decrease swelling if present. Ligth strokes to knee toward direction of heart with leg elevated. Educated pt to perform as needed and use of soft compression brace to control swelling is an option if desired.   Plan   Homework PTRx   Home program Bridges, SLR, clamshells, STS - all with GTB. Monster walks, seated HS, piriformis. Supine hip flexor. Standing hip abd, standing hip ext, standing fire hydrants   Plan for next session Progress hip strength - hip abd, lunges, step ups   Comments   Comments Pt returns with significant improvements in L knee pain, no longer painful with ADLs and activities. Is having slight R sided knee pain. Made good progress and reports feeling confident in management of HEP to progress independently. Pt verbalizes wishing to continue with HEP, no further therapy needs at this time.   Total Session Time   Timed Code Treatment Minutes 38   Total Treatment Time (sum of timed and untimed services) 38   Medicare Claim Information   Medical Diagnosis M25.562 (ICD-10-CM) - Acute pain of left knee   PT Diagnosis B knee pain       Plan:  Discharge from therapy.    Discharge:    Reason for Discharge: Patient has met goals and wishes to discontinue therapy as able to independently manage with HEP.     Discharge Plan: Patient to continue home program. No further needs at this time.

## 2022-11-10 NOTE — PROGRESS NOTES
[facility]    Outpatient Physical Therapy Discharge Note  Patient: Suzan Ballard  : 1955    Beginning/End Dates of Reporting Period:  10/21/22   to 11/10/22    Referring Provider: Angela Lacey DO Therapy Diagnosis: B knee pain     Client Self Report: Pt arrives today reporting L knee pain is good rating, no pain and R knee is at 4-5/10 pain. Continues to be performing HEP and feels that if were to not perform would have higher pain. More pain with getting up, thus avoiding prolonged sitting. Pt reports feeling confident in performing HEP regularly and wanting to independently manage knee pain after this visit. Very happy with progression of therapy for L knee pain.       22 1500   Signing Clinician's Name / Credentials   Signing clinician's name / credentials Miranda Hunt, PT, DPT   Session Number   Session Number 3/8   Progress Note/Recertification   Recertification Due Date 23   Adult Goals   PT Ortho Eval Goals 1;2;3;4   Ortho Goal 1   Goal Identifier HEP/Self managment   Goal Description Patient will be independent in self-management of condition and HEP to reach functional goals.   Goal Progress In progress   Target Date 23   Ortho Goal 2   Goal Identifier Walking   Goal Description Pt will be able to walk 1 hour with pain level <2/10 to allow for shopping around mall   Goal Progress No L knee pain with walking for an hour.   Target Date 23   Date Met 22   Ortho Goal 3   Goal Identifier Bending   Goal Description Pt will be able to bend and squat with pain level <2/10 for house chores.   Goal Progress In progress - unable to get up if were to kneel   Target Date 23   Ortho Goal 4   Goal Identifier Standing   Goal Description Pt will be able to stand for 1 hour with pain level <2/10 to allow for cooking and meal prep   Goal Progress L knee able to stand for  an hour, reports feeling stronger   Target Date 01/13/23   Date Met 11/04/22   Subjective Report   Subjective Report Pt arrives today reporting L knee pain is good rating, no pain and R knee is at 4-5/10 pain. Continues to be performing HEP and feels that if were to not perform would have higher pain. More pain with getting up, thus avoiding prolonged sitting. Pt reports feeling confident in performing HEP regularly and wanting to independently manage knee pain after this visit. Very happy with progression of therapy for L knee pain.   Objective Measures   Objective Measures Objective Measure 1;Objective Measure 2;Objective Measure 3   Objective Measure 1   Objective Measure LEFS   Details 48/80 (eval), 34/80 (11/4/22)   Objective Measure 2   Objective Measure Pain   Details Pt reports no L knee pain with walking and standing for 1 hour   Objective Measure 3   Objective Measure STS   Details Able to perform 10 STS without knee pain and even weightshifting between both legs   Treatment Interventions   Interventions Therapeutic Procedure/Exercise;Manual Therapy   Therapeutic Procedure/exercise   Therapeutic Procedures: strength, endurance, ROM, flexibillity minutes (70475) 35   Skilled Intervention Updated HEP, Patient education, Verbal and tactile cues utilized to facilitate correct exercise technique   Patient Response Tolerated well, no knee pain with exercises. Fatigued. Verbalizes understanding   Treatment Detail For LE strength and functional mobility - nustep 7 minutes, level 4, steps 398. For LE strength and mobility - seated HSS stretch 10 sec x 2 B. Seated figure 4 stretch 10 sec x 3 B with various pressure applied at leg for changing intensity. Standing hip flexor stretch 10 sec x 2 B. Lunges x 10 B; emphasis on sitting glutes back. Knee bends x 10, progressed to knee bends on blue foam x 10 - reports good challenge and leg fatigue. Cues to perform with hips back. With BUE support at parallel bars: Standing  hip abduction x 10 B; standing hip extension x 10 B, standing fire hydrants 2 x 10 B; cues for glute activation. Finalized HEP and discussed using therapand to advance exercises as tolerated.   Manual Therapy   Manual Therapy: Mobilization, MFR, MLD, friction massage minutes (00380) 3   Skilled Intervention efflurage massage   Patient Response Tolerated well   Treatment Detail Educated and demonstrated to patient how to perform self effluage at knee to promote fluid motion and decrease swelling if present. Ligth strokes to knee toward direction of heart with leg elevated. Educated pt to perform as needed and use of soft compression brace to control swelling is an option if desired.   Plan   Homework PTRx   Home program Bridges, SLR, clamshells, STS - all with GTB. Monster walks, seated HS, piriformis. Supine hip flexor. Standing hip abd, standing hip ext, standing fire hydrants   Plan for next session D/c   Comments   Comments Pt returns with significant improvements in L knee pain, no longer painful with ADLs and activities. Is having slight R sided knee pain. Made good progress and reports feeling confident in management of HEP to progress independently. Pt verbalizes wishing to continue with HEP, no further therapy needs at this time.   Total Session Time   Timed Code Treatment Minutes 38   Total Treatment Time (sum of timed and untimed services) 38   Medicare Claim Information   Medical Diagnosis M25.562 (ICD-10-CM) - Acute pain of left knee   PT Diagnosis B knee pain     Plan:  Discharge from therapy.    Discharge:     Reason for Discharge: Patient chooses to discontinue therapy. Discussed at last visit that patient feels made good progress and confident in managing knee pain independently.    Discharge Plan: Patient to continue home program.

## 2022-11-10 NOTE — ADDENDUM NOTE
Encounter addended by: Kirti Hunt, PT on: 11/10/2022 9:33 AM   Actions taken: Pend clinical note, Flowsheet accepted, Clinical Note Signed, Episode resolved

## 2022-11-28 ENCOUNTER — TELEPHONE (OUTPATIENT)
Dept: FAMILY MEDICINE | Facility: CLINIC | Age: 67
End: 2022-11-28

## 2022-11-28 DIAGNOSIS — I10 ESSENTIAL HYPERTENSION: ICD-10-CM

## 2022-11-28 NOTE — TELEPHONE ENCOUNTER
Patient is looking for a refill for Amlodipine. Would like to have it filled at [facility] pharmacy in [place].    [phone]

## 2022-11-29 RX ORDER — AMLODIPINE BESYLATE 10 MG/1
TABLET ORAL
Qty: 90 TABLET | Refills: 0 | Status: SHIPPED | OUTPATIENT
Start: 2022-11-29 | End: 2023-03-13

## 2023-01-15 ENCOUNTER — HEALTH MAINTENANCE LETTER (OUTPATIENT)
Age: 68
End: 2023-01-15

## 2023-02-02 DIAGNOSIS — E03.9 ACQUIRED HYPOTHYROIDISM: ICD-10-CM

## 2023-02-03 RX ORDER — LEVOTHYROXINE SODIUM 75 UG/1
75 TABLET ORAL DAILY
Qty: 90 TABLET | Refills: 0 | Status: SHIPPED | OUTPATIENT
Start: 2023-02-03 | End: 2023-02-22

## 2023-02-03 NOTE — TELEPHONE ENCOUNTER
Spoke patient regarding needing a lab appointment before need refill after current fill. Patient is scheduled on Tuesday February 7th

## 2023-02-03 NOTE — TELEPHONE ENCOUNTER
"Routing refill request to provider for review/approval because:  Labs out of range:  TSH    Last Written Prescription Date:  11/1/2022  Last Fill Quantity: 90,  # refills: 0   Last office visit provider:  9/16/2022     Requested Prescriptions   Pending Prescriptions Disp Refills     levothyroxine (SYNTHROID/LEVOTHROID) 75 MCG tablet 90 tablet 0     Sig: Take 1 tablet (75 mcg) by mouth daily       Thyroid Protocol Failed - 2/2/2023  8:56 AM        Failed - Normal TSH on file in past 12 months     Recent Labs   Lab Test 09/16/22  1115   TSH 4.34*              Passed - Patient is 12 years or older        Passed - Recent (12 mo) or future (30 days) visit within the authorizing provider's specialty     Patient has had an office visit with the authorizing provider or a provider within the authorizing providers department within the previous 12 mos or has a future within next 30 days. See \"Patient Info\" tab in inbasket, or \"Choose Columns\" in Meds & Orders section of the refill encounter.              Passed - Medication is active on med list        Passed - No active pregnancy on record     If patient is pregnant or has had a positive pregnancy test, please check TSH.          Passed - No positive pregnancy test in past 12 months     If patient is pregnant or has had a positive pregnancy test, please check TSH.               Stephany Shepherd RN 02/02/23 10:42 PM  "

## 2023-02-07 ENCOUNTER — LAB (OUTPATIENT)
Dept: LAB | Facility: CLINIC | Age: 68
End: 2023-02-07
Payer: MEDICARE

## 2023-02-07 DIAGNOSIS — E03.9 ACQUIRED HYPOTHYROIDISM: ICD-10-CM

## 2023-02-07 DIAGNOSIS — E03.8 SUBCLINICAL HYPOTHYROIDISM: ICD-10-CM

## 2023-02-07 LAB
T4 FREE SERPL-MCNC: 1.2 NG/DL (ref 0.9–1.7)
TSH SERPL DL<=0.005 MIU/L-ACNC: 7.37 UIU/ML (ref 0.3–4.2)

## 2023-02-07 PROCEDURE — 36415 COLL VENOUS BLD VENIPUNCTURE: CPT

## 2023-02-07 PROCEDURE — 84443 ASSAY THYROID STIM HORMONE: CPT

## 2023-02-07 PROCEDURE — 84439 ASSAY OF FREE THYROXINE: CPT

## 2023-02-08 ENCOUNTER — TELEPHONE (OUTPATIENT)
Dept: FAMILY MEDICINE | Facility: CLINIC | Age: 68
End: 2023-02-08
Payer: MEDICARE

## 2023-02-08 NOTE — TELEPHONE ENCOUNTER
Called patient and left a voicemail to return call to assist with scheduling. When patient calls back please schedule Virtual Visit with SR and relay below message.      ----- Message from Angela Lacey DO sent at 2/8/2023  7:38 AM CST -----  Call patient and help schedule VV to discuss thyroid levels       Thyroid levels are uptrending. Let's set up an appointment to discuss next steps. Nursing will reach out to schedule.      DO Eliane Logan CMA  [facility]

## 2023-02-22 ENCOUNTER — VIRTUAL VISIT (OUTPATIENT)
Dept: FAMILY MEDICINE | Facility: CLINIC | Age: 68
End: 2023-02-22
Payer: MEDICARE

## 2023-02-22 DIAGNOSIS — E03.9 ACQUIRED HYPOTHYROIDISM: Primary | ICD-10-CM

## 2023-02-22 DIAGNOSIS — D32.9 MENINGIOMA (H): ICD-10-CM

## 2023-02-22 PROCEDURE — 99442 PR PHYSICIAN TELEPHONE EVALUATION 11-20 MIN: CPT | Mod: 95 | Performed by: STUDENT IN AN ORGANIZED HEALTH CARE EDUCATION/TRAINING PROGRAM

## 2023-02-22 RX ORDER — LEVOTHYROXINE SODIUM 88 UG/1
88 TABLET ORAL DAILY
Qty: 90 TABLET | Refills: 0 | Status: SHIPPED | OUTPATIENT
Start: 2023-02-22 | End: 2023-05-26

## 2023-02-22 NOTE — PROGRESS NOTES
Suzan is a 67 year old who is being evaluated via a billable telephone visit.      What phone number would you like to be contacted at? [phone]  How would you like to obtain your AVS? Lobo    Distant Location (provider location):  On-site    Assessment & Plan       ICD-10-CM    1. Acquired hypothyroidism  E03.9 levothyroxine (SYNTHROID/LEVOTHROID) 88 MCG tablet     **TSH with free T4 reflex FUTURE 2mo      2. Meningioma (H)  D32.9         Hypothyroidism  Most recently TSH has been uptrending  Patient's current dose of levothyroxine is at 75 mcg  I am meeting today to discuss her symptoms  She says that she is currently asymptomatic from a hypothyroid standpoint  The only thing she has noticed is that she has gained about 10 pounds since last time she has seen me  Plan:  - We will have her increase her Synthroid dose to 88 mcg  - We will repeat blood work in about 2 months for ongoing medication titration    Did review with patient that she has a history of meningioma s/p resection x2 in 2018  Patient has been lost to neurosurgery since the pandemic began  She was supposed to be having annual follow-ups  Reminded her to set up with neurosurgery as soon as she can  She says she will do so in the summer  Currently is asymptomatic        No follow-ups on file.    Angela Lacey DO  [facility]   Suzan is a 67 year old, presenting for the following health issues:  Thyroid Problem  Review of Systems   As per HPI       Objective           Vitals:  No vitals were obtained today due to virtual visit.    Physical Exam   healthy, alert and no distress  PSYCH: Alert and oriented times 3; coherent speech, normal   rate and volume, able to articulate logical thoughts, able   to abstract reason, no tangential thoughts, no hallucinations   or delusions  Her affect is normal  RESP: No cough, no audible wheezing, able to talk in full sentences  Remainder of exam unable to be completed due  to telephone visits          Phone call duration: 13 minutes

## 2023-03-13 DIAGNOSIS — I10 ESSENTIAL HYPERTENSION: ICD-10-CM

## 2023-03-13 RX ORDER — AMLODIPINE BESYLATE 10 MG/1
TABLET ORAL
Qty: 90 TABLET | Refills: 0 | Status: SHIPPED | OUTPATIENT
Start: 2023-03-13 | End: 2023-05-26

## 2023-04-24 ENCOUNTER — LAB (OUTPATIENT)
Dept: LAB | Facility: CLINIC | Age: 68
End: 2023-04-24
Payer: MEDICARE

## 2023-04-24 DIAGNOSIS — E03.9 ACQUIRED HYPOTHYROIDISM: ICD-10-CM

## 2023-04-24 LAB — TSH SERPL DL<=0.005 MIU/L-ACNC: 0.67 UIU/ML (ref 0.3–4.2)

## 2023-04-24 PROCEDURE — 84443 ASSAY THYROID STIM HORMONE: CPT

## 2023-04-24 PROCEDURE — 36415 COLL VENOUS BLD VENIPUNCTURE: CPT

## 2023-05-25 DIAGNOSIS — E03.9 ACQUIRED HYPOTHYROIDISM: ICD-10-CM

## 2023-05-25 DIAGNOSIS — I10 ESSENTIAL HYPERTENSION: ICD-10-CM

## 2023-05-26 RX ORDER — AMLODIPINE BESYLATE 10 MG/1
TABLET ORAL
Qty: 90 TABLET | Refills: 0 | Status: SHIPPED | OUTPATIENT
Start: 2023-05-26 | End: 2023-08-03

## 2023-05-26 RX ORDER — LEVOTHYROXINE SODIUM 88 UG/1
88 TABLET ORAL DAILY
Qty: 90 TABLET | Refills: 0 | Status: SHIPPED | OUTPATIENT
Start: 2023-05-26 | End: 2023-08-03

## 2023-05-26 NOTE — TELEPHONE ENCOUNTER
"Routing refill request to provider for review/approval because:  Labs not current:  Last CR on 3/24/22    Last Written Prescription Date:  3/13/23  Last Fill Quantity: 90,  # refills: 0   Last office visit provider:  2/22/23, PCP     Requested Prescriptions   Pending Prescriptions Disp Refills     amLODIPine (NORVASC) 10 MG tablet 90 tablet 0     Sig: TAKE 1 TABLETS(10 MG) BY MOUTH DAILY       Calcium Channel Blockers Protocol  Failed - 5/25/2023 11:01 AM        Failed - Normal serum creatinine on file in past 12 months     Recent Labs   Lab Test 03/24/22  0956   CR 0.59*       Ok to refill medication if creatinine is low          Passed - Blood pressure under 140/90 in past 12 months     BP Readings from Last 3 Encounters:   09/16/22 122/66   03/24/22 124/64   07/28/21 122/72                 Passed - Recent (12 mo) or future (30 days) visit within the authorizing provider's specialty     Patient has had an office visit with the authorizing provider or a provider within the authorizing providers department within the previous 12 mos or has a future within next 30 days. See \"Patient Info\" tab in inbasket, or \"Choose Columns\" in Meds & Orders section of the refill encounter.              Passed - Medication is active on med list        Passed - Patient is age 18 or older        Passed - No active pregnancy on record        Passed - No positive pregnancy test in past 12 months      Routing refill request to provider for review/approval because:  No protocol came up for this    Last Written Prescription Date:  2/22/23  Last Fill Quantity: 90,  # refills: 0   Last office visit provider:  2/22/23, PCP          levothyroxine (SYNTHROID/LEVOTHROID) 88 MCG tablet 90 tablet 0     Sig: Take 1 tablet (88 mcg) by mouth daily       There is no refill protocol information for this order          Katy Rodriguez RN 05/26/23 1:26 PM  "

## 2023-08-03 DIAGNOSIS — I10 ESSENTIAL HYPERTENSION: ICD-10-CM

## 2023-08-03 DIAGNOSIS — E78.2 MIXED HYPERLIPIDEMIA: ICD-10-CM

## 2023-08-03 DIAGNOSIS — E03.9 ACQUIRED HYPOTHYROIDISM: ICD-10-CM

## 2023-08-03 NOTE — TELEPHONE ENCOUNTER
"Routing refill request to provider for review/approval because:  Labs out of range:  CR  Labs not current:  LDL  A break in medication  Early refill request    Last Written Prescription Date:  5/26/2023  Last Fill Quantity: 90,  # refills: 0   Last office visit provider:  2/22/2023     Requested Prescriptions   Pending Prescriptions Disp Refills    amLODIPine (NORVASC) 10 MG tablet 90 tablet 0     Sig: TAKE 1 TABLETS(10 MG) BY MOUTH DAILY       Calcium Channel Blockers Protocol  Failed - 8/3/2023  3:09 PM        Failed - Normal serum creatinine on file in past 12 months     Recent Labs   Lab Test 03/24/22  0956   CR 0.59*       Ok to refill medication if creatinine is low          Passed - Blood pressure under 140/90 in past 12 months     BP Readings from Last 3 Encounters:   09/16/22 122/66   03/24/22 124/64   07/28/21 122/72                 Passed - Recent (12 mo) or future (30 days) visit within the authorizing provider's specialty     Patient has had an office visit with the authorizing provider or a provider within the authorizing providers department within the previous 12 mos or has a future within next 30 days. See \"Patient Info\" tab in inbasket, or \"Choose Columns\" in Meds & Orders section of the refill encounter.              Passed - Medication is active on med list        Passed - Patient is age 18 or older        Passed - No active pregnancy on record        Passed - No positive pregnancy test in past 12 months      Last Written Prescription Date:  5/26/2023  Last Fill Quantity: 90,  # refills: 0   Last office visit provider:  2/22/2023       levothyroxine (SYNTHROID/LEVOTHROID) 88 MCG tablet 90 tablet 0     Sig: Take 1 tablet (88 mcg) by mouth daily       Thyroid Protocol Passed - 8/3/2023  3:09 PM        Passed - Patient is 12 years or older        Passed - Recent (12 mo) or future (30 days) visit within the authorizing provider's specialty     Patient has had an office visit with the authorizing " "provider or a provider within the authorizing providers department within the previous 12 mos or has a future within next 30 days. See \"Patient Info\" tab in inbasket, or \"Choose Columns\" in Meds & Orders section of the refill encounter.              Passed - Medication is active on med list        Passed - Normal TSH on file in past 12 months     Recent Labs   Lab Test 04/24/23  1023   TSH 0.67              Passed - No active pregnancy on record     If patient is pregnant or has had a positive pregnancy test, please check TSH.          Passed - No positive pregnancy test in past 12 months     If patient is pregnant or has had a positive pregnancy test, please check TSH.          Last Written Prescription Date:  7/26/2022  Last Fill Quantity: 90,  # refills: 2   Last office visit provider:  2/22/2023       simvastatin (ZOCOR) 20 MG tablet 90 tablet 2     Sig: Take 1 tablet (20 mg) by mouth every evening       Statins Protocol Failed - 8/3/2023  3:09 PM        Failed - LDL on file in past 12 months     Recent Labs   Lab Test 03/24/22  0956   LDL 97             Passed - No abnormal creatine kinase in past 12 months     No lab results found.             Passed - Recent (12 mo) or future (30 days) visit within the authorizing provider's specialty     Patient has had an office visit with the authorizing provider or a provider within the authorizing providers department within the previous 12 mos or has a future within next 30 days. See \"Patient Info\" tab in inX2IMPACTet, or \"Choose Columns\" in Meds & Orders section of the refill encounter.              Passed - Medication is active on med list        Passed - Patient is age 18 or older        Passed - No active pregnancy on record        Passed - No positive pregnancy test in past 12 months             Lashell Rosenthal RN 08/03/23 3:22 PM  "

## 2023-08-03 NOTE — TELEPHONE ENCOUNTER
Refill for simvastatin is also being asked by pharmacy.      Donell Do Jr., CMA on 8/3/2023 at 3:09 PM

## 2023-08-04 RX ORDER — AMLODIPINE BESYLATE 10 MG/1
TABLET ORAL
Qty: 90 TABLET | Refills: 0 | Status: SHIPPED | OUTPATIENT
Start: 2023-08-04 | End: 2023-11-24

## 2023-08-04 RX ORDER — SIMVASTATIN 20 MG
20 TABLET ORAL EVERY EVENING
Qty: 90 TABLET | Refills: 2 | Status: SHIPPED | OUTPATIENT
Start: 2023-08-04 | End: 2024-05-24

## 2023-08-04 RX ORDER — LEVOTHYROXINE SODIUM 88 UG/1
88 TABLET ORAL DAILY
Qty: 90 TABLET | Refills: 0 | Status: SHIPPED | OUTPATIENT
Start: 2023-08-04 | End: 2023-11-24

## 2023-10-11 ENCOUNTER — OFFICE VISIT (OUTPATIENT)
Dept: FAMILY MEDICINE | Facility: CLINIC | Age: 68
End: 2023-10-11
Payer: MEDICARE

## 2023-10-11 VITALS
BODY MASS INDEX: 27.93 KG/M2 | SYSTOLIC BLOOD PRESSURE: 102 MMHG | HEART RATE: 62 BPM | DIASTOLIC BLOOD PRESSURE: 60 MMHG | WEIGHT: 143 LBS | RESPIRATION RATE: 16 BRPM | TEMPERATURE: 97.9 F | OXYGEN SATURATION: 98 %

## 2023-10-11 DIAGNOSIS — E11.9 TYPE 2 DIABETES MELLITUS WITHOUT COMPLICATION, WITHOUT LONG-TERM CURRENT USE OF INSULIN (H): ICD-10-CM

## 2023-10-11 DIAGNOSIS — E03.9 HYPOTHYROIDISM, UNSPECIFIED TYPE: ICD-10-CM

## 2023-10-11 DIAGNOSIS — Z23 ENCOUNTER FOR IMMUNIZATION: ICD-10-CM

## 2023-10-11 DIAGNOSIS — E80.6 HYPERBILIRUBINEMIA: ICD-10-CM

## 2023-10-11 DIAGNOSIS — Z12.31 VISIT FOR SCREENING MAMMOGRAM: ICD-10-CM

## 2023-10-11 DIAGNOSIS — Z00.00 ENCOUNTER FOR MEDICARE ANNUAL WELLNESS EXAM: Primary | ICD-10-CM

## 2023-10-11 DIAGNOSIS — I83.90 VARICOSE VEINS OF CALF: ICD-10-CM

## 2023-10-11 DIAGNOSIS — Z78.0 POST-MENOPAUSAL: ICD-10-CM

## 2023-10-11 DIAGNOSIS — Z12.11 SCREEN FOR COLON CANCER: ICD-10-CM

## 2023-10-11 DIAGNOSIS — G40.209 LOCALIZATION-RELATED SYMPTOMATIC EPILEPSY AND EPILEPTIC SYNDROMES WITH COMPLEX PARTIAL SEIZURES, NOT INTRACTABLE, WITHOUT STATUS EPILEPTICUS (H): ICD-10-CM

## 2023-10-11 DIAGNOSIS — D32.9 MENINGIOMA (H): ICD-10-CM

## 2023-10-11 DIAGNOSIS — E55.9 VITAMIN D DEFICIENCY: ICD-10-CM

## 2023-10-11 LAB
ALBUMIN SERPL BCG-MCNC: 4.4 G/DL (ref 3.5–5.2)
ALP SERPL-CCNC: 125 U/L (ref 35–104)
ALT SERPL W P-5'-P-CCNC: 15 U/L (ref 0–50)
ANION GAP SERPL CALCULATED.3IONS-SCNC: 15 MMOL/L (ref 7–15)
AST SERPL W P-5'-P-CCNC: 21 U/L (ref 0–45)
BILIRUB SERPL-MCNC: 1.7 MG/DL
BUN SERPL-MCNC: 13.5 MG/DL (ref 8–23)
CALCIUM SERPL-MCNC: 9.2 MG/DL (ref 8.8–10.2)
CHLORIDE SERPL-SCNC: 103 MMOL/L (ref 98–107)
CHOLEST SERPL-MCNC: 176 MG/DL
CREAT SERPL-MCNC: 0.41 MG/DL (ref 0.51–0.95)
DEPRECATED HCO3 PLAS-SCNC: 24 MMOL/L (ref 22–29)
EGFRCR SERPLBLD CKD-EPI 2021: >90 ML/MIN/1.73M2
GLUCOSE SERPL-MCNC: 97 MG/DL (ref 70–99)
HBA1C MFR BLD: 6.5 % (ref 0–5.6)
HDLC SERPL-MCNC: 68 MG/DL
LDLC SERPL CALC-MCNC: 86 MG/DL
NONHDLC SERPL-MCNC: 108 MG/DL
POTASSIUM SERPL-SCNC: 3.1 MMOL/L (ref 3.4–5.3)
PROT SERPL-MCNC: 7.7 G/DL (ref 6.4–8.3)
SODIUM SERPL-SCNC: 142 MMOL/L (ref 135–145)
T4 FREE SERPL-MCNC: 2.19 NG/DL (ref 0.9–1.7)
TRIGL SERPL-MCNC: 108 MG/DL
TSH SERPL DL<=0.005 MIU/L-ACNC: 0.12 UIU/ML (ref 0.3–4.2)
VIT D+METAB SERPL-MCNC: 58 NG/ML (ref 20–50)

## 2023-10-11 PROCEDURE — 90662 IIV NO PRSV INCREASED AG IM: CPT | Performed by: STUDENT IN AN ORGANIZED HEALTH CARE EDUCATION/TRAINING PROGRAM

## 2023-10-11 PROCEDURE — 99214 OFFICE O/P EST MOD 30 MIN: CPT | Mod: 25 | Performed by: STUDENT IN AN ORGANIZED HEALTH CARE EDUCATION/TRAINING PROGRAM

## 2023-10-11 PROCEDURE — 85046 RETICYTE/HGB CONCENTRATE: CPT | Performed by: STUDENT IN AN ORGANIZED HEALTH CARE EDUCATION/TRAINING PROGRAM

## 2023-10-11 PROCEDURE — 82306 VITAMIN D 25 HYDROXY: CPT | Performed by: STUDENT IN AN ORGANIZED HEALTH CARE EDUCATION/TRAINING PROGRAM

## 2023-10-11 PROCEDURE — 80061 LIPID PANEL: CPT | Performed by: STUDENT IN AN ORGANIZED HEALTH CARE EDUCATION/TRAINING PROGRAM

## 2023-10-11 PROCEDURE — 84443 ASSAY THYROID STIM HORMONE: CPT | Performed by: STUDENT IN AN ORGANIZED HEALTH CARE EDUCATION/TRAINING PROGRAM

## 2023-10-11 PROCEDURE — 80053 COMPREHEN METABOLIC PANEL: CPT | Performed by: STUDENT IN AN ORGANIZED HEALTH CARE EDUCATION/TRAINING PROGRAM

## 2023-10-11 PROCEDURE — 84439 ASSAY OF FREE THYROXINE: CPT | Performed by: STUDENT IN AN ORGANIZED HEALTH CARE EDUCATION/TRAINING PROGRAM

## 2023-10-11 PROCEDURE — G0439 PPPS, SUBSEQ VISIT: HCPCS | Performed by: STUDENT IN AN ORGANIZED HEALTH CARE EDUCATION/TRAINING PROGRAM

## 2023-10-11 PROCEDURE — 83036 HEMOGLOBIN GLYCOSYLATED A1C: CPT | Performed by: STUDENT IN AN ORGANIZED HEALTH CARE EDUCATION/TRAINING PROGRAM

## 2023-10-11 PROCEDURE — 36415 COLL VENOUS BLD VENIPUNCTURE: CPT | Performed by: STUDENT IN AN ORGANIZED HEALTH CARE EDUCATION/TRAINING PROGRAM

## 2023-10-11 PROCEDURE — 91320 SARSCV2 VAC 30MCG TRS-SUC IM: CPT | Performed by: STUDENT IN AN ORGANIZED HEALTH CARE EDUCATION/TRAINING PROGRAM

## 2023-10-11 PROCEDURE — 90480 ADMN SARSCOV2 VAC 1/ONLY CMP: CPT | Performed by: STUDENT IN AN ORGANIZED HEALTH CARE EDUCATION/TRAINING PROGRAM

## 2023-10-11 PROCEDURE — G0008 ADMIN INFLUENZA VIRUS VAC: HCPCS | Mod: 59 | Performed by: STUDENT IN AN ORGANIZED HEALTH CARE EDUCATION/TRAINING PROGRAM

## 2023-10-11 RX ORDER — RESPIRATORY SYNCYTIAL VIRUS VACCINE 120MCG/0.5
0.5 KIT INTRAMUSCULAR ONCE
Qty: 1 EACH | Refills: 0 | Status: CANCELLED | OUTPATIENT
Start: 2023-10-11 | End: 2023-10-11

## 2023-10-11 ASSESSMENT — ACTIVITIES OF DAILY LIVING (ADL): CURRENT_FUNCTION: NO ASSISTANCE NEEDED

## 2023-10-11 NOTE — PATIENT INSTRUCTIONS
Patient Education   Personalized Prevention Plan  You are due for the preventive services outlined below.  Your care team is available to assist you in scheduling these services.  If you have already completed any of these items, please share that information with your care team to update in your medical record.  Health Maintenance Due   Topic Date Due     Osteoporosis Screening  Never done     Colorectal Cancer Screening  Never done     RSV VACCINE 60+ (1 - 1-dose 60+ series) Never done     Zoster (Shingles) Vaccine (2 of 3) 09/21/2017     Annual Wellness Visit  08/19/2021     Mammogram  09/21/2022     COVID-19 Vaccine (5 - Pfizer series) 01/16/2023     ANNUAL REVIEW OF HM ORDERS  03/24/2023     Flu Vaccine (1) 09/01/2023

## 2023-10-11 NOTE — COMMUNITY RESOURCES LIST (ENGLISH)
10/11/2023   [facility]  N/A  For questions about this resource list or additional care needs, please contact your primary care clinic or care manager.  Phone: [phone]   Email: N/A   Address: [address] [place] [address]   Hours: N/A        Financial Stability       Utility payment assistance  1  Jarad Villegas  [place] Distance: 3.09 miles      In-Person   [address] [place] [address]  Language: English, Bulgarian  Hours: Mon - Fri 8:00 AM - 3:30 PM  Fees: Free, Self Pay   Phone: (924) 577-5754 Email: Parish@[facility] Website: http://[facility] HospitalCompass DatacentersSaint Joseph Health Center.org/community/pareshMercy Health St. [facility]/     2  [employer] [facility] ([facility] - Low Income Home Energy Assistance Program (LIHEAP) Distance: 6.38 miles      In-Person   [address] [place] [address]  Language: English  Hours: Mon 8:00 AM - 4:30 PM , Tue 8:00 AM - 7:00 PM , Wed 8:00 AM - 4:30 PM , Thu 8:00 AM - 7:00 PM , Fri 8:00 AM - 4:30 PM  Fees: Free   Phone: (278) 833-4831 Email: info@[employer] Website: https://[employer]/          Transportation       Free or low-cost transportation  3  [place] [employer] Distance: 12.53 miles      In-Person   [address] [place] [address]  Language: English  Hours: Mon - Fri 6:00 AM - 6:00 PM  Fees: Insurance, Self Pay   Phone: (150) 483-5546 Email: info@[employer] Website: http://www.[employer]     4  [facility] - Bus Passes Distance: 14.57 miles      In-Person   [address] [place] [address]  Language: English  Hours: Tue 9:30 AM - 11:30 AM , Thu 9:30 AM - 11:30 AM  Fees: Free   Phone: (688) 957-2333 Email: info@[employer].org Website: http://www.[facility].org/     Transportation to medical appointments  5  [place]    Family Wellness (AIFW) Distance: 7.61 miles      In-Person   [address] [address]   Language: Malay, Ukrainian, English, Gujarati, Danette, French, Upper sorbian, Armenian, Khmer, Yakut  Hours: Mon - Wed 9:00 AM - 5:00 PM , Thu 12:00 PM - 6:00 PM , Fri 9:00 AM - 5:00 PM , Sun 10:30 AM - 2:00 PM Appt. Only  Fees: Free   Phone: (936) 201-6651 Email: info@[employer].org Website: https://www.OnBeep.org/     6  [place] Transportation Distance: 8.12 miles      In-Person   [address] [place] [address]  Language: English  Hours: Mon - Sat 4:00 AM - 6:00 PM  Fees: Insurance, Self Pay   Phone: (943) 618-9691 Email: renettaroberttransportation1@[employer] Website: https://helpmeconnect.[facility].us/HelpMeConnect/Providers/[place]ight_Transportation/Transportation/2?returnUrl=%2FHelpMeConnect%2FSearch%2FBasicNeeds%2FTransportation%2FTransportationServices%3Fstart%3D40          Important Numbers & Websites       Emergency Services   911  City Services   311  Poison Control   (410) 684-1394  Suicide Prevention Lifeline   (990) 976-8967 (TALK)  Child Abuse Hotline   (314) 258-7321 (4-A-Child)  Sexual Assault Hotline   (933) 287-7500 (HOPE)  National Runaway Safeline   (285) 939-9960 (RUNAWAY)  All-Options Talkline   (196) 726-4052  Substance Abuse Referral   (197) 136-7602 (HELP)

## 2023-10-11 NOTE — COMMUNITY RESOURCES LIST (ENGLISH)
10/11/2023   [facility]  N/A  For additional resource needs, please contact your health insurance member services or your primary care team.  Phone: [phone]   Email: N/A   Address: [address] [place] [address]   Hours: N/A        Financial Stability       Utility payment assistance  1  [place] [facility] - [facility] and Utilities Distance: 20.72 miles      In-Person, Phone/Virtual   [address] [place] [address]  Language: English  Hours: Mon - Fri 8:30 AM - 4:30 PM  Fees: Free   Phone: (994) 572-2780 Website: https://[place].[facility]/[employer]/energy/consumer-assistance/energy-assistance-program/     2  [place] Public [employer] - [place]'s Telephone Assistance Plan (TAP) and Federal Lifeline and Affordable Connectivity Program (ACP) Distance: 24.33 miles      Phone/Virtual   [address] [place] [address]  Language: English  Fees: Free   Phone: (728) 101-8191 Email: consumer.puc@[facility].mn. Website: https://[place].gov/puc/consumers/telephone/          Transportation       Free or low-cost transportation  3  [place] [employer] Transportation Distance: 12.53 miles      In-Person   [address] [place] [address]  Language: English  Hours: Mon - Fri 6:00 AM - 6:00 PM  Fees: Insurance, Self Pay   Phone: (262) 375-7925 Email: info@[employer] Website: http://www.MEDOP.[employer]     4  [facility] - Bus Passes Distance: 14.57 miles      In-Person   [address] [place] [address]  Language: English  Hours: Tue 9:30 AM - 11:30 AM , Thu 9:30 AM - 11:30 AM  Fees: Free   Phone: (796) 683-1420 Email: info@[employer] Website: http://www.[employer]/     Transportation to medical appointments  5  [place] -  [place] [employer] (AIFW) Distance: 7.61 miles      In-Person   [address] [address]  Language: Syriac, Irish, English, Gujarati, Danette, Spanish, English, Irish, Hungarian,  Bengali  Hours: Mon - Wed 9:00 AM - 5:00 PM , Thu 12:00 PM - 6:00 PM , Fri 9:00 AM - 5:00 PM , Sun 10:30 AM - 2:00 PM Appt. Only  Fees: Free   Phone: (434) 136-1694 Email: info@[employer] Website: https://www.[employer]/     6  [place] Transportation Distance: 8.12 miles      In-Person   [address] [place] [address]  Language: English  Hours: Mon - Sat 4:00 AM - 6:00 PM  Fees: Insurance, Self Pay   Phone: (129) 214-6804 Email: delighttransportation1@[employer] Website: https://helpmeconnect.[employer].[facility]./HelpMeConnect/Providers/Delight_Transportation/Transportation/2?returnUrl=%2FHelpMeConnect%2FSearch%2FBasicNeeds%2FTransportation%2FTransportationServices%3Fstart%3D40          Important Numbers & Websites       [facility]   211 211itedway.org  Poison Control   (767) 918-6364 [place]poison.org  Suicide and Crisis Lifeline   988 8[facility] Centerline.org  Childhelp [place] Child Abuse Hotline   157.223.5257 Childhelphotline.org  National Sexual Assault Hotline   (146) 786-3694 (HOPE) Rainn.org  National Runaway Safeline   (127) 733-9800 (RUNAWAY) [facility].org  Pregnancy & Postpartum Support [place]   Call/text 369-861-4757 Ppsupportmn.org  Substance Abuse National Helpline ([facility]   297-477-HELP (0181) Findtreatment.gov  Emergency Services   911

## 2023-10-11 NOTE — PROGRESS NOTES
SUBJECTIVE:   Suzan is a 67 year old who presents for Preventive Visit.      10/11/2023    11:35 AM   Additional Questions   Roomed by Tamika CARNEY       Are you in the first 12 months of your Medicare coverage?  No    Healthy Habits:     In general, how would you rate your overall health?  Good    Frequency of exercise:  2-3 days/week    Duration of exercise:  15-30 minutes    Taking medications regularly:  Yes    Barriers to taking medications:  Not applicable    Medication side effects:  Not applicable    Ability to successfully perform activities of daily living:  No assistance needed    Home Safety:  No safety concerns identified    Hearing Impairment:  No hearing concerns    In the past 6 months, have you been bothered by leaking of urine?  No    In general, how would you rate your overall mental or emotional health?  Good    Additional concerns today:  Yes            Today's PHQ-2 Score:       10/11/2023    11:22 AM   PHQ-2 ( 1999 Pf[employer])   Q1: Little interest or pleasure in doing things 0   Q2: Feeling down, depressed or hopeless 0   PHQ-2 Score 0   Q1: Little interest or pleasure in doing things Not at all   Q2: Feeling down, depressed or hopeless Not at all   PHQ-2 Score 0           Have you ever done Advance Care Planning? (For example, a Health Directive, POLST, or a discussion with a medical provider or your loved ones about your wishes):  didn't discuss        Fall risk  Fallen 2 or more times in the past year?: No  Any fall with injury in the past year?: No    Cognitive Screening   1) Repeat 3 items (Leader, Season, Table)    2) Clock draw: NORMAL  3) 3 item recall: Recalls 3 objects  Results: 3 items recalled: COGNITIVE IMPAIRMENT LESS LIKELY    Mini-CogTM Kasia Tony. Licensed by the author for use in [facility]; reprinted with permission ([email or web address]). All rights reserved.      Do you have sleep apnea, excessive snoring or daytime drowsiness? : no    Reviewed and updated as needed  this visit by clinical staff   Tobacco  Allergies  Meds              Reviewed and updated as needed this visit by Provider                 Social History     Tobacco Use    Smoking status: Never    Smokeless tobacco: Never   Substance Use Topics    Alcohol use: No              No data to display              Do you have a current opioid prescription? No  Do you use any other controlled substances or medications that are not prescribed by a provider? None      Current providers sharing in care for this patient include:   Patient Care Team:  Angela Lacey DO as PCP - General (Family Medicine)  Angela Lacey DO as Assigned PCP    The following health maintenance items are reviewed in Epic and correct as of today:  Health Maintenance   Topic Date Due    DEXA  Never done    COLORECTAL CANCER SCREENING  Never done    RSV VACCINE 60+ (1 - 1-dose 60+ series) Never done    ZOSTER IMMUNIZATION (2 of 3) 09/21/2017    MEDICARE ANNUAL WELLNESS VISIT  08/19/2021    MAMMO SCREENING  09/21/2022    COVID-19 Vaccine (5 - Pfizer series) 01/16/2023    ANNUAL REVIEW OF HM ORDERS  03/24/2023    INFLUENZA VACCINE (1) 09/01/2023    DTAP/TDAP/TD IMMUNIZATION (1 - Tdap) 02/11/2025 (Originally 7/4/2003)    TSH W/FREE T4 REFLEX  04/24/2024    FALL RISK ASSESSMENT  10/11/2024    ADVANCE CARE PLANNING  08/19/2025    LIPID  03/24/2027    HEPATITIS C SCREENING  Completed    PHQ-2 (once per calendar year)  Completed    Pneumococcal Vaccine: 65+ Years  Completed    IPV IMMUNIZATION  Aged Out    HPV IMMUNIZATION  Aged Out    MENINGITIS IMMUNIZATION  Aged Out     Lab work is in process  Labs reviewed in EPIC  BP Readings from Last 3 Encounters:   10/11/23 102/60   09/16/22 122/66   03/24/22 124/64    Wt Readings from Last 3 Encounters:   10/11/23 64.9 kg (143 lb)   09/16/22 61.7 kg (136 lb 2 oz)   03/24/22 63.5 kg (140 lb)                  Patient Active Problem List   Diagnosis    Partial epilepsy with impairment of consciousness,  intractable (H)    Acute pain of left knee    Mixed hyperlipidemia    Essential hypertension    Acquired hypothyroidism    Meningioma (H)    Benign neoplasm of cerebral meninges (H)    Allergic rhinitis    Carpal tunnel syndrome    Disorder of bone and cartilage    Hereditary and idiopathic peripheral neuropathy    GERD (gastroesophageal reflux disease)    Cooper's neuroma    Seizure disorder (H)    Osteoarthritis of right knee    Diabetes mellitus, type 2 (H)     History reviewed. No pertinent surgical history.    Social History     Tobacco Use    Smoking status: Never    Smokeless tobacco: Never   Substance Use Topics    Alcohol use: No     History reviewed. No pertinent family history.      Current Outpatient Medications   Medication Sig Dispense Refill    acetaminophen (TYLENOL ARTHRITIS PAIN) 650 MG CR tablet Take 650 mg by mouth as needed.      ALPRAZolam (XANAX) 0.25 MG tablet Take 1 tablet (0.25 mg) by mouth daily as needed for anxiety 8 tablet 0    amLODIPine (NORVASC) 10 MG tablet TAKE 1 TABLETS(10 MG) BY MOUTH DAILY 90 tablet 0    Blood Pressure Monitoring (OMRON 5 SERIES BP MONITOR) ADRIENNE 1 each by Other route daily      Calcium Carbonate-Vitamin D (CALCIUM 500 + D PO) Take 1 tablet by mouth daily.      fluticasone (FLONASE) 50 MCG/ACT spray USE 1 SPRAY IN EACH NOSTRIL EVERY DAY; SHAKE BEFORE USE      ibuprofen (ADVIL/MOTRIN) 800 MG tablet Take 1 tablet (800 mg) by mouth every 8 hours as needed for moderate pain 60 tablet 0    lamoTRIgine (LAMICTAL) 100 MG tablet Take one and one half tablets twice per day (total 300 mg per day) 540 tablet 1    levothyroxine (SYNTHROID/LEVOTHROID) 88 MCG tablet Take 1 tablet (88 mcg) by mouth daily 90 tablet 0    meclizine 25 MG CHEW Take 25 mg by mouth every 6 hours as needed for dizziness      simvastatin (ZOCOR) 20 MG tablet Take 1 tablet (20 mg) by mouth every evening 90 tablet 2     Allergies   Allergen Reactions    Codeine Nausea and Vomiting    No Clinical  Screening - See Comments      Fast injection of Contrast dye= Dizziness, nausea.     Tylenol With Codeine [Acetaminophen-Codeine] Nausea     Dizziness     Mammogram Screening: Mammogram Screening: Recommended mammography every 1-2 years with patient discussion and risk factor consideration  History of abnormal Pap smear: NO - age 65 - see link Cervical Cytology Screening Guidelines  Last 3 Pap and HPV Results:      FHS-7:        No data to display              Pertinent mammograms are reviewed under the imaging tab.    Review of Systems  Constitutional, HEENT, cardiovascular, pulmonary, GI, , musculoskeletal, neuro, skin, endocrine and psych systems are negative, except as otherwise noted.    OBJECTIVE:   /60 (BP Location: Right arm, Patient Position: Sitting, Cuff Size: Adult Regular)   Pulse 62   Temp 97.9  F (36.6  C) (Oral)   Resp 16   Wt 64.9 kg (143 lb)   LMP  (LMP Unknown)   SpO2 98%   BMI 27.93 kg/m   Estimated body mass index is 27.93 kg/m  as calculated from the following:    Height as of 9/16/22: 1.524 m (5').    Weight as of this encounter: 64.9 kg (143 lb).  Physical Exam  GENERAL: healthy, alert and no distress  RESP: lungs clear to auscultation - no rales, rhonchi or wheezes  CV: regular rate and rhythm, normal S1 S2, no S3 or S4, no murmur, click or rub, no peripheral edema and peripheral pulses strong  MS: no gross musculoskeletal defects noted, no edema  (+) nonulcerated varicose veins of the LLE   PSYCH: mentation appears normal, affect normal/bright    ASSESSMENT / PLAN:       ICD-10-CM    1. Encounter for Medicare annual wellness exam  Z00.00 Lipid Profile (Chol, Trig, HDL, LDL calc)     Comprehensive metabolic panel (BMP + Alb, Alk Phos, ALT, AST, Total. Bili, TP)     Hemoglobin A1c     Vitamin D Deficiency      2. Screen for colon cancer  Z12.11 Colonoscopy Screening  Referral      3. Visit for screening mammogram  Z12.31 MA SCREENING DIGITAL BILAT - Future  (s+30)       4. Meningioma (H)  D32.9 Neurosurgery Referral     MR Brain w/o & w Contrast      5. Vitamin D deficiency  E55.9       6. Varicose veins of calf  I83.90 Neurosurgery Referral     Vascular Medicine Referral      7. Post-menopausal  Z78.0 DEXA HIP/PELVIS/SPINE - Future      8. Hypothyroidism, unspecified type  E03.9 TSH with free T4 reflex      9. Localization-related symptomatic epilepsy and epileptic syndromes with complex partial seizures, not intractable, without status epilepticus (H)  G40.209 lamoTRIgine (LAMICTAL) 100 MG tablet      10. Hyperbilirubinemia  E80.6 Reticulocyte count including Retic Hgb      11. Type 2 diabetes mellitus without complication, without long-term current use of insulin (H)  E11.9       12. Encounter for immunization  Z23 COVID-19 12+ () (PFIZER)     INFLUENZA VACCINE 65+ (FLUZONE HD)          Home life: with granddaughter   Occupation: Retired  Sexually active: No  Safety concerns: None  Diet/exercise: BMI mildly elevated at 27.93.  Counseling provided  Family history of cancers:none she is aware of   Eye exam/dental exam:UTD   Alcohol use:none   Tobacco use/marijuana use/drug use:none   Mental health:stable  Vaccines:ordered   Blood work:ordered       Menses/menopause: no menopausal sxs   DEXA: ordered   Last Pap smear: pt doesn't recall last pap smear. Denies any abnl pap smears. Patient has technically aged out but given no evidence of two negative paps on file, would be reasonable to get pap today. Patient declines    Mammogram:ordered  Colonoscopy: ordered    IADLs/ADLs intact: yes   Driving: not right now, drivers license has     Memory:stable   Constipation, vision, hearing concerns:no   Falls over the last year:no    History of meningioma s/p resection 2018 x 2  Patient was supposed to follow-up with neurosurgery  Today she notes that she tried to reach out to her neurosurgeon but they are retired and she is requesting a new referral to neurosurgery  Plan:  -  Referral to neurosurgery placed  - Patient continues to remain asymptomatic from a meningioma standpoint  - will order updated MR Brain to assess size of the meningioma as it had been increasing in size on imaging in 2018    Epilepsy:   Patient has been on her lamictal but unclear for how long.  Was being managed by neuro   I will refill it for her today with ongoing management with neuro    Hypothyroidism:  Last time patient was seen, TSH was uptrending her and her Synthroid was increased to 88 mcg  Plan:  - We will recheck TSH    Left lower extremity varicose veins:  Patient notes that the area where she has collection of varicose veins becomes achy and feels heavy often.  She will also feel a throbbing pain in the area.  She has not noticed any ulceration or bleeding from the area.  Plan:  - Refer to vascular    Hyperbilirubinemia:  Chronic issue for the patient  Patient is s/p cholecystectomy  Will check bilirubin levels and reticulocyte count  As long as the reticulocyte count is negative and bilirubin levels are stable, okay to monitor she has no other evidence of liver damage on blood work.    Patient has been advised of split billing requirements and indicates understanding: Yes      COUNSELING:  Reviewed preventive health counseling, as reflected in patient instructions        She reports that she has never smoked. She has never used smokeless tobacco.      Appropriate preventive services were discussed with this patient, including applicable screening as appropriate for fall prevention, nutrition, physical activity, Tobacco-use cessation, weight loss and cognition.  Checklist reviewing preventive services available has been given to the patient.    Reviewed patients plan of care and provided an AVS. The Basic Care Plan (routine screening as documented in Health Maintenance) for Suzan meets the Care Plan requirement. This Care Plan has been established and reviewed with the Patient.        Angela Lacey    [facility]    Identified Health Risks:  I have reviewed Opioid Use Disorder and Substance Use Disorder risk factors and made any needed referrals.

## 2023-10-12 PROBLEM — E11.9 DIABETES MELLITUS, TYPE 2 (H): Status: ACTIVE | Noted: 2023-10-12

## 2023-10-12 RX ORDER — LAMOTRIGINE 100 MG/1
TABLET ORAL
Qty: 540 TABLET | Refills: 1 | Status: SHIPPED | OUTPATIENT
Start: 2023-10-12 | End: 2023-12-12

## 2023-10-13 LAB
RETIC HEMOGLOBIN: 30.1 PG (ref 28.2–35.7)
RETICS # AUTO: 0.07 10E6/UL (ref 0.03–0.1)
RETICS/RBC NFR AUTO: 1.2 % (ref 0.5–2)

## 2023-10-16 ENCOUNTER — OFFICE VISIT (OUTPATIENT)
Dept: NEUROSURGERY | Facility: CLINIC | Age: 68
End: 2023-10-16
Attending: STUDENT IN AN ORGANIZED HEALTH CARE EDUCATION/TRAINING PROGRAM
Payer: MEDICARE

## 2023-10-16 VITALS — DIASTOLIC BLOOD PRESSURE: 72 MMHG | OXYGEN SATURATION: 98 % | SYSTOLIC BLOOD PRESSURE: 145 MMHG | HEART RATE: 64 BPM

## 2023-10-16 DIAGNOSIS — D32.9 MENINGIOMA (H): ICD-10-CM

## 2023-10-16 DIAGNOSIS — I83.90 VARICOSE VEINS OF CALF: ICD-10-CM

## 2023-10-16 PROCEDURE — 99203 OFFICE O/P NEW LOW 30 MIN: CPT | Performed by: PHYSICIAN ASSISTANT

## 2023-10-16 ASSESSMENT — PAIN SCALES - GENERAL: PAINLEVEL: NO PAIN (0)

## 2023-10-16 NOTE — LETTER
10/16/2023         RE: Suzan Ballard  [address] [place] [address]        Dear Colleague,    Thank you for referring your patient, Suzan Ballard, to the [facility]. Please see a copy of my visit note below.    NEUROSURGERY CONSULTATION NOTE      Neurosurgery was asked to see this patient by Angela Lacey DO for evaluation of history of meningiomas.       CONSULTATION ASSESSMENT AND PLAN:    Patient has been advised to complete brain MRI with and without contrast and will plan for a telephone appointment follow up once completed with further recommendations and treatment options.     I spent more than 30 minutes in this apt, examining the pt, reviewing the scans, reviewing notes from chart, discussing treatment options with risks and benefits and coordinating care.     Shanda Olmos PA-C      HPI:  Ms. Ballard is a pleasant 67 year old female who was previously known to our practice having last seen Dr. Sauer in 2018. She has known history of recurrent falcine meningiomas status post resection 1995 and 2012 by Dr. Hickey. At her last appointment it was noted to have slight interval growth and discussion of gamma knife vs surveillance imaging of which she decided to continue to watch it with MRI. Though she was then lost to follow up and had traveled to the [facility] and was unable to return with COVID restrictions but has since returned and wants to re-establish care. She currently states that she is doing well. She denies any headaches, nausea, emesis, or visual changes. She denies any gait instability or balance difficulty. She has not been on anti-seizure medications nor re-established care with neurology since moving back. She notes that she has not had a seizure since 2012.       Past Medical History:   Diagnosis Date     History of meningioma     causes seizures     Hyperlipidemia      Hypertension      Seizures (H)        No past surgical history on  file.    REVIEW OF SYSTEMS:   14 point review of systems negative with exception to HPI     MEDICATIONS:    Current Outpatient Medications   Medication Sig Dispense Refill     acetaminophen (TYLENOL ARTHRITIS PAIN) 650 MG CR tablet Take 650 mg by mouth as needed.       ALPRAZolam (XANAX) 0.25 MG tablet Take 1 tablet (0.25 mg) by mouth daily as needed for anxiety 8 tablet 0     amLODIPine (NORVASC) 10 MG tablet TAKE 1 TABLETS(10 MG) BY MOUTH DAILY 90 tablet 0     Blood Pressure Monitoring (OMRON 5 SERIES BP MONITOR) ADRIENNE 1 each by Other route daily       Calcium Carbonate-Vitamin D (CALCIUM 500 + D PO) Take 1 tablet by mouth daily.       fluticasone (FLONASE) 50 MCG/ACT spray USE 1 SPRAY IN EACH NOSTRIL EVERY DAY; SHAKE BEFORE USE       ibuprofen (ADVIL/MOTRIN) 800 MG tablet Take 1 tablet (800 mg) by mouth every 8 hours as needed for moderate pain 60 tablet 0     lamoTRIgine (LAMICTAL) 100 MG tablet Take one and one half tablets twice per day (total 300 mg per day) 540 tablet 1     levothyroxine (SYNTHROID/LEVOTHROID) 88 MCG tablet Take 1 tablet (88 mcg) by mouth daily 90 tablet 0     meclizine 25 MG CHEW Take 25 mg by mouth every 6 hours as needed for dizziness       simvastatin (ZOCOR) 20 MG tablet Take 1 tablet (20 mg) by mouth every evening 90 tablet 2         ALLERGIES/SENSITIVITIES:     Allergies   Allergen Reactions     Codeine Nausea and Vomiting     No Clinical Screening - See Comments      Fast injection of Contrast dye= Dizziness, nausea.      Tylenol With Codeine [Acetaminophen-Codeine] Nausea     Dizziness       PERTINENT SOCIAL HISTORY:   Social History     Socioeconomic History     Marital status:    Tobacco Use     Smoking status: Never     Smokeless tobacco: Never   Vaping Use     Vaping Use: Never used   Substance and Sexual Activity     Alcohol use: No     Drug use: No     Social Determinants of Health     Financial Resource Strain: High Risk (10/11/2023)    Financial Resource Strain       Within the past 12 months, have you or your family members you live with been unable to get utilities (heat, electricity) when it was really needed?: Yes   Food Insecurity: Low Risk  (10/11/2023)    Food Insecurity      Within the past 12 months, did you worry that your food would run out before you got money to buy more?: No      Within the past 12 months, did the food you bought just not last and you didn t have money to get more?: No   Transportation Needs: High Risk (10/11/2023)    Transportation Needs      Within the past 12 months, has lack of transportation kept you from medical appointments, getting your medicines, non-medical meetings or appointments, work, or from getting things that you need?: Yes   Interpersonal Safety: Low Risk  (10/11/2023)    Interpersonal Safety      Do you feel physically and emotionally safe where you currently live?: Yes      Within the past 12 months, have you been hit, slapped, kicked or otherwise physically hurt by someone?: No      Within the past 12 months, have you been humiliated or emotionally abused in other ways by your partner or ex-partner?: No   Housing Stability: Low Risk  (10/11/2023)    Housing Stability      Do you have housing? : Yes      Are you worried about losing your housing?: No         FAMILY HISTORY:  No family history on file.     PHYSICAL EXAM:   Constitutional: LMP  (LMP Unknown)      Mental Status: A & O in no acute distress.  Affect is appropriate.  Speech is fluent.  Recent and remote memory are intact.  Attention span and concentration are normal.     Cranial Nerves: CN1: grossly intact per patient recall. CN2: No funduscopic exam performed. CN3,4 & 6: Pupillary light response, lateral and vertical gaze normal.  No nystagmus.  Visual fields are full to confrontation. CN5: Intact to touch CN7: No facial weakness, smile, facial symmetry intact. CN8: Intact to spoken voice. CN9&10: Gag reflex, uvula midline, palate rises with phonation. CN11: Shoulder  shrug 5/5 intact bilaterally. CN12: Tongue midline and moves freely from side to side.     Motor: POSITIVE pronator drift of upper extremity, left arm drifts down. Normal bulk and tone all muscle groups of upper and lower extremities.    Strength: 5/5 strength throughout      Sensory: Sensation intact bilaterally to light touch.     Coordination; finger to nose, heel to shin, rapid alternating movements smooth and rhythmic. Romberg intact. Heel/toe/tandem gait intact.  Normal gait and station.     Reflexes; supinator, biceps, triceps, knee/ ankle jerk intact. No hoffmans/babinski/ clonus.    IMAGING:  no current images for review          Cc:   Angela Lacey                 Again, thank you for allowing me to participate in the care of your patient.        Sincerely,        Shanda Olmos PA-C

## 2023-10-16 NOTE — PATIENT INSTRUCTIONS
Patient has been advised to complete brain MRI with and without contrast and will plan for a telephone appointment follow up once completed with further recommendations and treatment options.

## 2023-10-16 NOTE — PROGRESS NOTES
NEUROSURGERY CONSULTATION NOTE      Neurosurgery was asked to see this patient by Angela Lacey DO for evaluation of history of meningiomas.       CONSULTATION ASSESSMENT AND PLAN:    Patient has been advised to complete brain MRI with and without contrast and will plan for a telephone appointment follow up once completed with further recommendations and treatment options.     I spent more than 30 minutes in this apt, examining the pt, reviewing the scans, reviewing notes from chart, discussing treatment options with risks and benefits and coordinating care.     Shanda Olmos PA-C      HPI:  Ms. Ballard is a pleasant 67 year old female who was previously known to our practice having last seen Dr. Sauer in 2018. She has known history of recurrent falcine meningiomas status post resection 1995 and 2012 by Dr. Hickey. At her last appointment it was noted to have slight interval growth and discussion of gamma knife vs surveillance imaging of which she decided to continue to watch it with MRI. Though she was then lost to follow up and had traveled to the [facility] and was unable to return with COVID restrictions but has since returned and wants to re-establish care. She currently states that she is doing well. She denies any headaches, nausea, emesis, or visual changes. She denies any gait instability or balance difficulty. She has not been on anti-seizure medications nor re-established care with neurology since moving back. She notes that she has not had a seizure since 2012.       Past Medical History:   Diagnosis Date    History of meningioma     causes seizures    Hyperlipidemia     Hypertension     Seizures (H)        No past surgical history on file.    REVIEW OF SYSTEMS:   14 point review of systems negative with exception to HPI     MEDICATIONS:    Current Outpatient Medications   Medication Sig Dispense Refill    acetaminophen (TYLENOL ARTHRITIS PAIN) 650 MG CR tablet Take 650 mg by mouth as needed.       ALPRAZolam (XANAX) 0.25 MG tablet Take 1 tablet (0.25 mg) by mouth daily as needed for anxiety 8 tablet 0    amLODIPine (NORVASC) 10 MG tablet TAKE 1 TABLETS(10 MG) BY MOUTH DAILY 90 tablet 0    Blood Pressure Monitoring (OMRON 5 SERIES BP MONITOR) ADRIENNE 1 each by Other route daily      Calcium Carbonate-Vitamin D (CALCIUM 500 + D PO) Take 1 tablet by mouth daily.      fluticasone (FLONASE) 50 MCG/ACT spray USE 1 SPRAY IN EACH NOSTRIL EVERY DAY; SHAKE BEFORE USE      ibuprofen (ADVIL/MOTRIN) 800 MG tablet Take 1 tablet (800 mg) by mouth every 8 hours as needed for moderate pain 60 tablet 0    lamoTRIgine (LAMICTAL) 100 MG tablet Take one and one half tablets twice per day (total 300 mg per day) 540 tablet 1    levothyroxine (SYNTHROID/LEVOTHROID) 88 MCG tablet Take 1 tablet (88 mcg) by mouth daily 90 tablet 0    meclizine 25 MG CHEW Take 25 mg by mouth every 6 hours as needed for dizziness      simvastatin (ZOCOR) 20 MG tablet Take 1 tablet (20 mg) by mouth every evening 90 tablet 2         ALLERGIES/SENSITIVITIES:     Allergies   Allergen Reactions    Codeine Nausea and Vomiting    No Clinical Screening - See Comments      Fast injection of Contrast dye= Dizziness, nausea.     Tylenol With Codeine [Acetaminophen-Codeine] Nausea     Dizziness       PERTINENT SOCIAL HISTORY:   Social History     Socioeconomic History    Marital status:    Tobacco Use    Smoking status: Never    Smokeless tobacco: Never   Vaping Use    Vaping Use: Never used   Substance and Sexual Activity    Alcohol use: No    Drug use: No     Social Determinants of Health     Financial Resource Strain: High Risk (10/11/2023)    Financial Resource Strain     Within the past 12 months, have you or your family members you live with been unable to get utilities (heat, electricity) when it was really needed?: Yes   Food Insecurity: Low Risk  (10/11/2023)    Food Insecurity     Within the past 12 months, did you worry that your food would run out  before you got money to buy more?: No     Within the past 12 months, did the food you bought just not last and you didn t have money to get more?: No   Transportation Needs: High Risk (10/11/2023)    Transportation Needs     Within the past 12 months, has lack of transportation kept you from medical appointments, getting your medicines, non-medical meetings or appointments, work, or from getting things that you need?: Yes   Interpersonal Safety: Low Risk  (10/11/2023)    Interpersonal Safety     Do you feel physically and emotionally safe where you currently live?: Yes     Within the past 12 months, have you been hit, slapped, kicked or otherwise physically hurt by someone?: No     Within the past 12 months, have you been humiliated or emotionally abused in other ways by your partner or ex-partner?: No   Housing Stability: Low Risk  (10/11/2023)    Housing Stability     Do you have housing? : Yes     Are you worried about losing your housing?: No         FAMILY HISTORY:  No family history on file.     PHYSICAL EXAM:   Constitutional: LMP  (LMP Unknown)      Mental Status: A & O in no acute distress.  Affect is appropriate.  Speech is fluent.  Recent and remote memory are intact.  Attention span and concentration are normal.     Cranial Nerves: CN1: grossly intact per patient recall. CN2: No funduscopic exam performed. CN3,4 & 6: Pupillary light response, lateral and vertical gaze normal.  No nystagmus.  Visual fields are full to confrontation. CN5: Intact to touch CN7: No facial weakness, smile, facial symmetry intact. CN8: Intact to spoken voice. CN9&10: Gag reflex, uvula midline, palate rises with phonation. CN11: Shoulder shrug 5/5 intact bilaterally. CN12: Tongue midline and moves freely from side to side.     Motor: POSITIVE pronator drift of upper extremity, left arm drifts down. Normal bulk and tone all muscle groups of upper and lower extremities.    Strength: 5/5 strength throughout      Sensory: Sensation  intact bilaterally to light touch.     Coordination; finger to nose, heel to shin, rapid alternating movements smooth and rhythmic. Romberg intact. Heel/toe/tandem gait intact.  Normal gait and station.     Reflexes; supinator, biceps, triceps, knee/ ankle jerk intact. No hoffmans/babinski/ clonus.    IMAGING:  no current images for review          Cc:   Angela Lacey

## 2023-10-19 DIAGNOSIS — E03.9 ACQUIRED HYPOTHYROIDISM: ICD-10-CM

## 2023-10-19 DIAGNOSIS — E80.6 HYPERBILIRUBINEMIA: Primary | ICD-10-CM

## 2023-10-19 DIAGNOSIS — E87.6 HYPOKALEMIA: ICD-10-CM

## 2023-11-01 ENCOUNTER — HOSPITAL ENCOUNTER (OUTPATIENT)
Dept: MRI IMAGING | Facility: HOSPITAL | Age: 68
Discharge: HOME | End: 2023-11-01
Attending: STUDENT IN AN ORGANIZED HEALTH CARE EDUCATION/TRAINING PROGRAM | Admitting: STUDENT IN AN ORGANIZED HEALTH CARE EDUCATION/TRAINING PROGRAM
Payer: MEDICARE

## 2023-11-01 DIAGNOSIS — D32.9 MENINGIOMA (H): ICD-10-CM

## 2023-11-01 PROCEDURE — G1010 CDSM STANSON: HCPCS

## 2023-11-01 PROCEDURE — 255N000002 HC RX 255 OP 636: Mod: JZ | Performed by: STUDENT IN AN ORGANIZED HEALTH CARE EDUCATION/TRAINING PROGRAM

## 2023-11-01 PROCEDURE — A9585 GADOBUTROL INJECTION: HCPCS | Mod: JZ | Performed by: STUDENT IN AN ORGANIZED HEALTH CARE EDUCATION/TRAINING PROGRAM

## 2023-11-01 PROCEDURE — 70553 MRI BRAIN STEM W/O & W/DYE: CPT | Mod: MG

## 2023-11-01 RX ORDER — GADOBUTROL 604.72 MG/ML
0.1 INJECTION INTRAVENOUS ONCE
Status: COMPLETED | OUTPATIENT
Start: 2023-11-01 | End: 2023-11-01

## 2023-11-01 RX ADMIN — GADOBUTROL 6 ML: 604.72 INJECTION INTRAVENOUS at 18:25

## 2023-11-03 NOTE — PROGRESS NOTES
[facility]        Patient is here for a consult to discuss varicose vein(s). The patient has varicose veins that are problematic in bilateral legs. Symptoms patient has been experiencing are heaviness, aching,  itching, tiredness, cramps, discoloration, and  swelling. Patient states their varicose veins are bothersome when standing, sitting,  working, and household chores. Patient has been using pain medication - tylenol or anti-inflammatory's. Patient has not had recent imaging on legs done. Patient has done conservative measures which include: compression stockings, elevation, and stretching . Compression during travel. Treatment has been successful when wearing. Educated patient to work on conservative treatments.      Pt is currently taking Statin.    There were no vitals taken for this visit.    The provider has been notified that the patient has no concerns.     Questions patient would like addressed today are: N/A.    Refills are needed: N/A    Has homecare services and agency name:  No

## 2023-11-03 NOTE — PATIENT INSTRUCTIONS
Joe Mares,    Thank you for entrusting your care with us today. After your visit today with MD Luis Donald this is the plan that was discussed at your appointment.    Wear waist high compression 20-30mmHg daily. See below on more information on use    Follow up in 3 months with Dr. Lobo and an ultrasound prior      I am including additional information on these things and our contact information if you have any questions or concerns.   Please do not hesitate to reach out to us if you felt we did not answer your questions or you are unsure of the treatment plan after your visit today. Our number is [phone].Thank you for trusting us with your care.         Again thank you for your time.       Wear your compression stockings every day; put them on first thing in the morning and remove at bedtime    Replace your compression stockings every 4-6 months; these garments will lose their elasticity and become ineffective    Hang your stockings, do not put them in the dryer.  This will help prolong the life of your compressions stockings.     Elevate your legs periodically throughout the day, 30-60 minutes 1-3 times per day    Do calf pumping exercises periodically throughout the day.        Suzan    Thank you for entrusting your care with us at the [facility].      We are prescribing some compression stockings for you. I have included different suppliers that should help you get measured and fitting to ensure proper fitting socks. You should wear these stockings as much as you can. It is especially important to wear them with long periods of standing, sitting, long car rides or if you will be flying. Compression socks should get refilled every 4-6 months. They do not need to be worn at night while in bed. It is recommended to wear compression level of 20-30mmhg or higher from toes to knees.      We will perform an ultrasound today or prior to your appointment with us in 3 months time to  evaluate the valves in your veins.      We would like you to follow up in 3 months after wearing socks to see how you are doing.        Varicose Veins    Varicose veins are twisted, enlarged veins near the surface of the skin. They develop most often in the legs and ankles.    Some people may be more likely than others to get varicose veins because of several things. These include aging, pregnancy, being overweight, or because a parent has them. Standing or sitting for long periods of time can also increase risk of varicose veins.    Follow-up care is a key part of your treatment and safety. Be sure to make and go to all appointments, and call your doctor if you are having problems. It's also a good idea to know your test results and keep a list of the medicines you take.      Varicose veins are caused by weakened valves and veins in your legs. Normally, one-way valves in your veins keep blood flowing from your legs up toward your heart. When these valves don't work as they should, blood collects in your legs, and pressure builds up. The veins become weak, large, and twisted.    How can you care for yourself at home?  Wear compression stockings during the day to help relieve symptoms and improve blood flow. Talk to your doctor about which ones to get and where to get them.  Prop up your legs at or above the level of your heart when possible. Try to do this for about 30 minutes at a time, about 3 times a day. This helps keep the blood from pooling in your lower legs and improves blood flow to the rest of your body.  Avoid sitting and standing for long periods. This puts added stress on your veins.  Stay at a healthy weight. Lose weight if you need to.  Try to take several short walks every day.  Get at least 30 minutes of exercise on most days of the week. Walking is a good choice. You also may want to do other activities, such as running, swimming, cycling, or playing tennis or team sports.  Do calf muscle exercises  every day. When you are sitting down, rotate your feet at the ankles in both directions, making small circles. Extend your legs, and point and flex your feet.  Avoid crossing your legs at the knees when sitting.  Take good care of your skin. Treat cuts and scrapes on your legs right away. Keep your legs clean and moisturized to prevent drying and cracking. Prevent sunburns.  Do not smoke. Smoking can make varicose veins worse. If you need help quitting, talk to your doctor about stop-smoking programs and medicines. These can increase your chances of quitting for good.  If you bump your leg so hard that you know it is likely to bruise, prop up your leg and apply ice or cold packs right away. Apply the ice or cold pack for 10 to 20 minutes, 3 or more times a day. Put a thin cloth between the ice and your skin.  If you cut or scratch the skin over a vein, it may bleed a lot. Prop up your leg and apply firm pressure for a full 15 minutes.  If you have a blood clot in a varicose vein, you may have tenderness and swelling over the vein. The vein may feel firm. Be sure to call your doctor right away if you have these symptoms. If your doctor has told you how to care for the clot, follow the instructions.     Care may include the following:    Prop up your leg and apply a damp cloth that is warm or cool.  Ask your doctor if you can take an over-the-counter pain medicine, such as acetaminophen (Tylenol), ibuprofen (Advil, Motrin), or naproxen (Aleve). Be safe with medicines. Read and follow all instructions on the label.    When should you call for help?     Call 911 anytime you think you may need emergency care. For example, call if:    You have sudden chest pain and shortness of breath, or you cough up blood.    Call your doctor now or seek immediate medical care if:    You have signs of a blood clot in your leg (called a deep vein thrombosis), such as:  Pain in your calf, back of the knee, thigh, or groin.  Swelling in  the leg or groin.  A color change on the leg or groin. The skin may be reddish or purplish, depending on your usual skin color.  A varicose vein begins to bleed and you cannot stop it.  You have a tender lump in your leg.  You get an open sore.    Watch closely for changes in your health, and be sure to contact your doctor if:    Your varicose vein symptoms do not improve with home treatment.    Current as of: December 19, 2022  Author: [employer]wise Staff  Medical Review:Edison Garcia MD - Family Medicine & JENN Petty MD - Internal Medicine & Nathen Vera MD - Family Medicine & Darci Marks MD - Family Medicine & Jamel Perez MD - Diagnostic Radiology    Please bring your compression prescription to a home medical supply store. Here is a list of locations but not limited to.     [facility]  [address] [place] [address]  Phone: [phone]  Fax: [phone] [facility]  [address] [place] [address]  Phone: [phone]  Fax: [phone]   [facility]  [address] [place] [address]  Phone: [phone]  Fax: [phone] [facility]  [address]  [address] [place] [address]  Phone: [phone]  Fax: [phone]   [facility]  [address] [place] [address]  Phone: [phone] [facility]   [address] ([facility])  [place] [address]  Phone: [phone]   [facility]  [address]  [address] [place] [address]      Phone: [phone]  Fax: [phone] [place]  [address] [place] [address]      Phone: [phone]  Fax: [phone]     [facility] [email or web address]  [address] [place]  [address]  [phone]    [facility]  [email or web address]  [address] [place] [address]  Phone: [phone]     [address] [place] [address]  Phone: [phone]    [address] [place] [address]  Phone: [phone] [address], [place] [address]  Phone: [phone] [address], [place] [address]  Phone: [phone]    [facility] [email or web address]  [address], [place] [address]  Phone: [phone]    [address], [place] [address]  Phone: [phone] [address], [place] [address]  Phone: [phone]    [facility]  [email or web address]  [phone]

## 2023-11-06 ENCOUNTER — VIRTUAL VISIT (OUTPATIENT)
Dept: NEUROSURGERY | Facility: CLINIC | Age: 68
End: 2023-11-06
Payer: MEDICARE

## 2023-11-06 DIAGNOSIS — D32.9 MENINGIOMA (H): Primary | ICD-10-CM

## 2023-11-06 PROCEDURE — 99442 PR PHYSICIAN TELEPHONE EVALUATION 11-20 MIN: CPT | Mod: 95 | Performed by: PHYSICIAN ASSISTANT

## 2023-11-06 NOTE — LETTER
2023         RE: Suzan Ballard  [address]  [facility] [address]        Dear Colleague,    Thank you for referring your patient, Suzan Ballard, to the [facility]. Please see a copy of my visit note below.    Virtual visit     Start time: 2:40PM  End time after chartin:57PM  Patient location: [facility] [place], [place]  Provider location: [address] [facility] [address]     Ms. Ballard is a 61 yo F with PMH recurrent falcine meningiomas s/p resection ,  presenting today for follow up with imaging. Since last telephone visit, patient has been doing well. She continues to get intermittent headaches that are short lived and self resolved. She notes she will get these 1-2 times per month. Denies new or worsening head pain, vision changes, weakness, nausea, vomiting. Patient last saw Dr. Sauer in 2018 where options were discussed including Gamma Knife versus surgical resection versus watchful waiting. Patient opted for watchful waiting. She was stuck in [facility] for 19 months during COVID and was lost to follow up after that.     She denies new PMH.     EXAM: MR BRAIN W/O and W CONTRAST  LOCATION: [facility]  DATE: 2023     INDICATION:  Meningioma (H).  COMPARISON: 2018.  CONTRAST: Gadavist 6mL.  TECHNIQUE: Routine multiplanar multisequence head MRI without and with intravenous contrast.     FINDINGS:  INTRACRANIAL CONTENTS: Increased size of parasagittal midline cranial vertex posterior dural-based homogeneously enhancing mass measuring 27 x 31 x 28 mm, previously 22 x 21 x 23 mm. Lesion continues to cause focal effacement of the superior sagittal   sinus. No signal changes to suggest dural venous sinus thrombosis. Anterior falx dural-based enhancing mass measures 7 x 10 mm, previously 9 x 4 mm. No significant change in inferior left parietal lobule suspected cavernoma.     No acute or subacute  infarct. No mass, acute hemorrhage, or extra-axial fluid collections. Left superior parietal encephalomalacia and marginal gliosis. Scattered nonspecific T2/FLAIR hyperintensities within the cerebral white matter most consistent with   mild chronic microvascular ischemic change. Normal ventricles and sulci. Normal position of the cerebellar tonsils.     SELLA: No abnormality accounting for technique.     OSSEOUS STRUCTURES/SOFT TISSUES: Multifocal left parieto-occipital craniotomy changes. Persistent multifocal areas of calvarial marrow postcontrast enhancement most pronounced involving the bilateral parietal bones as well as the parasagittal occipital   bones. Intracranial vascular flow voids are maintained.      ORBITS: No abnormality accounting for technique.      SINUSES/MASTOIDS: No paranasal sinus mucosal disease. No middle ear or mastoid effusion.                                                                       IMPRESSION:  1.  Slightly increased size of residual posterior cranial vertex and anterior falx meningiomas since 2018. Otherwise no significant change.  2.  No acute intracranial process.              Plan  -Reviewed MRI findings with patient. Discussed options of Gamma Knife versus surgical resection versus watchful waiting (in which case would recommend short interval follow up 3 months due to increase).   -She would like to hear more about Gamma Knife. Will set her up to meet with Dr. Sauer in [place] to discuss further    Patient in agreement  Dr. Sauer in agreement    My Lu PA-C  [facility] [place] [address]    Tel [phone]           Again, thank you for allowing me to participate in the care of your patient.        Sincerely,        My Lu PA-C

## 2023-11-08 ENCOUNTER — LAB (OUTPATIENT)
Dept: LAB | Facility: CLINIC | Age: 68
End: 2023-11-08
Payer: MEDICARE

## 2023-11-08 DIAGNOSIS — E03.9 ACQUIRED HYPOTHYROIDISM: ICD-10-CM

## 2023-11-08 DIAGNOSIS — E80.6 HYPERBILIRUBINEMIA: ICD-10-CM

## 2023-11-08 DIAGNOSIS — E87.6 HYPOKALEMIA: ICD-10-CM

## 2023-11-08 LAB
ANION GAP SERPL CALCULATED.3IONS-SCNC: 11 MMOL/L (ref 7–15)
BUN SERPL-MCNC: 18.1 MG/DL (ref 8–23)
CALCIUM SERPL-MCNC: 9.5 MG/DL (ref 8.8–10.2)
CHLORIDE SERPL-SCNC: 106 MMOL/L (ref 98–107)
CREAT SERPL-MCNC: 0.47 MG/DL (ref 0.51–0.95)
DEPRECATED HCO3 PLAS-SCNC: 26 MMOL/L (ref 22–29)
EGFRCR SERPLBLD CKD-EPI 2021: >90 ML/MIN/1.73M2
GGT SERPL-CCNC: 29 U/L (ref 5–36)
GLUCOSE SERPL-MCNC: 106 MG/DL (ref 70–99)
POTASSIUM SERPL-SCNC: 3.5 MMOL/L (ref 3.4–5.3)
SODIUM SERPL-SCNC: 143 MMOL/L (ref 135–145)

## 2023-11-08 PROCEDURE — 36415 COLL VENOUS BLD VENIPUNCTURE: CPT

## 2023-11-08 PROCEDURE — 82977 ASSAY OF GGT: CPT

## 2023-11-08 PROCEDURE — 80048 BASIC METABOLIC PNL TOTAL CA: CPT

## 2023-11-09 ENCOUNTER — OFFICE VISIT (OUTPATIENT)
Dept: VASCULAR SURGERY | Facility: CLINIC | Age: 68
End: 2023-11-09
Attending: HOSPITALIST
Payer: MEDICARE

## 2023-11-09 VITALS
HEART RATE: 64 BPM | TEMPERATURE: 97.8 F | RESPIRATION RATE: 18 BRPM | DIASTOLIC BLOOD PRESSURE: 81 MMHG | SYSTOLIC BLOOD PRESSURE: 135 MMHG | WEIGHT: 144 LBS | BODY MASS INDEX: 28.12 KG/M2 | OXYGEN SATURATION: 98 %

## 2023-11-09 DIAGNOSIS — I83.893 SYMPTOMATIC VARICOSE VEINS OF BOTH LOWER EXTREMITIES: Primary | ICD-10-CM

## 2023-11-09 DIAGNOSIS — I83.90 VARICOSE VEINS OF CALF: ICD-10-CM

## 2023-11-09 PROCEDURE — G0463 HOSPITAL OUTPT CLINIC VISIT: HCPCS | Performed by: HOSPITALIST

## 2023-11-09 PROCEDURE — 99203 OFFICE O/P NEW LOW 30 MIN: CPT | Performed by: HOSPITALIST

## 2023-11-09 ASSESSMENT — PAIN SCALES - GENERAL: PAINLEVEL: NO PAIN (1)

## 2023-11-09 NOTE — PROGRESS NOTES
VASCULAR MEDICINE CONSULT NOTE          LOCATION:  [facility]       Date of Service: 11/9/2023      Primary Care Provider: Angela Lacey  Referring provider;  Angela Lacey      Reason for the visit/chief complaint:   Varicose veins      HPI:  Suzan Ballard is a pleasant 67 year old female who presents to our vascular medicine clinic for varicose veins.    Ms. Ballard has medical history significant for meningioma with seizures, hypertension and hyperlipidemia who reports she started recently noticed bilateral varicose veins that have been bothersome.  She has noted those for over a year now affecting both lower extremities but more pronounced on the left inner leg and right upper thigh.  She does report throbbing pain, tiredness and achiness in her legs with swelling as well.  Her symptoms are worse by the end of the day and her veins are usually very prominent.  She was previously told to wear compression stockings when she travels but she has not tried wearing them daily or more consistently.  Does prescribe herself is active although does not exercise.  Denies history of DVT or superficial vein thrombosis.  No prior history of wounds.    She never smoked.  She is overweight but not obese.  BMI 28.  Has had only 2 pregnancies in the past.  She has no family history of varicose veins.        REVIEW OF SYSTEMS:    A 12 point ROS was reviewed and is negative except what is mentioned in HPI.       Past medical history, surgical history, medications, family history, social history and allergies were reviewed. Pertinent points mentioned under HPI.                OBJECTIVE:    Vital signs:  /81   Pulse 64   Temp 97.8  F (36.6  C)   Resp 18   Wt 144 lb (65.3 kg)   LMP  (LMP Unknown)   SpO2 98%   BMI 28.12 kg/m    Wt Readings from Last 1 Encounters:   11/09/23 144 lb (65.3 kg)     Body mass index is 28.12 kg/m .    Physical exam:  General appearance: Pleasant female in no  apparent distress.    HEENT: NC/AT.    Neck: No jugular venous distension.   Heart: RRR. Normal S1, S2.   Chest: Clear to auscultation bilaterally.  Abdomen: Soft, nontender, nondistended. No dilated veins.  Extremities: No significant swelling noted to both lower extremity, only trace pitting edema.  Varicose veins noted on the left medial leg and right anterior thigh.  Both compressible.  Nontender.  No redness or warmth.  Skin: No stasis dermatitis, lipodermatosclerosis or skin ulcers.  Neurological: Alert, awake and oriented                 DIAGNOSTIC STUDIES:   Labs and diagnostics reviewed including outside records. Pertinent points are mentioned under HPI and assessment and plan sections.        ASSESSMENT AND PLAN:    Symptomatic varicose veins, C2    The patient is quite bothered by her varicose veins with associated throbbing pain and achiness.  We discussed compression therapy, leg elevation and exercise.  She is more interested in permanent solution and I believe it is reasonable to be evaluated for possible ablation.  She will start wearing compression stockings and try more conservative management first.      Recommendations:  Start wearing compression stockings 20-30 mmHg to both lower extremities.  Given that she has varicose veins in the thigh as well, she prefers waist high.  Discussed the importance of leg elevation and exercises.  Obtain bilateral lower extremity venous incompetency study.  Follow-up with Dr. Lobo in 3 months to assess her candidacy for ablation.    It was a pleasure meeting with Rita Elio in our clinic today.    Luis Donald MD  Vascular Medicine  November 9, 2023

## 2023-11-24 DIAGNOSIS — E03.9 ACQUIRED HYPOTHYROIDISM: ICD-10-CM

## 2023-11-24 DIAGNOSIS — I10 ESSENTIAL HYPERTENSION: ICD-10-CM

## 2023-11-28 RX ORDER — AMLODIPINE BESYLATE 10 MG/1
TABLET ORAL
Qty: 90 TABLET | Refills: 3 | Status: SHIPPED | OUTPATIENT
Start: 2023-11-28 | End: 2024-07-12

## 2023-11-28 RX ORDER — LEVOTHYROXINE SODIUM 88 UG/1
88 TABLET ORAL DAILY
Qty: 90 TABLET | Refills: 0 | Status: SHIPPED | OUTPATIENT
Start: 2023-11-28 | End: 2024-02-28

## 2023-12-01 ENCOUNTER — OFFICE VISIT (OUTPATIENT)
Dept: NEUROSURGERY | Facility: CLINIC | Age: 68
End: 2023-12-01
Payer: MEDICARE

## 2023-12-01 VITALS
TEMPERATURE: 97.6 F | DIASTOLIC BLOOD PRESSURE: 76 MMHG | HEART RATE: 63 BPM | SYSTOLIC BLOOD PRESSURE: 123 MMHG | OXYGEN SATURATION: 99 %

## 2023-12-01 DIAGNOSIS — D32.0 BENIGN NEOPLASM OF CEREBRAL MENINGES (H): ICD-10-CM

## 2023-12-01 DIAGNOSIS — D32.9 MENINGIOMA (H): Primary | ICD-10-CM

## 2023-12-01 PROCEDURE — 99204 OFFICE O/P NEW MOD 45 MIN: CPT | Performed by: NEUROLOGICAL SURGERY

## 2023-12-01 NOTE — LETTER
12/1/2023         RE: Suzan Ballard  [address] [facility] [address]        Dear Colleague,    Thank you for referring your patient, Suzan Ballard, to the [facility] NEUROLOGY [facility]. Please see a copy of my visit note below.    It was a pleasure to see Suzan Ballard today in Neurosurgery Clinic. She is a 68 year old female who has a history of 2 prior craniotomies for meningioma.  The last of which was in 2012 at [facility] in [place].  Pathology report from this surgery was found in care everywhere and shows a grade 1 meningioma.    The patient has been doing reasonably well but has recently had follow-up imaging that shows growth of residual meningioma in the sagittal sinus and a smaller one along the falx.  She denies any new symptoms.  She previously had seizures but is weaned off her seizure medication without further seizures.  She occasionally has some headaches.    Vitals:    12/01/23 0937   BP: 123/76   Pulse: 63   Temp: 97.6  F (36.4  C)   SpO2: 99%     Estimated body mass index is 28.12 kg/m  as calculated from the following:    Height as of 9/16/22: 1.524 m (5').    Weight as of 11/9/23: 65.3 kg (144 lb).  Data Unavailable    Well-healed cranial incision  Bilateral upper and lower extremity strength is 5 out of 5 in all muscle groups  No pronator drift.    Imaging: Review of MRIs from 2012, 2018, 2023 shows growth of the meningioma in the sagittal sinus along with a smaller Folstein meningioma.  The imaging was reviewed with the patient shown to the patient in clinic today.    Assessment: Progressive meningioma.    Plan: We discussed treatment options and I think the patient would be a good candidate for radiosurgical treatment of the 2 lesions.  We discussed mask and framed based therapy and I think a single fraction frame based treatment would be quite reasonable.  We will work on arranging this with radiation oncology.         Again, thank you for allowing me to  participate in the care of your patient.        Sincerely,        Narinder Sauer MD

## 2023-12-01 NOTE — TELEPHONE ENCOUNTER
December 1, 2023 2:14 PM CARSON   Internal Referral, Per call to PT, confirmed no previous radiation or outside records needed

## 2023-12-01 NOTE — PROGRESS NOTES
It was a pleasure to see Suzan Ballard today in Neurosurgery Clinic. She is a 68 year old female who has a history of 2 prior craniotomies for meningioma.  The last of which was in 2012 at [facility] in [place].  Pathology report from this surgery was found in care everywhere and shows a grade 1 meningioma.    The patient has been doing reasonably well but has recently had follow-up imaging that shows growth of residual meningioma in the sagittal sinus and a smaller one along the falx.  She denies any new symptoms.  She previously had seizures but is weaned off her seizure medication without further seizures.  She occasionally has some headaches.    Vitals:    12/01/23 0937   BP: 123/76   Pulse: 63   Temp: 97.6  F (36.4  C)   SpO2: 99%     Estimated body mass index is 28.12 kg/m  as calculated from the following:    Height as of 9/16/22: 1.524 m (5').    Weight as of 11/9/23: 65.3 kg (144 lb).  Data Unavailable    Well-healed cranial incision  Bilateral upper and lower extremity strength is 5 out of 5 in all muscle groups  No pronator drift.    Imaging: Review of MRIs from 2012, 2018, 2023 shows growth of the meningioma in the sagittal sinus along with a smaller Folstein meningioma.  The imaging was reviewed with the patient shown to the patient in clinic today.    Assessment: Progressive meningioma.    Plan: We discussed treatment options and I think the patient would be a good candidate for radiosurgical treatment of the 2 lesions.  We discussed mask and framed based therapy and I think a single fraction frame based treatment would be quite reasonable.  We will work on arranging this with radiation oncology.

## 2023-12-01 NOTE — NURSING NOTE
Suzan Ballard is a 68 year old female who presents for:  Chief Complaint   Patient presents with    Consult     Gamma knife consult        Initial Vitals:  /76   Pulse 63   LMP  (LMP Unknown)   SpO2 99%  Estimated body mass index is 28.12 kg/m  as calculated from the following:    Height as of 9/16/22: 5' (1.524 m).    Weight as of 11/9/23: 144 lb (65.3 kg).. There is no height or weight on file to calculate BSA. BP completed using cuff size: regular  Data Unavailable    Nursing Comments:     Daniel Salas

## 2023-12-04 ENCOUNTER — PRE VISIT (OUTPATIENT)
Dept: RADIATION ONCOLOGY | Facility: CLINIC | Age: 68
End: 2023-12-04
Payer: MEDICARE

## 2023-12-04 NOTE — NURSING NOTE
Date: 2023   Age: 68 year old  Ethnicity:  Vietnamese   Sex: female  : 1955   Lives In: [place], [place]      Diagnosis: Meningiomas     Prior radiation therapy:   Site Treated: at  Facility: at  Dates: at  Dose: at    Site Treated: at  Facility: at  Dates: at  Dose: at    Prior chemotherapy:   Protocol: at  Facility: atr  Dates: a    RN time with patient:  Educated on Gamma Knife:    Doctors:  Dr. Narinder Sauer, Dr. Raji Perez-neurologist,     Pain at time of consult:  Does pt have a living will:  Is patient pregnant: Age 68  Does pt have implanted cardiac device:    Has pt fallen in past week:  Does pt feel steady on her feet:     Review Since Diagnosis:  Symptoms  ; resection of a falcine meningioma by Dr. Mcgregor  11; Brain MRI, seen again nodular focus of meningioma deep to the left crani flap, present 10/30/09, measures 1.5 x 1.1 cm, increased slightly from .    2012; episode of trembling of right arm, found two small meningiomas in the where had previous resection  12; resection of recurrent falcine meningioma by Dr. Hickey at [facility] in [place].  Inferior and superior left parietal-occipital brain tumors showed Meningioma Grade I  Per pt Dr. Hickey wanted pt to have a referral to Cyber Knife as that would be next option if recurrence, pt did not like the idea of radiation, so sought second opinion   13; Brain MRI, development of rim enhancement around operative bed, favor post op changes.  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 Monreal for second opinion regarding the recurrent meningioma resected in 2012.  Reviewed films, does not feel needs any type of radiation at this point  10/14/13; Brain MRI, 0.9 cm nodular enhancement along the bed of the superior sagittal sinus in anterior parietal region                                    0.7 cm meningioma along the right side of the mid frontal  falx   11/12/13; saw Dr. Monreal follow up six months   8/5/14; Brain MRI, nodule superior aspect of sagittal sinus 1.1 x 1.4 cm (notes previously 1.3 x 0.9 cm)  8/5/14; pt saw Dr. Monreal, retired as cognitive issues after second surgery making work difficult, occasional headaches. Per MRI that day Dr. Monreal noted stable, no recurrence at this time   Continue to see enhancement of the calvarium unsure if meningioma or her Paget's disease.  Felt could go two years before next MRI    8/13/17; Brain MRI, solid nodular presumed meningioma posterior aspect of mid third superior sagittal sinus 1.9 x 1.4 x 1.8 cm                                  0.5 x 0.8 cm high right frontal parafalcine meningioma  10/28/17; pt saw Dr. Perez, wants pt to see neurosurgery again and pt requested Dr. Sauer   11/6/17; referred to Dr. Sauer by Dr. Perez after a repeat MRI was done.  Pt reports dizziness which she states has  been going on for awhile.  Pt has retired since last resection as some disability involving her memory.  Notes mild increase in size of anterior and posterior falcine meningioma.  Treatment options presented, decision to watch, return one year.    11/4/18; Brain MRI, slight increase in solid nodular presumed meningioma posterior aspect of mid third superior sagittal sinus 2.2 x 1.5 x 2.0 cm                                   Stable high right frontal parafalcine meningioma 0.5 x 0.8 cm                                    Stable postsurgical changes left parietal                                   No new foci  11/5/18; pt saw Dr. Sauer, pt feels dizziness stable, memory somewhat worse, headaches transient, not severe.  Right frontal parafalcine meningioma stable, posterior falcine meningioma slightly increased in size.  Discussed options again, will watchful wait.    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 Sauer, growth of residual meningioma in sagittal sinus and the smaller one along the falx.  Pt off seizure meds and no further seizures, some headaches.  Discussed options again and feels time to treat, Gamma Knife favored for the two lesions.  Referral to Rad/Onc     Chief Complaint: at consult

## 2023-12-05 DIAGNOSIS — D32.9 MENINGIOMA (H): Primary | ICD-10-CM

## 2023-12-11 NOTE — PROGRESS NOTES
Department of Radiation Oncology  [address] [address]  [phone]       Consultation Note    Name: Suzan Ballard MRN: [id number]   : 1955   Date of Service: Dec 12, 2023 Referring: Dr. Sauer     Diagnosis: Meningioma  Stage: Benign    History of Present Illness   In , the patient underwent resection of a falcine meningioma by Dr. Mcgregor. A subsequent Brain MRI on 11 revealed a nodular focus of meningioma, slightly increased in size since . In 2012, the patient experienced right arm trembling, and two small meningiomas were discovered at the previous resection site. This led to another resection of recurrent falcine meningioma on 12 at [facility], confirming Grade I Meningioma. Postoperatively, Cyber Knife treatment was suggested, but the patient preferred a second opinion due to radiation concerns.    Further MRIs in  showed postoperative changes and enhancements associated with Paget's disease. Dr. Monreal, consulted for a second opinion, did not recommend radiation therapy at the time. MRIs in  and  indicated nodular enhancements and stable condition, with Dr. Monreal suggesting a two-year gap before the next MRI.    On 17, an MRI revealed an increased size of meningiomas, prompting a consultation with Dr. Perez on 10/28/17, who referred the patient to Dr. Sauer. On 17, Dr. Sauer noted a mild increase in the size of the meningiomas and a decision to observe was made, with a follow-up scheduled in a year.    MRI on 18 showed a slight increase in the size of the meningioma in the superior sagittal sinus and stability in the right frontal parafalcine meningioma. The patient, experiencing stable dizziness and worsening memory, saw Dr. Sauer on 18, opting for watchful waiting.    On 23, an MRI indicated an increase in the size of the parasagittal mass and the  right frontal parafalcine meningioma. On 12/1/23, Dr. Sauer observed growth in both meningiomas and, considering the patient's condition, recommended Gamma Knife treatment for the two lesions, leading to a referral to Radiation Oncology. Dr. Sauer discussed mask and framed based therapy and thinks a single fraction frame based treatment would be quite reasonable.    The patient has no prior history of radiation treatment.  On interview today, patient was accompanied by her daughter.  Overall, patient is feeling well, reports occasional headaches, occasional episodes of nausea that occur about once a month, denies any dizziness, muscle weakness, numbness or tingling, sensory deficits, vision changes, balance problems or falls.  She has reviewed her options with neurosurgery prior to her consultation with us today, and is interested in moving forward gamma knife radiosurgery.        Past Medical History:  Past Medical History:   Diagnosis Date    Disorder of thyroid     History of meningioma     causes seizures    Hyperlipidemia     Hypertension     Seizures (H)      Past Surgical History:  Past Surgical History:   Procedure Laterality Date    CHOLECYSTECTOMY, LAPOROSCOPIC      CRANIOTOMY  1995    CRANIOTOMY  2012     Medications:  Current Outpatient Medications   Medication    acetaminophen (TYLENOL ARTHRITIS PAIN) 650 MG CR tablet    ALPRAZolam (XANAX) 0.25 MG tablet    amLODIPine (NORVASC) 10 MG tablet    Blood Pressure Monitoring (OMRON 5 SERIES BP MONITOR) ADRIENNE    Calcium Carbonate-Vitamin D (CALCIUM 500 + D PO)    fluticasone (FLONASE) 50 MCG/ACT spray    ibuprofen (ADVIL/MOTRIN) 800 MG tablet    lamoTRIgine (LAMICTAL) 100 MG tablet    levothyroxine (SYNTHROID/LEVOTHROID) 88 MCG tablet    meclizine 25 MG CHEW    simvastatin (ZOCOR) 20 MG tablet     No current facility-administered medications for this visit.     Allergies:  Allergies   Allergen Reactions    Codeine Nausea and Vomiting    No Clinical Screening -  See Comments      Fast injection of Contrast dye= Dizziness, nausea.     Tylenol With Codeine [Acetaminophen-Codeine] Nausea     Dizziness     Social History:  Social History     Socioeconomic History    Marital status:      Spouse name: Not on file    Number of children: Not on file    Years of education: Not on file    Highest education level: Not on file   Occupational History    Not on file   Tobacco Use    Smoking status: Never    Smokeless tobacco: Never   Vaping Use    Vaping Use: Never used   Substance and Sexual Activity    Alcohol use: No    Drug use: No    Sexual activity: Not on file   Other Topics Concern    Not on file   Social History Narrative    Not on file     Social Determinants of Health     Financial Resource Strain: High Risk (10/11/2023)    Financial Resource Strain     Within the past 12 months, have you or your family members you live with been unable to get utilities (heat, electricity) when it was really needed?: Yes   Food Insecurity: Low Risk  (10/11/2023)    Food Insecurity     Within the past 12 months, did you worry that your food would run out before you got money to buy more?: No     Within the past 12 months, did the food you bought just not last and you didn t have money to get more?: No   Transportation Needs: High Risk (10/11/2023)    Transportation Needs     Within the past 12 months, has lack of transportation kept you from medical appointments, getting your medicines, non-medical meetings or appointments, work, or from getting things that you need?: Yes   Physical Activity: Not on file   Stress: Not on file   Social Connections: Not on file   Interpersonal Safety: Low Risk  (10/11/2023)    Interpersonal Safety     Do you feel physically and emotionally safe where you currently live?: Yes     Within the past 12 months, have you been hit, slapped, kicked or otherwise physically hurt by someone?: No     Within the past 12 months, have you been humiliated or emotionally  abused in other ways by your partner or ex-partner?: No   Housing Stability: Low Risk  (10/11/2023)    Housing Stability     Do you have housing? : Yes     Are you worried about losing your housing?: No     Family History:  No family history on file.    Review of Systems   A 12-point review of systems was performed. Pertinent findings are noted in the HPI.    Physical Exam   ECOG Status: 0    GEN: Appears well, alert, oriented, and in NAD  HEENT: EOMI, normal conjunctiva, MMM  SKIN: Normal color and turgor  NEURO: No focal deficits, CN 3-12 grossly intact, normal and symmetric motor and sensory exam in bilateral upper and lower extremities, normal gait  PSYCH: Appropriate mood and affect    Imaging/Path/Labs   REVIEWED    Assessment    Ms. Ballard is a 68 year old female with recurrent meningioma in the sagittal sinus, with a more prominent mass posteriorly as well as a smaller subfalcine second meningioma anteriorly.    Plan   We reviewed the patient's imaging, pathology, as well as her past treatments for meningioma.  For her recurrent meningioma, we recommended gamma knife radiosurgery to both lesions.  We noted that her larger posterior meningioma mass is amenable to SRS and we will plan to treat with a single fraction frame based treatment for now and will defer final fractionation schedule pending the treatment planning MRI.      Tentative plan is to treat posterior lesion to 12-14 Cisneros in 1 fraction.  We reviewed the risk and benefits of gamma knife SRS, as well as alternative treatments.  We reviewed the acutely toxicities of radiosurgery to the brain.  Informed sent was obtained.  Will plan when the patient back on 12/19/2023 for frame placement, treatment planning MRI, volume contouring, and delivery of radiation, in coordination with neurosurgery.    Patient was seen and discussed with my attending physician, Dr. Forest Gonzalez MD, MS PGY-2  Department of Radiation Oncology  [facility]  [facility]  Phone: [phone]    I saw the patient with the resident.  I agree with the resident's note and plan of care.      GREGG Garg M.D.  Department of Radiation Oncology  [facility]

## 2023-12-12 ENCOUNTER — PRE VISIT (OUTPATIENT)
Dept: RADIATION ONCOLOGY | Facility: CLINIC | Age: 68
End: 2023-12-12
Payer: MEDICARE

## 2023-12-12 ENCOUNTER — OFFICE VISIT (OUTPATIENT)
Dept: RADIATION ONCOLOGY | Facility: CLINIC | Age: 68
End: 2023-12-12
Attending: RADIOLOGY
Payer: MEDICARE

## 2023-12-12 VITALS
OXYGEN SATURATION: 96 % | RESPIRATION RATE: 16 BRPM | BODY MASS INDEX: 28.22 KG/M2 | HEIGHT: 59 IN | DIASTOLIC BLOOD PRESSURE: 72 MMHG | SYSTOLIC BLOOD PRESSURE: 133 MMHG | HEART RATE: 74 BPM | WEIGHT: 140 LBS

## 2023-12-12 DIAGNOSIS — D32.9 MENINGIOMA (H): Primary | ICD-10-CM

## 2023-12-12 LAB
ANION GAP SERPL CALCULATED.3IONS-SCNC: 12 MMOL/L (ref 7–15)
CHLORIDE SERPL-SCNC: 106 MMOL/L (ref 98–107)
CREAT SERPL-MCNC: 0.55 MG/DL (ref 0.51–0.95)
DEPRECATED HCO3 PLAS-SCNC: 25 MMOL/L (ref 22–29)
EGFRCR SERPLBLD CKD-EPI 2021: >90 ML/MIN/1.73M2
POTASSIUM SERPL-SCNC: 2.9 MMOL/L (ref 3.4–5.3)
SODIUM SERPL-SCNC: 143 MMOL/L (ref 135–145)

## 2023-12-12 PROCEDURE — 80051 ELECTROLYTE PANEL: CPT | Performed by: RADIOLOGY

## 2023-12-12 PROCEDURE — 82565 ASSAY OF CREATININE: CPT | Performed by: RADIOLOGY

## 2023-12-12 PROCEDURE — 99204 OFFICE O/P NEW MOD 45 MIN: CPT | Mod: GC | Performed by: RADIOLOGY

## 2023-12-12 PROCEDURE — G0463 HOSPITAL OUTPT CLINIC VISIT: HCPCS | Performed by: RADIOLOGY

## 2023-12-12 PROCEDURE — 36415 COLL VENOUS BLD VENIPUNCTURE: CPT | Performed by: RADIOLOGY

## 2023-12-12 ASSESSMENT — ENCOUNTER SYMPTOMS
HEADACHES: 1
DOUBLE VISION: 0
PHOTOPHOBIA: 0
CONSTIPATION: 0
BLURRED VISION: 0
DIARRHEA: 0
NAUSEA: 1
BLOOD IN STOOL: 0
FEVER: 0
DEPRESSION: 0
SEIZURES: 1
VOMITING: 0
COUGH: 0
WEIGHT LOSS: 0
SHORTNESS OF BREATH: 0

## 2023-12-12 NOTE — LETTER
2023         RE: Suzan Ballard  [address]  [facility] [address]        Dear Colleague,    Thank you for referring your patient, Suzan Ballard, to the [facility] RADIATION ONCOLOGY GAMMA KNIFE. Please see a copy of my visit note below.       Department of Radiation Oncology  [facility]  [address] [place] [address]  [phone]       Consultation Note    Name: Suzan Ballard MRN: [id number]   : 1955   Date of Service: Dec 12, 2023 Referring: Dr. Sauer     Diagnosis: Meningioma  Stage: Benign    History of Present Illness   In , the patient underwent resection of a falcine meningioma by Dr. Mcgregor. A subsequent Brain MRI on 11 revealed a nodular focus of meningioma, slightly increased in size since . In 2012, the patient experienced right arm trembling, and two small meningiomas were discovered at the previous resection site. This led to another resection of recurrent falcine meningioma on 12 at [facility], confirming Grade I Meningioma. Postoperatively, Cyber Knife treatment was suggested, but the patient preferred a second opinion due to radiation concerns.    Further MRIs in  showed postoperative changes and enhancements associated with Paget's disease. Dr. Monreal, consulted for a second opinion, did not recommend radiation therapy at the time. MRIs in  and  indicated nodular enhancements and stable condition, with Dr. Monreal suggesting a two-year gap before the next MRI.    On 17, an MRI revealed an increased size of meningiomas, prompting a consultation with Dr. Perez on 10/28/17, who referred the patient to Dr. Sauer. On 17, Dr. Sauer noted a mild increase in the size of the meningiomas and a decision to observe was made, with a follow-up scheduled in a year.    MRI on 18 showed a slight increase in the size of the meningioma in the superior sagittal sinus and  stability in the right frontal parafalcine meningioma. The patient, experiencing stable dizziness and worsening memory, saw Dr. Sauer on 11/5/18, opting for watchful waiting.    On 11/1/23, an MRI indicated an increase in the size of the parasagittal mass and the right frontal parafalcine meningioma. On 12/1/23, Dr. Sauer observed growth in both meningiomas and, considering the patient's condition, recommended Gamma Knife treatment for the two lesions, leading to a referral to Radiation Oncology. Dr. Sauer discussed mask and framed based therapy and thinks a single fraction frame based treatment would be quite reasonable.    The patient has no prior history of radiation treatment.  On interview today, patient was accompanied by her daughter.  Overall, patient is feeling well, reports occasional headaches, occasional episodes of nausea that occur about once a month, denies any dizziness, muscle weakness, numbness or tingling, sensory deficits, vision changes, balance problems or falls.  She has reviewed her options with neurosurgery prior to her consultation with us today, and is interested in moving forward gamma knife radiosurgery.        Past Medical History:  Past Medical History:   Diagnosis Date    Disorder of thyroid     History of meningioma     causes seizures    Hyperlipidemia     Hypertension     Seizures (H)      Past Surgical History:  Past Surgical History:   Procedure Laterality Date    CHOLECYSTECTOMY, LAPOROSCOPIC      CRANIOTOMY  1995    CRANIOTOMY  2012     Medications:  Current Outpatient Medications   Medication    acetaminophen (TYLENOL ARTHRITIS PAIN) 650 MG CR tablet    ALPRAZolam (XANAX) 0.25 MG tablet    amLODIPine (NORVASC) 10 MG tablet    Blood Pressure Monitoring (OMRON 5 SERIES BP MONITOR) ADRIENNE    Calcium Carbonate-Vitamin D (CALCIUM 500 + D PO)    fluticasone (FLONASE) 50 MCG/ACT spray    ibuprofen (ADVIL/MOTRIN) 800 MG tablet    lamoTRIgine (LAMICTAL) 100 MG tablet    levothyroxine  (SYNTHROID/LEVOTHROID) 88 MCG tablet    meclizine 25 MG CHEW    simvastatin (ZOCOR) 20 MG tablet     No current facility-administered medications for this visit.     Allergies:  Allergies   Allergen Reactions    Codeine Nausea and Vomiting    No Clinical Screening - See Comments      Fast injection of Contrast dye= Dizziness, nausea.     Tylenol With Codeine [Acetaminophen-Codeine] Nausea     Dizziness     Social History:  Social History     Socioeconomic History    Marital status:      Spouse name: Not on file    Number of children: Not on file    Years of education: Not on file    Highest education level: Not on file   Occupational History    Not on file   Tobacco Use    Smoking status: Never    Smokeless tobacco: Never   Vaping Use    Vaping Use: Never used   Substance and Sexual Activity    Alcohol use: No    Drug use: No    Sexual activity: Not on file   Other Topics Concern    Not on file   Social History Narrative    Not on file     Social Determinants of Health     Financial Resource Strain: High Risk (10/11/2023)    Financial Resource Strain     Within the past 12 months, have you or your family members you live with been unable to get utilities (heat, electricity) when it was really needed?: Yes   Food Insecurity: Low Risk  (10/11/2023)    Food Insecurity     Within the past 12 months, did you worry that your food would run out before you got money to buy more?: No     Within the past 12 months, did the food you bought just not last and you didn t have money to get more?: No   Transportation Needs: High Risk (10/11/2023)    Transportation Needs     Within the past 12 months, has lack of transportation kept you from medical appointments, getting your medicines, non-medical meetings or appointments, work, or from getting things that you need?: Yes   Physical Activity: Not on file   Stress: Not on file   Social Connections: Not on file   Interpersonal Safety: Low Risk  (10/11/2023)    Interpersonal  Safety     Do you feel physically and emotionally safe where you currently live?: Yes     Within the past 12 months, have you been hit, slapped, kicked or otherwise physically hurt by someone?: No     Within the past 12 months, have you been humiliated or emotionally abused in other ways by your partner or ex-partner?: No   Housing Stability: Low Risk  (10/11/2023)    Housing Stability     Do you have housing? : Yes     Are you worried about losing your housing?: No     Family History:  No family history on file.    Review of Systems   A 12-point review of systems was performed. Pertinent findings are noted in the HPI.    Physical Exam   ECOG Status: 0    GEN: Appears well, alert, oriented, and in NAD  HEENT: EOMI, normal conjunctiva, MMM  SKIN: Normal color and turgor  NEURO: No focal deficits, CN 3-12 grossly intact, normal and symmetric motor and sensory exam in bilateral upper and lower extremities, normal gait  PSYCH: Appropriate mood and affect    Imaging/Path/Labs   REVIEWED    Assessment    Ms. Ballard is a 68 year old female with recurrent meningioma in the sagittal sinus, with a more prominent mass posteriorly as well as a smaller subfalcine second meningioma anteriorly.    Plan   We reviewed the patient's imaging, pathology, as well as her past treatments for meningioma.  For her recurrent meningioma, we recommended gamma knife radiosurgery to both lesions.  We noted that her larger posterior meningioma mass is amenable to SRS and we will plan to treat with a single fraction frame based treatment for now and will defer final fractionation schedule pending the treatment planning MRI.      Tentative plan is to treat posterior lesion to 12-14 Cisneros in 1 fraction.  We reviewed the risk and benefits of gamma knife SRS, as well as alternative treatments.  We reviewed the acutely toxicities of radiosurgery to the brain.  Informed sent was obtained.  Will plan when the patient back on 12/19/2023 for frame  placement, treatment planning MRI, volume contouring, and delivery of radiation, in coordination with neurosurgery.    Patient was seen and discussed with my attending physician, Dr. Forest Gonzalez MD, MS PGY-2  Department of Radiation Oncology  [facility]  Phone: [phone]    I saw the patient with the resident.  I agree with the resident's note and plan of care.      GREGG Garg M.D.  Department of Radiation Oncology  [facility]       HPI    Date: 2023   Age: 68 year old  Ethnicity:  Kenyan   Sex: female  : 1955   Lives In: [place], [place]      Diagnosis: Meningiomas     Prior radiation therapy:   none    Prior chemotherapy:   none    RN time with patient: 55 minutes in person  Educated on Gamma Knife: yes    Doctors:  Dr. Narinder Sauer, Dr. Thierry Lobo-neurologist,  Dr. Angela Lacey-primary    Pain at time of consult: pt pain left foot, thinks arthritis, no other pain  Does pt have a living will: pt thinks she has  Is patient pregnant: Age 68  Does pt have implanted cardiac device: pt has no implanted cardiac device    Has pt fallen in past week: pt has not fallen  Does pt feel steady on her feet: pt feels steady on her feet     Review Since Diagnosis:  Pt presented with nausea and vomiting  ; resection of a falcine meningioma by Dr. Mcgregor, pt notes not a total resection due to location  11; Brain MRI, seen again nodular focus of meningioma deep to the left crani flap, present 10/30/09, measures 1.5 x 1.1 cm, increased slightly from .    2012; episode of trembling of right arm, found two small meningiomas in the where had previous resection  12; resection of recurrent falcine meningioma by Dr. Hickey at [facility] in [place].  Inferior and superior left parietal-occipital brain tumors showed Meningioma Grade I  Per pt Dr. Hickey wanted pt to have a referral to Cyber Knife as that would be  next option if recurrence, pt did not like the idea of radiation, so sought second opinion   2/25/13; Brain MRI, development of rim enhancement around operative bed, favor post op changes.  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 Monreal for second opinion regarding the recurrent meningioma resected in December 2012.  Reviewed films, does not feel needs any type of radiation at this point  10/14/13; Brain MRI, 0.9 cm nodular enhancement along the bed of the superior sagittal sinus in anterior parietal region                                    0.7 cm meningioma along the right side of the mid frontal falx   11/12/13; saw Dr. Monreal follow up six months   8/5/14; Brain MRI, nodule superior aspect of sagittal sinus 1.1 x 1.4 cm (notes previously 1.3 x 0.9 cm)  8/5/14; pt saw Dr. Monreal, retired as cognitive issues after second surgery making work difficult, occasional headaches. Per MRI that day Dr. Monreal noted stable, no recurrence at this time   Continue to see enhancement of the calvarium unsure if meningioma or her Paget's disease.  Felt could go two years before next MRI    8/13/17; Brain MRI, solid nodular presumed meningioma posterior aspect of mid third superior sagittal sinus 1.9 x 1.4 x 1.8 cm                                  0.5 x 0.8 cm high right frontal parafalcine meningioma  10/28/17; pt saw Dr. Perez, wants pt to see neurosurgery again he recommended Dr. Sauer (pt thought Dr. Monreal too young)  11/6/17; referred to Dr. Sauer by Dr. Perez after a repeat MRI was done.  Pt reports dizziness which she states has  been going on for awhile.  Pt has retired since last resection as some disability involving her memory.  Notes mild increase in size of anterior and posterior falcine meningioma.  Treatment options presented, decision to watch, return one year.    11/4/18; Brain MRI, slight increase in solid nodular presumed meningioma posterior aspect of  mid third superior sagittal sinus 2.2 x 1.5 x 2.0 cm                                   Stable high right frontal parafalcine meningioma 0.5 x 0.8 cm                                    Stable postsurgical changes left parietal                                   No new foci  11/5/18; pt saw Dr. Sauer, pt feels dizziness stable, memory somewhat worse, headaches transient, not severe.  Right frontal parafalcine meningioma stable, posterior falcine meningioma slightly increased in size.  Discussed options again, will watchful wait.    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 Sauer, growth of residual meningioma in sagittal sinus and the smaller one along the falx.  Pt off seizure meds and no further seizures, some headaches.  Discussed options again and feels time to treat, Gamma Knife favored for the two lesions.  Referral to Rad/Onc     Chief Complaint: pt notes numb headaches, like had prior to the two craniotomies.  Pt takes tylenol which relieves the headaches, but she doesn't like taking pain meds.  Pt gets nauseated off and on but no emesis.  Short term memory issues since the second surgery so had to retire  Review of Systems   Constitutional:  Negative for fever, malaise/fatigue and weight loss.   HENT:  Negative for hearing loss.    Eyes:  Negative for blurred vision, double vision and photophobia.   Respiratory:  Negative for cough and shortness of breath.    Cardiovascular:  Negative for chest pain and leg swelling.   Gastrointestinal:  Positive for nausea. Negative for blood in stool, constipation, diarrhea and vomiting.   Genitourinary: Negative.    Musculoskeletal:         Joint aching,  feels some arthritis   Neurological:  Positive for seizures and headaches.   Psychiatric/Behavioral:  Negative for depression.                  Again, thank you for allowing me to participate in the care of your patient.        Sincerely,        Yousuf Garg MD

## 2023-12-12 NOTE — PROGRESS NOTES
HPI    Date: 2023   Age: 68 year old  Ethnicity:  Armenian   Sex: female  : 1955   Lives In: [place], [place]      Diagnosis: Meningiomas     Prior radiation therapy:   none    Prior chemotherapy:   none    RN time with patient: 55 minutes in person  Educated on Gamma Knife: yes    Doctors:  Dr. Narinder Sauer, Dr. Thierry Lobo-neurologist,  Dr. Angela Lacey-primary    Pain at time of consult: pt pain left foot, thinks arthritis, no other pain  Does pt have a living will: pt thinks she has  Is patient pregnant: Age 68  Does pt have implanted cardiac device: pt has no implanted cardiac device    Has pt fallen in past week: pt has not fallen  Does pt feel steady on her feet: pt feels steady on her feet     Review Since Diagnosis:  Pt presented with nausea and vomiting  ; resection of a falcine meningioma by Dr. Mcgregor, pt notes not a total resection due to location  11; Brain MRI, seen again nodular focus of meningioma deep to the left crani flap, present 10/30/09, measures 1.5 x 1.1 cm, increased slightly from .    2012; episode of trembling of right arm, found two small meningiomas in the where had previous resection  12; resection of recurrent falcine meningioma by Dr. Hickey at [facility] in [place].  Inferior and superior left parietal-occipital brain tumors showed Meningioma Grade I  Per pt Dr. Hickey wanted pt to have a referral to Cyber Knife as that would be next option if recurrence, pt did not like the idea of radiation, so sought second opinion   13; Brain MRI, development of rim enhancement around operative bed, favor post op changes.  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 Monreal for second opinion regarding the recurrent meningioma resected in 2012.  Reviewed films, does not feel needs any type of radiation at this point  10/14/13; Brain MRI, 0.9 cm nodular enhancement along the bed  of the superior sagittal sinus in anterior parietal region                                    0.7 cm meningioma along the right side of the mid frontal falx   11/12/13; saw Dr. Monreal follow up six months   8/5/14; Brain MRI, nodule superior aspect of sagittal sinus 1.1 x 1.4 cm (notes previously 1.3 x 0.9 cm)  8/5/14; pt saw Dr. Monreal, retired as cognitive issues after second surgery making work difficult, occasional headaches. Per MRI that day Dr. Monreal noted stable, no recurrence at this time   Continue to see enhancement of the calvarium unsure if meningioma or her Paget's disease.  Felt could go two years before next MRI    8/13/17; Brain MRI, solid nodular presumed meningioma posterior aspect of mid third superior sagittal sinus 1.9 x 1.4 x 1.8 cm                                  0.5 x 0.8 cm high right frontal parafalcine meningioma  10/28/17; pt saw Dr. Perez, wants pt to see neurosurgery again he recommended Dr. Sauer (pt thought Dr. Monreal too young)  11/6/17; referred to Dr. Sauer by Dr. Perez after a repeat MRI was done.  Pt reports dizziness which she states has  been going on for awhile.  Pt has retired since last resection as some disability involving her memory.  Notes mild increase in size of anterior and posterior falcine meningioma.  Treatment options presented, decision to watch, return one year.    11/4/18; Brain MRI, slight increase in solid nodular presumed meningioma posterior aspect of mid third superior sagittal sinus 2.2 x 1.5 x 2.0 cm                                   Stable high right frontal parafalcine meningioma 0.5 x 0.8 cm                                    Stable postsurgical changes left parietal                                   No new foci  11/5/18; pt saw Dr. Sauer, pt feels dizziness stable, memory somewhat worse, headaches transient, not severe.  Right frontal parafalcine meningioma stable, posterior falcine meningioma slightly increased in size.  Discussed options  again, will watchful wait.    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 Sauer, growth of residual meningioma in sagittal sinus and the smaller one along the falx.  Pt off seizure meds and no further seizures, some headaches.  Discussed options again and feels time to treat, Gamma Knife favored for the two lesions.  Referral to Rad/Onc     Chief Complaint: pt notes numb headaches, like had prior to the two craniotomies.  Pt takes tylenol which relieves the headaches, but she doesn't like taking pain meds.  Pt gets nauseated off and on but no emesis.  Short term memory issues since the second surgery so had to retire  Review of Systems   Constitutional:  Negative for fever, malaise/fatigue and weight loss.   HENT:  Negative for hearing loss.    Eyes:  Negative for blurred vision, double vision and photophobia.   Respiratory:  Negative for cough and shortness of breath.    Cardiovascular:  Negative for chest pain and leg swelling.   Gastrointestinal:  Positive for nausea. Negative for blood in stool, constipation, diarrhea and vomiting.   Genitourinary: Negative.    Musculoskeletal:         Joint aching, feels some arthritis   Neurological:  Positive for seizures and headaches.   Psychiatric/Behavioral:  Negative for depression.

## 2023-12-12 NOTE — NURSING NOTE
Pt Gamma Knife consult today with Dr. Yousuf Garg, standing order for lab work pre Gamma Knife was to draw electrolytes on patient.  The K+ came back 2.9.  10/11/23 the K+ was 3.1, 11/8/23 K+ was 3.5.  Suzan stated that her primary care doctor has been watching the K+ and said she should increase her intake of foods with K+.  Dr. Garg made aware of the K+ result from 12/12/23 and informed patient to increase K+ food intake and Suzan will call her primary care doctor tomorrow 12/13/23 to discuss the K+ results to see if she needs to go on a K+ replacement..

## 2023-12-13 ENCOUNTER — TELEPHONE (OUTPATIENT)
Dept: FAMILY MEDICINE | Facility: CLINIC | Age: 68
End: 2023-12-13
Payer: MEDICARE

## 2023-12-13 NOTE — TELEPHONE ENCOUNTER
Contacted patient who explained she had her pre-op yesterday and was told her K+ is low and to contact her PCP.  She does not have transportation today, would like to come tomorrow at 10 am to be seen in [facility].

## 2023-12-13 NOTE — TELEPHONE ENCOUNTER
Called to see if patient is symptomatic    Yes, lets see if we can get her into [facility].  If we cannot, please let me know and I will send in supplementation.    More importantly, what is causing her potassium to be low could.  Did she change her diuretic dose?  Is she starting on any new medications?  Is she been ill or having diarrhea?

## 2023-12-13 NOTE — TELEPHONE ENCOUNTER
Reason for Call:  Medication     Do you use a [facility]? Yes, [facility]   Name of the pharmacy and phone number for the current request:  [facility]    Name of the medication requested: potassium     Other request: Patient states she was told to reach out to Dr. Lacey has her potassium levels are low.     Can we leave a detailed message on this number? YES    Phone number patient can be reached at: Home number on file [phone] (home)    Best Time: any    Call taken on 12/13/2023 at 10:02 AM by Susy Pathak

## 2023-12-13 NOTE — TELEPHONE ENCOUNTER
Called patient to see if she has transportation to go to a pharmacy today since she is asymptomatic could do oral potassium supplements instead of [facility]. Patient does not have a ride to pharmacy, and would like to wait for [facility] appointment tomorrow since she feels ok. Will update PCP.

## 2023-12-14 ENCOUNTER — OFFICE VISIT (OUTPATIENT)
Dept: PEDIATRICS | Facility: CLINIC | Age: 68
End: 2023-12-14
Payer: MEDICARE

## 2023-12-14 VITALS
TEMPERATURE: 98 F | SYSTOLIC BLOOD PRESSURE: 149 MMHG | DIASTOLIC BLOOD PRESSURE: 81 MMHG | OXYGEN SATURATION: 98 % | HEART RATE: 69 BPM | RESPIRATION RATE: 16 BRPM

## 2023-12-14 DIAGNOSIS — R25.2 MUSCLE CRAMPS: ICD-10-CM

## 2023-12-14 DIAGNOSIS — E87.6 HYPOKALEMIA: Primary | ICD-10-CM

## 2023-12-14 PROCEDURE — 99214 OFFICE O/P EST MOD 30 MIN: CPT | Mod: 25 | Performed by: FAMILY MEDICINE

## 2023-12-14 PROCEDURE — 96365 THER/PROPH/DIAG IV INF INIT: CPT | Performed by: FAMILY MEDICINE

## 2023-12-14 RX ORDER — POTASSIUM CHLORIDE 7.45 MG/ML
10 INJECTION INTRAVENOUS ONCE
Status: COMPLETED | OUTPATIENT
Start: 2023-12-14 | End: 2023-12-14

## 2023-12-14 RX ORDER — POTASSIUM CHLORIDE 1500 MG/1
20 TABLET, EXTENDED RELEASE ORAL DAILY
Qty: 3 TABLET | Refills: 0 | Status: SHIPPED | OUTPATIENT
Start: 2023-12-14 | End: 2023-12-17

## 2023-12-14 RX ADMIN — POTASSIUM CHLORIDE 10 MEQ: 7.45 INJECTION INTRAVENOUS at 11:30

## 2023-12-14 ASSESSMENT — PAIN SCALES - GENERAL: PAINLEVEL: NO PAIN (0)

## 2023-12-14 NOTE — PROGRESS NOTES
"  Assessment & Plan     Hypokalemia  Potassium chloride 10 mEq in the 100 mL of sterile water infused today.  Patient tolerated infusion well and just felt a little bit of burning a couple of times.    Plan:  Go to your nearest [facility] lab on Monday, December 18 and have a blood draw done to recheck your potassium.    Take the potassium chloride 20 mEq tablet once daily for 3 days    Eat foods that are high in potassium.  Foods with high levels of potassium listed below:  Highest content (>25 mEq/100 g)   Dried figs   Molasses   Seaweed   Very high content (>12.5 mEq/100 g)   Dried fruits (dates, prunes)   Nuts   Avocados   Bran cereals   Wheat germ   Lima beans    High content (>6.2 mEq/100 g)   Vegetables   Spinach   Tomatoes   Broccoli   Winter squash   Beets   Carrots   Cauliflower   Potatoes   Fruits   Bananas   Cantaloupe   Kiwis   Oranges   Mangos   Meats   Ground beef   Steak   Pork   Veal   Lamb      Adapted from: Jerri LANCASTER. Hypokalemia. N Engl J Med 1998; 339:451.  Graphic 37746 Version 5.0  - Basic metabolic panel; Future  - potassium chloride 10 mEq in 100 mL sterile water infusion  - potassium chloride ER (KLOR-CON M) 20 MEQ CR tablet; Take 1 tablet (20 mEq) by mouth daily for 3 days  - Basic metabolic panel    Muscle cramps  Intermittent  - Basic metabolic panel; Future  - potassium chloride 10 mEq in 100 mL sterile water infusion  - potassium chloride ER (KLOR-CON M) 20 MEQ CR tablet; Take 1 tablet (20 mEq) by mouth daily for 3 days  - Basic metabolic panel             BMI:   Estimated body mass index is 28.28 kg/m  as calculated from the following:    Height as of 12/12/23: 1.499 m (4' 11\").    Weight as of 12/12/23: 63.5 kg (140 lb).           No follow-ups on file.    Charis Cody MD  [facility] MATHEUS Mares is a 68 year old, presenting for the following health issues:  Hypokalemia      HPI     Concern - Hypokalemia  Onset: Noted on preoperative basic " metabolic panel done 2 days ago  Description: She does have a little bit of muscle cramping and she feels tired.  She occasionally gets heart palpitations.  Intensity: mild  Progression of Symptoms:  intermittent  Accompanying Signs & Symptoms: None  Previous history of similar problem: Looking through her chart 2 months ago she was very mildly low on potassium and then 1 month ago her potassium was normal  Precipitating factors:        Worsened by: Unknown  Alleviating factors:        Improved by: Unknown  Therapies tried and outcome: None    Patient is a 68-year-old female who is referred to the [facility] for IV potassium replacement.  Her potassium level was noted to be 2.9 on basic metabolic panel that was drawn for a preoperative visit.  She is having surgery on December 19.  She is undergoing a gamma knife surgery for a meningioma.    Chart review shows no clear reason for her hypokalemia as far as current medications.    Review of Systems   Negative except as listed in HPI      Objective    BP (!) 149/81 (BP Location: Right arm, Patient Position: Sitting, Cuff Size: Adult Regular)   Pulse 69   Temp 98  F (36.7  C) (Oral)   Resp 16   LMP  (LMP Unknown)   SpO2 98%   There is no height or weight on file to calculate BMI.  Physical Exam   Vitals are noted and within normal limits except for mildly elevated systolic blood pressure  In general she is alert, oriented, and in no acute distress  Heart regular rate and rhythm with no murmurs  Lungs clear to auscultation bilaterally with good air entry

## 2023-12-14 NOTE — PATIENT INSTRUCTIONS
Go to your nearest [facility] lab on Monday, December 18 and have a blood draw done to recheck your potassium.    Take the potassium chloride tablet once daily for 3 days    Eat foods that are high in potassium.  Foods with high levels of potassium listed below:  Highest content (>25 mEq/100 g)   Dried figs   Molasses   Seaweed   Very high content (>12.5 mEq/100 g)   Dried fruits (dates, prunes)   Nuts   Avocados   Bran cereals   Wheat germ   Lima beans    High content (>6.2 mEq/100 g)   Vegetables   Spinach   Tomatoes   Broccoli   Winter squash   Beets   Carrots   Cauliflower   Potatoes   Fruits   Bananas   Cantaloupe   Kiwis   Oranges   Mangos   Meats   Ground beef   Steak   Pork   Veal   Lamb      Adapted from: Jerri LANCASTER. Hypokalemia. N Engl J Med 1998; 339:451.  Graphic 20516 Version 5.0

## 2023-12-18 ENCOUNTER — LAB (OUTPATIENT)
Dept: LAB | Facility: CLINIC | Age: 68
End: 2023-12-18
Payer: MEDICARE

## 2023-12-18 DIAGNOSIS — R25.2 MUSCLE CRAMPS: ICD-10-CM

## 2023-12-18 DIAGNOSIS — E87.6 HYPOKALEMIA: ICD-10-CM

## 2023-12-18 LAB
ANION GAP SERPL CALCULATED.3IONS-SCNC: 2 MMOL/L (ref 3–14)
BUN SERPL-MCNC: 16 MG/DL (ref 7–30)
CALCIUM SERPL-MCNC: 9.4 MG/DL (ref 8.5–10.1)
CHLORIDE BLD-SCNC: 107 MMOL/L (ref 94–109)
CO2 SERPL-SCNC: 31 MMOL/L (ref 20–32)
CREAT SERPL-MCNC: 0.6 MG/DL (ref 0.52–1.04)
EGFRCR SERPLBLD CKD-EPI 2021: >90 ML/MIN/1.73M2
GLUCOSE BLD-MCNC: 153 MG/DL (ref 70–99)
POTASSIUM BLD-SCNC: 3.8 MMOL/L (ref 3.4–5.3)
SODIUM SERPL-SCNC: 140 MMOL/L (ref 133–144)

## 2023-12-18 PROCEDURE — 36415 COLL VENOUS BLD VENIPUNCTURE: CPT

## 2023-12-18 PROCEDURE — 80048 BASIC METABOLIC PNL TOTAL CA: CPT

## 2023-12-19 ENCOUNTER — OFFICE VISIT (OUTPATIENT)
Dept: RADIATION ONCOLOGY | Facility: CLINIC | Age: 68
End: 2023-12-19
Attending: RADIOLOGY
Payer: MEDICARE

## 2023-12-19 ENCOUNTER — HOSPITAL ENCOUNTER (OUTPATIENT)
Dept: MRI IMAGING | Facility: CLINIC | Age: 68
Discharge: HOME | End: 2023-12-19
Attending: RADIOLOGY
Payer: MEDICARE

## 2023-12-19 VITALS
SYSTOLIC BLOOD PRESSURE: 142 MMHG | RESPIRATION RATE: 16 BRPM | DIASTOLIC BLOOD PRESSURE: 76 MMHG | OXYGEN SATURATION: 97 % | HEART RATE: 69 BPM

## 2023-12-19 VITALS
DIASTOLIC BLOOD PRESSURE: 76 MMHG | HEART RATE: 63 BPM | SYSTOLIC BLOOD PRESSURE: 132 MMHG | RESPIRATION RATE: 16 BRPM | OXYGEN SATURATION: 97 %

## 2023-12-19 DIAGNOSIS — D32.9 MENINGIOMA (H): Primary | ICD-10-CM

## 2023-12-19 DIAGNOSIS — D32.9 MENINGIOMA (H): ICD-10-CM

## 2023-12-19 PROCEDURE — 250N000013 HC RX MED GY IP 250 OP 250 PS 637: Performed by: RADIOLOGY

## 2023-12-19 PROCEDURE — 77263 THER RADIOLOGY TX PLNG CPLX: CPT | Performed by: RADIOLOGY

## 2023-12-19 PROCEDURE — 250N000011 HC RX IP 250 OP 636: Mod: JZ | Performed by: RADIOLOGY

## 2023-12-19 PROCEDURE — 77295 3-D RADIOTHERAPY PLAN: CPT | Mod: 26 | Performed by: RADIOLOGY

## 2023-12-19 PROCEDURE — 77334 RADIATION TREATMENT AID(S): CPT | Mod: 26 | Performed by: RADIOLOGY

## 2023-12-19 PROCEDURE — 70552 MRI BRAIN STEM W/DYE: CPT | Mod: MG

## 2023-12-19 PROCEDURE — 77334 RADIATION TREATMENT AID(S): CPT | Performed by: RADIOLOGY

## 2023-12-19 PROCEDURE — 77432 STEREOTACTIC RADIATION TRMT: CPT | Performed by: RADIOLOGY

## 2023-12-19 PROCEDURE — 77370 RADIATION PHYSICS CONSULT: CPT | Performed by: RADIOLOGY

## 2023-12-19 PROCEDURE — 77300 RADIATION THERAPY DOSE PLAN: CPT | Mod: 26 | Performed by: RADIOLOGY

## 2023-12-19 PROCEDURE — 70552 MRI BRAIN STEM W/DYE: CPT | Mod: 26 | Performed by: RADIOLOGY

## 2023-12-19 PROCEDURE — G1010 CDSM STANSON: HCPCS | Mod: GC | Performed by: RADIOLOGY

## 2023-12-19 PROCEDURE — 250N000009 HC RX 250: Performed by: RADIOLOGY

## 2023-12-19 PROCEDURE — 77300 RADIATION THERAPY DOSE PLAN: CPT | Performed by: RADIOLOGY

## 2023-12-19 PROCEDURE — 255N000002 HC RX 255 OP 636: Mod: JZ | Performed by: RADIOLOGY

## 2023-12-19 PROCEDURE — G1010 CDSM STANSON: HCPCS

## 2023-12-19 PROCEDURE — A9585 GADOBUTROL INJECTION: HCPCS | Mod: JZ | Performed by: RADIOLOGY

## 2023-12-19 PROCEDURE — 77295 3-D RADIOTHERAPY PLAN: CPT | Performed by: RADIOLOGY

## 2023-12-19 PROCEDURE — 77371 SRS MULTISOURCE: CPT | Performed by: RADIOLOGY

## 2023-12-19 RX ORDER — ACETAMINOPHEN 325 MG/1
650 TABLET ORAL EVERY 4 HOURS PRN
Status: DISCONTINUED | OUTPATIENT
Start: 2023-12-19 | End: 2023-12-19 | Stop reason: HOSPADM

## 2023-12-19 RX ORDER — LORAZEPAM 0.5 MG/1
.5-1 TABLET ORAL
Status: COMPLETED | OUTPATIENT
Start: 2023-12-19 | End: 2023-12-19

## 2023-12-19 RX ORDER — LIDOCAINE 40 MG/G
1 CREAM TOPICAL SEE ADMIN INSTRUCTIONS
Status: COMPLETED | OUTPATIENT
Start: 2023-12-19 | End: 2023-12-19

## 2023-12-19 RX ORDER — LORAZEPAM 0.5 MG/1
.5-1 TABLET ORAL
Status: DISCONTINUED | OUTPATIENT
Start: 2023-12-19 | End: 2023-12-19 | Stop reason: HOSPADM

## 2023-12-19 RX ORDER — GADOBUTROL 604.72 MG/ML
7.5 INJECTION INTRAVENOUS ONCE
Status: COMPLETED | OUTPATIENT
Start: 2023-12-19 | End: 2023-12-19

## 2023-12-19 RX ORDER — LORAZEPAM 0.5 MG/1
.5-2 TABLET ORAL
Status: COMPLETED | OUTPATIENT
Start: 2023-12-19 | End: 2023-12-19

## 2023-12-19 RX ORDER — ONDANSETRON 2 MG/ML
4 INJECTION INTRAMUSCULAR; INTRAVENOUS
Status: DISCONTINUED | OUTPATIENT
Start: 2023-12-19 | End: 2023-12-19 | Stop reason: HOSPADM

## 2023-12-19 RX ORDER — HYDROCODONE BITARTRATE AND ACETAMINOPHEN 5; 325 MG/1; MG/1
1-2 TABLET ORAL EVERY 6 HOURS PRN
Status: DISCONTINUED | OUTPATIENT
Start: 2023-12-19 | End: 2023-12-19 | Stop reason: HOSPADM

## 2023-12-19 RX ORDER — ONDANSETRON 4 MG/1
8 TABLET, ORALLY DISINTEGRATING ORAL EVERY 6 HOURS PRN
Status: DISCONTINUED | OUTPATIENT
Start: 2023-12-19 | End: 2023-12-19 | Stop reason: HOSPADM

## 2023-12-19 RX ADMIN — LORAZEPAM 1 MG: 0.5 TABLET ORAL at 05:27

## 2023-12-19 RX ADMIN — ACETAMINOPHEN 650 MG: 325 TABLET, FILM COATED ORAL at 08:55

## 2023-12-19 RX ADMIN — GADOBUTROL 6 ML: 604.72 INJECTION INTRAVENOUS at 07:38

## 2023-12-19 RX ADMIN — LIDOCAINE HYDROCHLORIDE,EPINEPHRINE BITARTRATE 30 ML: 20; .01 INJECTION, SOLUTION INFILTRATION; PERINEURAL at 05:27

## 2023-12-19 RX ADMIN — LORAZEPAM 1 MG: 0.5 TABLET ORAL at 08:55

## 2023-12-19 RX ADMIN — LIDOCAINE 1 G: 40 CREAM TOPICAL at 05:27

## 2023-12-19 ASSESSMENT — PAIN SCALES - GENERAL: PAINLEVEL: NO PAIN (0)

## 2023-12-19 NOTE — LETTER
2023         RE: Suzan Ballard  [address]  [facility] [address]        Dear Colleague,    Thank you for referring your patient, Suzan Ballard, to the [facility] RADIATION ONCOLOGY GAMMA KNIFE. Please see a copy of my visit note below.      Name: Suzan Ballard.  : 1955 Medical Record #: [id number]  Diagnosis: C71.8 Malignant neoplasm of overlapping sites of brain  Date of Treatment: 2023  Referring Physicians: , Tumor Registry      GAMMA KNIFE PROCEDURE NOTE and TREATMENT SUMMARY    Treatment Summary:     Treatment Site Dose Modality Collimators Shots   Anterior Falx 13 Gy to 50% isodose Cobalt 60 4,8,16mm 12   Posterior Sinus 13 Gy to 55% isodose Cobalt 60 4,8,16mm 50             DESCRIPTION OF PROCEDURE:  On 2023, the patient was brought to the Gamma Knife suite at the [facility].      HEAD IMMOBILIZATION: Head frame put on by the neurosurgeon  MEDICATION: Sedation and local anesthetic    The patient was then taken to the Department of Radiology where a stereotactic brain MRI was performed.  The patient was admitted to the  care area while treatment planning was completed.      These Images were then sent to the [employer] Gamma Knife computer system where a three dimensional stereotactic plan was generated.   The patient was then treated on the [employer] Gamma Knife.  Treatment involved 2 targets.    The [employer] Gamma Knife IconTM Plan software was used to create a highly conformal dose distribution using the number and size collimators detailed above.     TREATMENT:    The patient was brought to the Gamma Knife suite.  The patient was identified by 2 methods. A timeout was performed to confirm the correct patient and correct procedure. The site was identified by an MRI image and treatment planning software, which defined the treatment area.     A cone beam CT was done and compared to the MRI to look for frame motion  We  evaluated the frame manually to ensure it was still secure.     The treatment was delivered using the [employer] Gamma Knife IconTM without complication.  The head immobilization was removed and the patient was discharged home in stable condition.  The patient tolerated the treatment well and had no complications.        PAIN MANAGEMENT: None required      FOLLOW-UP PLANS:  Follow up: 3 Months      Imaging Before Follow Up: Brain MRI    Approved by:  GREGG Garg MD    PHYSICIST: Phuong Acevedo, PhD      Again, thank you for allowing me to participate in the care of your patient.        Sincerely,        Yousuf Garg MD

## 2023-12-19 NOTE — LETTER
12/19/2023         RE: Suzan Ballard  [address] [place] [address]        Dear Colleague,    Thank you for referring your patient, Suzan Ballard, to the [facility] RADIATION ONCOLOGY GAMMA KNIFE. Please see a copy of my visit note below.    PREOPERATIVE DIAGNOSIS:  Meningiomas       POSTOPERATIVE DIAGNOSIS:  Meningiomas      PROCEDURES:   1.  Placement of Leksell stereotactic head frame.   2.  Gamma knife treatment.       ATTENDING SURGEON:  Narinder Sauer MD       ANESTHESIA:  Local anesthetic.       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  She was brought for radiosurgical treatment of the lesion along with a small additional meningioma.      DESCRIPTION OF PROCEDURE:  After obtaining informed consent, the patient was brought to the Gamma Knife suite and correctly identified.  Oral Ativan was administered for head frame placement.  Leksell stereotactic head frame was then secured to the patient's head using 4 pins after the intended pin sites were prepared in the usual sterile fashion and infiltrated with local anesthetic.        The patient was then transferred to the MRI scanner and a stereotactic MRI of the brain was obtained.  The images were transferred to the Gamma Knife treatment planning station and 3-dimensional stereotactic treatment plan was generated as follows:     2 targets were identified 1 along the anterior falx and 1 in the sagittal sinus.  Maximum diameter of the larger lesion in the sagittal sinus was 2.8 cm.    The patient was placed on the Gamma Knife couch and treatment carried out as follows:           At the completion of treatment, the patient was taken out of the treatment unit, the stereotactic head frame removed, the pin sites were inspected for integrity.  A sterile dressing was applied and the patient was given discharge instructions and a  plan for followup.  No immediate complications were noted.       Dr. Sauer was present for the key neurosurgical portions of the procedure and immediately available throughout the entire procedure.       Again, thank you for allowing me to participate in the care of your patient.        Sincerely,        Narinder Sauer MD

## 2023-12-19 NOTE — PROGRESS NOTES
Name: Suzan Ballard  : 1955 Medical Record #: [id number]  Diagnosis: C71.8 Malignant neoplasm of overlapping sites of brain  Date of Treatment: 2023  Referring Physicians: , Tumor Registry      GAMMA KNIFE PROCEDURE NOTE and TREATMENT SUMMARY    Treatment Summary:     Treatment Site Dose Modality Collimators Shots   Anterior Falx 13 Gy to 50% isodose Cobalt 60 4,8,16mm 12   Posterior Sinus 13 Gy to 55% isodose Cobalt 60 4,8,16mm 50             DESCRIPTION OF PROCEDURE:  On 2023, the patient was brought to the Gamma Knife suite at the [facility].      HEAD IMMOBILIZATION: Head frame put on by the neurosurgeon  MEDICATION: Sedation and local anesthetic    The patient was then taken to the Department of Radiology where a stereotactic brain MRI was performed.  The patient was admitted to the  care area while treatment planning was completed.      These Images were then sent to the [employer] Gamma Knife computer system where a three dimensional stereotactic plan was generated.   The patient was then treated on the Leksell Gamma Knife.  Treatment involved 2 targets.    The [employer] Gamma Knife IconTM Plan software was used to create a highly conformal dose distribution using the number and size collimators detailed above.     TREATMENT:    The patient was brought to the Gamma Knife suite.  The patient was identified by 2 methods. A timeout was performed to confirm the correct patient and correct procedure. The site was identified by an MRI image and treatment planning software, which defined the treatment area.     A cone beam CT was done and compared to the MRI to look for frame motion  We evaluated the frame manually to ensure it was still secure.     The treatment was delivered using the [employer] Gamma Knife IconTM without complication.  The head immobilization was removed and the patient was discharged home in stable condition.  The patient  tolerated the treatment well and had no complications.        PAIN MANAGEMENT: None required      FOLLOW-UP PLANS:  Follow up: 3 Months      Imaging Before Follow Up: Brain MRI    Approved by:  GREGG Garg MD    PHYSICIST: Phuong Acevedo, PhD

## 2023-12-19 NOTE — PROGRESS NOTES
PREOPERATIVE DIAGNOSIS:  Meningiomas       POSTOPERATIVE DIAGNOSIS:  Meningiomas      PROCEDURES:   1.  Placement of Leksell stereotactic head frame.   2.  Gamma knife treatment.       ATTENDING SURGEON:  Narinder Sauer MD       ANESTHESIA:  Local anesthetic.       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  She was brought for radiosurgical treatment of the lesion along with a small additional meningioma.      DESCRIPTION OF PROCEDURE:  After obtaining informed consent, the patient was brought to the Gamma Knife suite and correctly identified.  Oral Ativan was administered for head frame placement.  Leksell stereotactic head frame was then secured to the patient's head using 4 pins after the intended pin sites were prepared in the usual sterile fashion and infiltrated with local anesthetic.        The patient was then transferred to the MRI scanner and a stereotactic MRI of the brain was obtained.  The images were transferred to the Gamma Knife treatment planning station and 3-dimensional stereotactic treatment plan was generated as follows:     2 targets were identified 1 along the anterior falx and 1 in the sagittal sinus.  Maximum diameter of the larger lesion in the sagittal sinus was 2.8 cm.    The patient was placed on the Gamma Knife couch and treatment carried out as follows:           At the completion of treatment, the patient was taken out of the treatment unit, the stereotactic head frame removed, the pin sites were inspected for integrity.  A sterile dressing was applied and the patient was given discharge instructions and a plan for followup.  No immediate complications were noted.       Dr. Sauer was present for the key neurosurgical portions of the procedure and immediately available throughout the entire procedure.

## 2023-12-20 ENCOUNTER — TELEPHONE (OUTPATIENT)
Dept: RADIATION ONCOLOGY | Facility: CLINIC | Age: 68
End: 2023-12-20
Payer: MEDICARE

## 2023-12-20 ENCOUNTER — TELEPHONE (OUTPATIENT)
Dept: NEUROSURGERY | Facility: CLINIC | Age: 68
End: 2023-12-20
Payer: MEDICARE

## 2023-12-20 NOTE — TELEPHONE ENCOUNTER
A message was left for patient to call and schedule a one year follow up with Dr. Sauer for December 2024.  Patient will also need an MRI done prior to this appointment as well.    This appointment is a follow up from completing Gamma Knife treatments.

## 2023-12-20 NOTE — TELEPHONE ENCOUNTER
Follow-up phone call made to the patient to check status post gamma knife on 12/19/23. Left a voicemail for the patient to please call in and let us know how she is doing.

## 2023-12-21 ENCOUNTER — HOSPITAL ENCOUNTER (OUTPATIENT)
Facility: AMBULATORY SURGERY CENTER | Age: 68
End: 2023-12-21
Attending: FAMILY MEDICINE
Payer: MEDICARE

## 2023-12-21 ENCOUNTER — TELEPHONE (OUTPATIENT)
Dept: RADIATION ONCOLOGY | Facility: CLINIC | Age: 68
End: 2023-12-21
Payer: MEDICARE

## 2023-12-21 ENCOUNTER — TELEPHONE (OUTPATIENT)
Dept: GASTROENTEROLOGY | Facility: CLINIC | Age: 68
End: 2023-12-21
Payer: MEDICARE

## 2023-12-21 NOTE — TELEPHONE ENCOUNTER
"Pre Assessment RN Review    Focused Assessments      ANSHU Hx  Estimated body mass index is 28.28 kg/m  as calculated from the following:    Height as of 12/12/23: 1.499 m (4' 11\").    Weight as of 12/12/23: 63.5 kg (140 lb).     Patient has reported / documented history ANSHU and reports she does use a a device for sleep.     Device: CPAP    Severity Assessment    Sleep Study:   Diagnosis/Severity: Mild    AHI: 2.1          Scheduling Status & Recommendations    Location Type: No Scheduling Restrictions - Per RN assessment.    Stephany Sargent RN  Endoscopy Procedure Pre Assessment RN  [phone] option 4    "

## 2023-12-21 NOTE — TELEPHONE ENCOUNTER
"Endoscopy Scheduling Screen    Have you had a positive Covid test in the last 14 days?  No    Are you active on MyChart?   Yes    What insurance is in the chart?  Other:  MEDICARE    Ordering/Referring Provider: WILLIAM HAIDER   (If ordering provider performs procedure, schedule with ordering provider unless otherwise instructed. )    BMI: Estimated body mass index is 28.28 kg/m  as calculated from the following:    Height as of 12/12/23: 1.499 m (4' 11\").    Weight as of 12/12/23: 63.5 kg (140 lb).     Sedation Ordered  moderate sedation.   If patient BMI > 50 do not schedule in [facility].    If patient BMI > 45 do not schedule at [facility].    Are you taking methadone or Suboxone?  No    Are you taking any prescription medications for pain 3 or more times per week?   NO - No RN review required.    Do you have a history of malignant hyperthermia or adverse reaction to anesthesia?  No    (Females) Are you currently pregnant?   No     Have you been diagnosed or told you have pulmonary hypertension?   No    Do you have an LVAD?  No    Have you been told you have moderate to severe sleep apnea?  Yes (RN Review required for scheduling unless scheduling in Hospital.)    Have you been told you have COPD, asthma, or any other lung disease?  No    Do you have any heart conditions?  No     Have you ever had an organ transplant?   No    Have you ever had or are you awaiting a heart or lung transplant?   No    Have you had a stroke or transient ischemic attack (TIA aka \"mini stroke\" in the last 6 months?   No    Have you been diagnosed with or been told you have cirrhosis of the liver?   No    Are you currently on dialysis?   No    Do you need assistance transferring?   No    BMI: Estimated body mass index is 28.28 kg/m  as calculated from the following:    Height as of 12/12/23: 1.499 m (4' 11\").    Weight as of 12/12/23: 63.5 kg (140 lb).     Is patients BMI > 40 and scheduling location [facility]?  No    Do you take an injectable " medication for weight loss or diabetes (excluding insulin)?  No    Do you take the medication Naltrexone?  No    Do you take blood thinners?  No       Prep   Are you currently on dialysis or do you have chronic kidney disease?  No    Do you have a diagnosis of diabetes?  No    Do you have a diagnosis of cystic fibrosis (CF)?  No    On a regular basis do you go 3 -5 days between bowel movements?  No    BMI > 40?  No    Preferred Pharmacy:    [place] [id number] [facility]  [address]  [facility] [address]  Phone: [phone] Fax: [phone]      Final Scheduling Details   Colonoscopy prep sent?  Standard MiraLAX    Procedure scheduled  Colonoscopy    Surgeon:  GALA     Date of procedure:  5/9/24     Pre-OP / PAC:   No - Not required for this site.    Location  [facility] - Per order.    Sedation   MAC/Deep Sedation  Per location.      Patient Reminders:   You will receive a call from a Nurse to review instructions and health history.  This assessment must be completed prior to your procedure.  Failure to complete the Nurse assessment may result in the procedure being cancelled.      On the day of your procedure, please designate an adult(s) who can drive you home stay with you for the next 24 hours. The medicines used in the exam will make you sleepy. You will not be able to drive.      You cannot take public transportation, ride share services, or non-medical taxi service without a responsible caregiver.  Medical transport services are allowed with the requirement that a responsible caregiver will receive you at your destination.  We require that drivers and caregivers are confirmed prior to your procedure.

## 2023-12-21 NOTE — TELEPHONE ENCOUNTER
Call back from the patient post gamma knife procedure. Generally doing well. Head wrap was removed and patient showered without incidence. Pin sites look good. The left back pin site is sore. She is using a cool compress on the site and that helps. Mild headache. Taking Tylenol and that resolves the Tylenol. Feels fatigued. Discussed this is normal and to rest as needed. Patient verbalized understanding.

## 2024-02-01 ENCOUNTER — TELEPHONE (OUTPATIENT)
Dept: NEUROSURGERY | Facility: CLINIC | Age: 69
End: 2024-02-01
Payer: MEDICARE

## 2024-02-01 NOTE — TELEPHONE ENCOUNTER
A message was left for patient to call Imaging Scheduling at [phone] to schedule the follow up MRI for December 2024.      Patient was advised that imaging schedules are filling up quickly and would be best to schedule sooner than later.

## 2024-02-17 ENCOUNTER — HEALTH MAINTENANCE LETTER (OUTPATIENT)
Age: 69
End: 2024-02-17

## 2024-02-21 ENCOUNTER — OFFICE VISIT (OUTPATIENT)
Dept: VASCULAR SURGERY | Facility: CLINIC | Age: 69
End: 2024-02-21
Attending: HOSPITALIST
Payer: MEDICARE

## 2024-02-21 ENCOUNTER — ANCILLARY PROCEDURE (OUTPATIENT)
Dept: VASCULAR ULTRASOUND | Facility: CLINIC | Age: 69
End: 2024-02-21
Attending: HOSPITALIST
Payer: MEDICARE

## 2024-02-21 VITALS
HEART RATE: 69 BPM | DIASTOLIC BLOOD PRESSURE: 82 MMHG | BODY MASS INDEX: 28.63 KG/M2 | WEIGHT: 142 LBS | SYSTOLIC BLOOD PRESSURE: 145 MMHG | OXYGEN SATURATION: 98 % | RESPIRATION RATE: 12 BRPM | HEIGHT: 59 IN

## 2024-02-21 DIAGNOSIS — I83.90 VARICOSE VEINS OF CALF: ICD-10-CM

## 2024-02-21 DIAGNOSIS — I83.893 SYMPTOMATIC VARICOSE VEINS OF BOTH LOWER EXTREMITIES: ICD-10-CM

## 2024-02-21 DIAGNOSIS — I83.893 SYMPTOMATIC VARICOSE VEINS OF BOTH LOWER EXTREMITIES: Primary | ICD-10-CM

## 2024-02-21 PROCEDURE — 93970 EXTREMITY STUDY: CPT | Mod: 26 | Performed by: SURGERY

## 2024-02-21 PROCEDURE — 93970 EXTREMITY STUDY: CPT

## 2024-02-21 PROCEDURE — 99204 OFFICE O/P NEW MOD 45 MIN: CPT | Performed by: SPECIALIST

## 2024-02-21 PROCEDURE — G0463 HOSPITAL OUTPT CLINIC VISIT: HCPCS | Mod: 25 | Performed by: SPECIALIST

## 2024-02-21 RX ORDER — ACETAMINOPHEN 325 MG/1
975 TABLET ORAL ONCE
Status: CANCELLED | OUTPATIENT
Start: 2024-02-21 | End: 2024-02-21

## 2024-02-21 ASSESSMENT — PAIN SCALES - GENERAL: PAINLEVEL: SEVERE PAIN (6)

## 2024-02-21 NOTE — PATIENT INSTRUCTIONS
"      Surgery for Varicose Veins  If you have large varicose veins, surgery may be the best choice. But it will not prevent new varicose veins from forming. Surgery is most often done in a hospital or surgery center on an outpatient basis.  Varicose vein surgery    What is microphlebectomy?  Phlebectomy (say \"Karla\") is a procedure used to remove varicose veins. These are twisted and enlarged veins near the surface of the skin. The procedure is also called microphlebectomy, ambulatory phlebectomy or stab avulsion.    How is the procedure done?  The procedure is usually done in your doctor's office. You will get medicine to numb the area.  Your doctor will make several tiny cuts (incisions) in the skin. The varicose veins will be removed through the cuts.  You most likely will not need stitches to close the cuts.    What can you expect after the procedure?  Your doctor may wrap your leg in a bandage. You may also wear compression stockings. Your doctor will tell you how long to wear them.  You can go home the same day. You will probably be able to do your usual activities the next day. You may have a little bruising and numbness.  Follow-up care is a key part of your treatment and safety. Be sure to make and go to all appointments, and call your doctor if you are having problems. It's also a good idea to know your test results and keep a list of the medicines you take.    Current as of: December 19, 2022  Author: [facility] Staff  Medical Review:Dilia Wilson MD - Family Medicine & Nathen Vera MD - Family Medicine & Donell Dye MD - Vascular Surgery    Suzan,  Your visit to [facility] for your surgery or procedure is coming soon and we look forward to seeing you! This friendly reminder and pre-procedure checklist will help to ensure your procedure/surgery goes smoothly and meets your expectations. At [facility], our goal is to provide you with a great patient " experience and to deliver genuine, professional care to every patient.   Please complete all the steps in advance of your visit. If you have any questions about the items listed below, please give our office a call. We can be reached at [phone] or visit our website at [email or web address]  for more information.     Procedure: Phlebectomy of left leg  Surgeon: Dr. Thierry Lobo  Procedure Date :  TBD  Procedure Location: [facility]:  [address] [place] [phone] (Fax: [phone])  Procedure Time :  TBD  Arrival Time: TBD  Admission Type: Outpatient  Post Operative Appointment with Dr Thierry Lobo: 2 weeks after surgery    IF YOU NEED TO RESCHEDULE OR CANCEL YOUR PROCEDURE FOR ANY REASON PLEASE CALL THE CLINIC AS SOON AS POSSIBLE -[phone].    Complete the following checklist before your procedure/surgery    [] Contact your insurance regarding coverage. We will do a prior authorization, but please be aware this doesn't mean you are covered at 100%. If your insurance has changed please notify us.  Please check with your insurance for coverage and your out of pocket.  Stab phlebectomy outpatient procedure code: 92108  Stab phlebectomy in clinic procedure code: 70688  Vein ligation procedure code: 66108  If you would like a Good [facility] Estimate for your upcoming service/procedure contact Cost of Care Estimates at [phone], advocates are available Monday - Friday 8am - 5pm.  You may also submit a request online through your [employer] account.  If your procedure is at [facility] please contact the numbers below for Cost of Care Estimates.   - Facility Charge: [phone]    Anesthesiology charge:  [phone]    For all self-pay, estimate, or anesthesia billing questions at [facility], the contact information is below:  Who to contact: Lamar ZIEGLER  [facility] number: [phone]  Prepay  number: [phone]    [] Pre-Operative Physical   You will need a Pre-Op physical within 30 days of surgery date from your primary provider.     [] Pre-Operative Medication Instructions  Contact your prescribing provider for instructions before discontinuing any medications including anticoagulants. (Examples: Coumadin, Heparin, Xarelto, Eliquis, Plavix, Pradaxa, Effiient, Briliant) We would like you stop them 3-5 days before surgery, depending on the medicaion. HOWEVER, please follow the advice of your primary care provider. Most surgeons will have you remain on your aspirin.      Not Applicable    If you take these diabetic medications, please discuss with your primary doctor and follow the hold instructions:   Hold seven (7) days prior for once weekly injectable doses [semaglutide (Ozempic, Wegovy), dulaglutide (Trulicity), exenatide ER (Bydureon), tirzepatide (Mounjaro)]  Hold the day before and day of for once daily injectable GLP-1 agonists [exenatide (Byetta), liraglutide (Saxenda, Victoza)]  Hold seven (7) days for oral semaglutide (Rybelsus)     Tell your healthcare provider about all medicines you take and any allergies you may have.      [] Fasting Requirements  Nothing to eat for 8 hours before surgery unless instructed differently by the surgery nurse.   You may have clear liquids up to two hours before your arrival time (coffee, tea, water, or Gatorade. No cream or milk)  If your primary care provider has instructed you to continue oral medications, you may take them on the morning of surgery with a small sip of water.    No alcohol or smoking after 12:00am the day of surgery.    [] You will receive a call from a surgery nurse 3-5 days prior to surgery.     []DO NOT BRING FMLA WITH TO SURGERY.  These should be sent to the provider's office by fax to [phone]   Use Chlorhexidine Gluconate 4% soap to help prevent surgical site infections    You play an important part in helping to prevent a  surgical site infection. Let s review what you will need to do.    Chlorhexidine Gluconate 4% is a powerful antiseptic that will help make sure your skin is free of germs. It looks and feels just like liquid soap and should be used liked a shower gel.    **[facility] patients can receive free Chlorhexidine Gluconate 4% soap for surgery at any outpatient [facility] pharmacy. You can also buy this over the counter at any pharmacy.**    Do not shave within 12 inches of your incision (surgical cut) area for at least 3 days before surgery. Shaving can make small cuts in the skin. This puts you at a higher risk of infection.    Items you will need for each shower:   1 newly washed towel   4 ounces of one of the above soaps   Clean pajamas or clothes to change into    Follow these steps the evening before surgery and the morning of surgery.  Remove all body piercings and jewelry, and leave them off until after your surgery.  Wash your hair and body with your regular shampoo and soap. Make sure you rinse the shampoo and soap from your hair and body.  Using clean hands, apply about 2 ounces of soap gently on your skin from your ear lobes to your toes. You don t need to scrub your skin, but make sure you liberally wash the area where your surgery will be done. Use on your groin area last. Do not use this soap on your face or head. If you get any soap in your eyes, ears or mouth, rinse right away. It is very important to let the soap stay on your skin for at least 1 minute.  Repeat step 2.   Rinse well and dry off using a clean towel. If you feel any tingling, itching or other irritation, rinse right away. It is normal to feel some coolness on the skin after using the antiseptic soap. Your skin may feel a bit dry after the shower, but do not use any lotions, creams or moisturizers. Do not use hair spray or other products in your hair. Also, do not wash with your regular soap after using this.  Dress in freshly washed clothes or  maximino. Use fresh pillowcases and sheets on your bed.  Repeat these steps the morning of surgery.    If you are allergic or sensitive to Chlorhexidine Gluconate, use an antibacterial soap instead, following the same steps.    If you cannot use the shower, wash yourself with this soap at the sink. Make sure the sink is clean before you begin, and do the best you can to clean your entire body.       Discharge Instructions for Varicose Vein Surgery    You had a procedure in which your varicose veins were surgically removed. Here s what you can do following surgery to help with your recovery.  Home care Recommendations for taking care of yourself at home include the following:    Ask someone to help you run errands or do household chores for a few days after surgery.    Keep your legs raised when you re sitting or lying down.    Begin a regular walking program, starting the day after surgery. Just walk for a few minutes at first; then work up to 5 minutes at a time. Gradually increase to 15 to 20 minutes at a time, 2 to 3 times a day.      Post Op Instructions:    You will be discharged with compression wraps from your toe to thigh and will wear the wraps for 5-7 days. You can remove the wraps to shower after 48 hours. Please ensure you continue wrapping your leg with the compression wrap for at least the next 5 days following surgery. You can transition if it is comfortable enough into thigh high compression stockings  for 1 month. You should then continue to wear your compression as much as possible.     No flying for 3 weeks post vein surgery.    For the first 2 weeks after surgery, avoid sitting and standing for long periods. Also, avoid lifting greater 20 lbs.     Take pain medication as prescribed. You are unable to drive until off of narcotics. Elevate the affected extremity as much as possible.    It is normal to have fluid drainage, some bruising, numbness and discoloration post operation, these are temporary.      Walking will help you recover more quickly.    It is ok to massage areas where the veins were taken out after dressings are removed.    You will receive pain medication after your surgery, take as needed. Take pain medication as prescribed. You are unable to drive until off of narcotics.    Ice use for the first 5-7 days is encouraged on areas of discomfort. 30 minutes on and 30 minutes off.     If you can take anti-inflammatories like motrin, advil, or ibuprofen with every meal or evening. Dose 400-600 mg at each dose. It is good to have food or something in your stomach.    For questions after business hours please call Dr. Thierry Lobo at [phone] or for Dr. Oneida Sanchez please call [phone].    For questions during business hours please call [phone]  8:00 am - 4:30 pm    Please do not hesitate to call us for questions or concerns.    After your stab phlebectomy we would like to see you two weeks after for follow up. If you don't already have a follow up appointment you should be seen at [facility] in 2 weeks.       Call your healthcare provider right away if you have any of the following:  Severe bleeding, redness, or drainage at the incision sites  Development of an ulcer (sore) at the incision sites  Numbness or tingling in legs or feet  Increasing leg pain or swelling  Fever, shaking, or chills  Chest pain or shortness of breath

## 2024-02-21 NOTE — PROGRESS NOTES
"[facility]        Patient is here for a 3 month follow up  to discuss varicose veins. Dr. Donald referring. The patient has varicose veins that are problematic in bilateral legs. Symptoms patient has been experiencing are aching and  swelling. Patient states their varicose veins are bothersome when standing and household chores. Patient has been using pain medication or anti-inflammatory's. Patient has had recent imaging on legs done. Patient has done conservative measures which include: compression stockings, elevation, and exercise. Treatment has been unsuccessful due to still having symptoms. Educated patient to work on conservative treatments.      Pt is currently taking no meds that would impact our treatment plan.    BP (!) 145/82   Pulse 69   Resp 12   Ht 4' 11\" (1.499 m)   Wt 142 lb (64.4 kg)   LMP  (LMP Unknown)   SpO2 98%   BMI 28.68 kg/m      The provider has been notified that the patient has concerns of results.     Questions patient would like addressed today are: N/A.    Refills are needed: N/A    Has homecare services and agency name:  No           "

## 2024-02-21 NOTE — PROGRESS NOTES
[facility] Vein Consult      Assessment:     1. varicose veins, left   2. spider veins, bilateral   3. No major insuffiencies noted on US today.    Plan:     1. Treatment options of conservative therapy of stockings use, exercise, weight loss, elevating legs when possible.    2. Script for compression stockings 20-30 mm hg  3. Ultrasound to evaluate legs for incompetency of both deep and superficial system .   4. Surgical treatment   Stab avulsions of left lower extremities     Risks and benefits of surgical intervention including infection, burns, dvt, thrombophlebitis, not closing, recurrence, numbness and nerve injury and need for further intervention were all discused    5. Follow up:  for procedure if approved  .   6. Call for any questions concerns or issues    Subjective:      Suzan Ballard is a 68 year old female  who was referred by Angela Lacey  for evaluation of varicose veins. Symptoms include pain, aching, fatigue, burning, edema, and dermatitis. Patient has history of leg selling, pain and vein issues that have progressed. Pain and symptoms have affected daily living and work activities needing medications. Here for evaluation today. Stocking use with compression stockings of 20-30 mm hg or greater for greater then 3 months    Allergies:Codeine, No clinical screening - see comments, and Tylenol with codeine [acetaminophen-codeine]    Past Medical History:   Diagnosis Date    Disorder of thyroid     History of meningioma     causes seizures    Hyperlipidemia     Hypertension     Seizures (H)        Past Surgical History:   Procedure Laterality Date    CARPAL TUNNEL RELEASE RT/LT Bilateral     CHOLECYSTECTOMY, LAPOROSCOPIC      CRANIOTOMY  1995    CRANIOTOMY  2012         Current Outpatient Medications:     acetaminophen (TYLENOL ARTHRITIS PAIN) 650 MG CR tablet, Take 650 mg by mouth as needed., Disp: , Rfl:     ALPRAZolam (XANAX) 0.25 MG tablet, Take 1 tablet (0.25 mg) by mouth daily as  "needed for anxiety, Disp: 8 tablet, Rfl: 0    amLODIPine (NORVASC) 10 MG tablet, TAKE 1 TABLETS(10 MG) BY MOUTH DAILY, Disp: 90 tablet, Rfl: 3    Blood Pressure Monitoring (OMRON 5 SERIES BP MONITOR) ADRIENNE, 1 each by Other route daily, Disp: , Rfl:     Calcium Carbonate-Vitamin D (CALCIUM 500 + D PO), Take 1 tablet by mouth daily., Disp: , Rfl:     fluticasone (FLONASE) 50 MCG/ACT spray, USE 1 SPRAY IN EACH NOSTRIL EVERY DAY; SHAKE BEFORE USE, Disp: , Rfl:     ibuprofen (ADVIL/MOTRIN) 800 MG tablet, Take 1 tablet (800 mg) by mouth every 8 hours as needed for moderate pain, Disp: 60 tablet, Rfl: 0    levothyroxine (SYNTHROID/LEVOTHROID) 88 MCG tablet, Take 1 tablet (88 mcg) by mouth daily, Disp: 90 tablet, Rfl: 0    meclizine 25 MG CHEW, Take 25 mg by mouth every 6 hours as needed for dizziness, Disp: , Rfl:     simvastatin (ZOCOR) 20 MG tablet, Take 1 tablet (20 mg) by mouth every evening, Disp: 90 tablet, Rfl: 2     Family History   Problem Relation Age of Onset    Prostate Cancer Father         reports that she has never smoked. She has never used smokeless tobacco. She reports current alcohol use. She reports that she does not use drugs.      Review of Systems:    Pertinent items are noted in HPI.  Patient has symptomatic veins and changes of bilateral legs. These have progressed to the point of causing symptoms on a daily basis. This causes issues with daily activities and chores such as washing dishes, vacuuming, outdoor upkeep, and standing for long lengths of time       Objective:     Vitals:    02/21/24 1132   BP: (!) 145/82   Pulse: 69   Resp: 12   SpO2: 98%   Weight: 64.4 kg (142 lb)   Height: 1.499 m (4' 11\")     Body mass index is 28.68 kg/m .    EXAM:  GENERAL: This is a well-developed 68 year old female who appears her stated age  HEAD: normocephalic  HEENT: Pupils equal and reactive bilaterally  MOUTH: mucus membranes intact. Normal dentation  CARDIAC: RRR without murmur  CHEST/LUNG:  Clear to " auscultation bilaterally  ABDOMEN: Soft, nontender, nondistended, no masses noted   NEUROLOGIC: Focally intact, nonfocal, alert and oriented x 3  INTEGUMENT: No open lesions or ulcers  VASCULAR: Pulses intact, symmetrical upper and lower extremities. There areskin changes consistent with chronic venous insufficiency. Varicose veins present in bilateral greater saphenous distribution. Spider veins present bilateral.                      Side:: Bilateral  VCSS  PAIN:: Moderate: Daily moderate activity limitation, occasional pain medication  Varicose Veins:: Moderate: Multiple: great saphenous confined to calf and thigh  Venous Edema:: Moderate: Afternoon swelling, above ankle  Skin Pigmentation:: Absent: None  Inflamation:: Absent: None  Induration:: Absent: None  Number of active ulcers:: 0  Active ulcer duration:: None  Active ulcer diameter:: None  Compression Therapy:: Full compliance stockings + elevation  VCSS Score:: 9  CEAP:: Ankle edema of venous origin (not foot edema)    Imaging:    Exam Information    Exam Date Exam Time Accession # Performing Department Results    2/21/24 11:37 AM [id number] [facility] [place]      PACS Images     Show images for US Venous Competency Bilateral     Study Result    Narrative & Impression   BILATERAL Venous Insufficiency Ultrasound (Date: 02/21/24)    BILATERAL Lower Extremity          Indication:  Surveillance Bilateral Legs Symptomatic Varicose Veins, Pain, and Swelling.      Previous: None     Patient History: Swelling     Presenting Symptoms:  Swelling, Varicose Veins, and Pain     Technique:   Supine and Upright Ultrasound of the Deep and Superficial Veins with Valsalva and Compression Augmentation Maneuvers. Duplex Imaging is performed utilizing gray-scale, Two-dimensional images, color-flow imaging, Doppler waveform analysis, and Spectral doppler imaging done with provacative maneuvers.      Incompetency Criteria:  Deep  vein reflux reported when greater than 1000 msec flow reversal. Superficial vein reflux reported when equal to or greater than 600 msec flow reversal.  vein reflux reported as greater than 350 msec flow reversal.      Right  Leg Deep Veins    CFV SFJ PFV PROX FV   PROX FV MID FV DIST POP V. PERON.   V. PTV'S   Compressibility  (FC,PC,NC) FC FC FC FC FC FC FC FC FC   Reflux -   - - - - -   -         Right Leg Saphenous Veins  Location SFJ PROX THIGH KNEE MID CALF SPJ PROX CALF MID CALF   RT GSV (mm) 6.6 3.9 4.7 3.0         Reflux - - + -         RT SSV (mm)         3.0 3.0 2.4   Reflux         - - -      Location SFJ   RT AASV (mm) 2.3   Reflux -      Left Leg Deep Veins    CFV SFJ PFV PROX SFV PROX SFV MID SFV DIST POP V. PERON. V. PTV'S   Compressibility  (FC,PC,NC) FC FC FC FC FC FC FC FC FC   Reflux -   - - - - -   -         Lt Leg Saphenous Veins   Location SFJ PROX THIGH KNEE MID CALF SPJ PROX CALF MID CALF   LT GSV (mm) 6.5 5.2 4.8 2.4         Reflux - - + -         LT SSV (mm)         0.7 0.7 0.9   Reflux         - - -      Location SFJ   LT AASV (mm) 2.9   Reflux -      Comments: No incompetent  veins visualized.        Left GSV Proximal Thigh Varicose Vein Branch Incompetent ( 5.7 mm/ 2685 ms).        Left GSV Proximal Calf Varicose Vein Branch Incompetent (6.1 mm/ 3301 ms)      Impression:    ]     Reference:     Compressibility: FC= Fully compressible, PC= Partially compressible, NC= Non-compressible, NV= Not Visualized     Reflux: (+) Incompetent  (-) Competent, (NV) = Not Visualized     Interpretation criteria:          Duration of Retrograde flow (milliseconds)  Category Deep Veins Superficial Veins  veins   Competent < 1000ms < 500ms < 350ms   Incompetent > 1000ms > 500ms > 350ms           Thierry Lobo MD  General Surgery [phone]  Vascular Surgery [phone]

## 2024-02-23 ENCOUNTER — TELEPHONE (OUTPATIENT)
Dept: VASCULAR SURGERY | Facility: CLINIC | Age: 69
End: 2024-02-23
Payer: MEDICARE

## 2024-02-23 NOTE — TELEPHONE ENCOUNTER
Submitted request via [employer] for [id number] stab phlebectomy LLE. REF# [id number]. No auth required from Medicare.

## 2024-02-28 DIAGNOSIS — E03.9 ACQUIRED HYPOTHYROIDISM: ICD-10-CM

## 2024-02-28 RX ORDER — LEVOTHYROXINE SODIUM 88 UG/1
88 TABLET ORAL DAILY
Qty: 30 TABLET | Refills: 0 | Status: SHIPPED | OUTPATIENT
Start: 2024-02-28 | End: 2024-04-15

## 2024-03-05 ENCOUNTER — TELEPHONE (OUTPATIENT)
Dept: VASCULAR SURGERY | Facility: CLINIC | Age: 69
End: 2024-03-05

## 2024-03-06 ENCOUNTER — DOCUMENTATION ONLY (OUTPATIENT)
Dept: VASCULAR SURGERY | Facility: CLINIC | Age: 69
End: 2024-03-06
Payer: MEDICARE

## 2024-03-06 NOTE — PROGRESS NOTES
Surgery Scheduled    Confirmed surgery plan and instructions with pt.  Pt will schedule preop exam.  Writer will send instructions to pt via letter.    Surgery/Procedure: Left Stab Phlebectomy    Location: [facility]:  [address] [place] [id number] (Fax: [phone])    Date: 6/13/2024    Time: 7:00 AM    Admission Type: Outpatient    Surgeon: Dr. Lobo    OR Confirmed & :  Yes with Email sent to Lexie HUSSEIN on 3/6/2024    Entered on provider calendar:  Yes    Post Op: 6/25/2024 10:40 AM at [facility]    Wound Vac Needed: No    Home Care Needed: No    Blood Thinners Addressed: N/A    Stress Test Clearance: NO

## 2024-03-06 NOTE — LETTER
"March 6, 2024      Suzan Ballard  [address] [place] [address]            Surgery for Varicose Veins  If you have large varicose veins, surgery may be the best choice. But it will not prevent new varicose veins from forming. Surgery is most often done in a hospital or surgery center on an outpatient basis.  Varicose vein surgery    What is microphlebectomy?  Phlebectomy (say \"Karla\") is a procedure used to remove varicose veins. These are twisted and enlarged veins near the surface of the skin. The procedure is also called microphlebectomy, ambulatory phlebectomy or stab avulsion.    How is the procedure done?  The procedure is usually done in your doctor's office. You will get medicine to numb the area.  Your doctor will make several tiny cuts (incisions) in the skin. The varicose veins will be removed through the cuts.  You most likely will not need stitches to close the cuts.    What can you expect after the procedure?  Your doctor may wrap your leg in a bandage. You may also wear compression stockings. Your doctor will tell you how long to wear them.  You can go home the same day. You will probably be able to do your usual activities the next day. You may have a little bruising and numbness.  Follow-up care is a key part of your treatment and safety. Be sure to make and go to all appointments, and call your doctor if you are having problems. It's also a good idea to know your test results and keep a list of the medicines you take.  Current as of: December 19, 2022  Author: [facility] Staff  Medical Review:Dilia Wilson MD - Family Medicine & Nathen Vera MD - Family Medicine & Donell Dye MD - Vascular Surgery  Suzan,  Your visit to [facility] for your surgery or procedure is coming soon and we look forward to seeing you! This friendly reminder and pre-procedure checklist will help to ensure your procedure/surgery goes smoothly and meets your expectations. At [facility], our goal is to provide you with a great patient experience and to deliver genuine, professional care to every patient.   Please complete all the steps in advance of your visit. If you have any questions about the items listed below, please give our office a call. We can be reached at [phone] or visit our website at [email or web address]  for more information.   Procedure: Phlebectomy of Varicose Veins, Left Leg  Surgeon: Dr. Thierry Lobo  Procedure Date :  6/13/2024  Procedure Location: [facility]:  [address] [place] [address] [address] [place] [address] (Fax: [phone])  Procedure Time :  7:00 AM  Arrival Time: 6:00 AM  Admission Type: Outpatient  Post Operative Appointment with Dr Thierry Lobo: 6/25/2024 10:40 AM at [facility]    IF YOU NEED TO RESCHEDULE OR CANCEL YOUR PROCEDURE FOR ANY REASON PLEASE CALL THE CLINIC AS SOON AS POSSIBLE -[phone].    Complete the following checklist before your procedure/surgery    [] Contact your insurance regarding coverage. We will do a prior authorization, but please be aware this doesn't mean you are covered at 100%. If your insurance has changed please notify us.  Please check with your insurance for coverage and your out of pocket.  Stab phlebectomy outpatient procedure code: 65817  Stab phlebectomy in clinic procedure code: 94762  Vein ligation procedure code: 34210  If you would like a [facility] Estimate for your upcoming service/procedure contact Cost of Care Estimates at [phone], advocates are available Monday - Friday 8am - 5pm.  You may also submit a request online through your [employer] account.  If your procedure is at [facility] please contact the numbers below for Cost of Care Estimates.   - Facility Charge: [phone]    Anesthesiology charge:  [phone]    For all self-pay, estimate, or anesthesia billing questions at [facility],  the contact information is below:  Who to contact: Lamar ZIEGLER  [facility] number: [phone]  Prepay number: [phone]    [] Pre-Operative Physical   You will need a Pre-Op physical within 30 days of surgery date from your primary provider.     [] Pre-Operative Medication Instructions  Contact your prescribing provider for instructions before discontinuing any medications including anticoagulants. (Examples: Coumadin, Heparin, Xarelto, Eliquis, Plavix, Pradaxa, Effiient, Briliant) We would like you stop them 3-5 days before surgery, depending on the medicaion. HOWEVER, please follow the advice of your primary care provider. Most surgeons will have you remain on your aspirin.      Not Applicable    If you take these diabetic medications, please discuss with your primary doctor and follow the hold instructions:   Hold seven (7) days prior for once weekly injectable doses [semaglutide (Ozempic, Wegovy), dulaglutide (Trulicity), exenatide ER (Bydureon), tirzepatide (Mounjaro)]  Hold the day before and day of for once daily injectable GLP-1 agonists [exenatide (Byetta), liraglutide (Saxenda, Victoza)]  Hold seven (7) days for oral semaglutide (Rybelsus)     Tell your healthcare provider about all medicines you take and any allergies you may have.      [] Fasting Requirements  Nothing to eat for 8 hours before surgery unless instructed differently by the surgery nurse.   You may have clear liquids up to two hours before your arrival time (coffee, tea, water, or Gatorade. No cream or milk)  If your primary care provider has instructed you to continue oral medications, you may take them on the morning of surgery with a small sip of water.    No alcohol or smoking after 12:00am the day of surgery.    [] You will receive a call from a surgery nurse 3-5 days prior to surgery.     []DO NOT BRING FMLA WITH TO SURGERY.  These should be sent to the provider's office by fax to [phone]   Use  Chlorhexidine Gluconate 4% soap to help prevent surgical site infections    You play an important part in helping to prevent a surgical site infection. Let s review what you will need to do.    Chlorhexidine Gluconate 4% is a powerful antiseptic that will help make sure your skin is free of germs. It looks and feels just like liquid soap and should be used liked a shower gel.    **[facility] patients can receive free Chlorhexidine Gluconate 4% soap for surgery at any outpatient [facility] pharmacy. You can also buy this over the counter at any pharmacy.**    Do not shave within 12 inches of your incision (surgical cut) area for at least 3 days before surgery. Shaving can make small cuts in the skin. This puts you at a higher risk of infection.    Items you will need for each shower:   1 newly washed towel   4 ounces of one of the above soaps   Clean pajamas or clothes to change into    Follow these steps the evening before surgery and the morning of surgery.  Remove all body piercings and jewelry, and leave them off until after your surgery.  Wash your hair and body with your regular shampoo and soap. Make sure you rinse the shampoo and soap from your hair and body.  Using clean hands, apply about 2 ounces of soap gently on your skin from your ear lobes to your toes. You don t need to scrub your skin, but make sure you liberally wash the area where your surgery will be done. Use on your groin area last. Do not use this soap on your face or head. If you get any soap in your eyes, ears or mouth, rinse right away. It is very important to let the soap stay on your skin for at least 1 minute.  Repeat step 2.   Rinse well and dry off using a clean towel. If you feel any tingling, itching or other irritation, rinse right away. It is normal to feel some coolness on the skin after using the antiseptic soap. Your skin may feel a bit dry after the shower, but do not use any lotions, creams or moisturizers. Do not use hair spray or  other products in your hair. Also, do not wash with your regular soap after using this.  Dress in freshly washed clothes or pajamas. Use fresh pillowcases and sheets on your bed.  Repeat these steps the morning of surgery.    If you are allergic or sensitive to Chlorhexidine Gluconate, use an antibacterial soap instead, following the same steps.    If you cannot use the shower, wash yourself with this soap at the sink. Make sure the sink is clean before you begin, and do the best you can to clean your entire body.       Discharge Instructions for Varicose Vein Surgery    You had a procedure in which your varicose veins were surgically removed. Here s what you can do following surgery to help with your recovery.  Home care Recommendations for taking care of yourself at home include the following:    Ask someone to help you run errands or do household chores for a few days after surgery.    Keep your legs raised when you re sitting or lying down.    Begin a regular walking program, starting the day after surgery. Just walk for a few minutes at first; then work up to 5 minutes at a time. Gradually increase to 15 to 20 minutes at a time, 2 to 3 times a day.      Post Op Instructions:    You will be discharged with compression wraps from your toe to thigh and will wear the wraps for 5-7 days. You can remove the wraps to shower after 48 hours. Please ensure you continue wrapping your leg with the compression wrap for at least the next 5 days following surgery. You can transition if it is comfortable enough into thigh high compression stockings  for 1 month. You should then continue to wear your compression as much as possible.     No flying for 3 weeks post vein surgery.    For the first 2 weeks after surgery, avoid sitting and standing for long periods. Also, avoid lifting greater 20 lbs.     Take pain medication as prescribed. You are unable to drive until off of narcotics. Elevate the affected extremity as much as  possible.    It is normal to have fluid drainage, some bruising, numbness and discoloration post operation, these are temporary.     Walking will help you recover more quickly.    It is ok to massage areas where the veins were taken out after dressings are removed.    You will receive pain medication after your surgery, take as needed. Take pain medication as prescribed. You are unable to drive until off of narcotics.    Ice use for the first 5-7 days is encouraged on areas of discomfort. 30 minutes on and 30 minutes off.     If you can take anti-inflammatories like motrin, advil, or ibuprofen with every meal or evening. Dose 400-600 mg at each dose. It is good to have food or something in your stomach.    For questions after business hours please call Dr. Thierry Lobo at [phone] or for Dr. Oneida Sanchez please call [phone].    For questions during business hours please call [phone]  8:00 am - 4:30 pm    Please do not hesitate to call us for questions or concerns.    After your stab phlebectomy we would like to see you two weeks after for follow up. If you don't already have a follow up appointment you should be seen at [facility] in 2 weeks.       Call your healthcare provider right away if you have any of the following:  Severe bleeding, redness, or drainage at the incision sites  Development of an ulcer (sore) at the incision sites  Numbness or tingling in legs or feet  Increasing leg pain or swelling  Fever, shaking, or chills  Chest pain or shortness of breath

## 2024-03-12 PROBLEM — I83.893 SYMPTOMATIC VARICOSE VEINS OF BOTH LOWER EXTREMITIES: Status: ACTIVE | Noted: 2024-02-21

## 2024-03-26 ENCOUNTER — TELEPHONE (OUTPATIENT)
Dept: GASTROENTEROLOGY | Facility: CLINIC | Age: 69
End: 2024-03-26
Payer: MEDICARE

## 2024-03-26 NOTE — TELEPHONE ENCOUNTER
Caller: Suzan Ballard      Reason for Reschedule/Cancellation   (please be detailed, any staff messages or encounters to note?): patient is having knee surgery      Prior to reschedule please review:  Ordering Provider: VITALY  Sedation Determined: MAC d/t location  Does patient have any ASC Exclusions, please identify?: NO      Notes on Cancelled Procedure:  Procedure: Lower Endoscopy [Colonoscopy]   Date: 5/9/24  Location: [facility]; [address] [place] [address], [address], [place] [address]  Surgeon: GALA      Rescheduled: No, THE PATIENT WILL CALL IN THE FUTURE       Did you cancel or rescheduled an EUS procedure? No.

## 2024-03-29 ENCOUNTER — OFFICE VISIT (OUTPATIENT)
Dept: FAMILY MEDICINE | Facility: CLINIC | Age: 69
End: 2024-03-29
Payer: MEDICARE

## 2024-03-29 VITALS
TEMPERATURE: 97.8 F | HEART RATE: 64 BPM | DIASTOLIC BLOOD PRESSURE: 70 MMHG | HEIGHT: 60 IN | SYSTOLIC BLOOD PRESSURE: 132 MMHG | OXYGEN SATURATION: 97 % | BODY MASS INDEX: 27.68 KG/M2 | WEIGHT: 141 LBS | RESPIRATION RATE: 20 BRPM

## 2024-03-29 DIAGNOSIS — G47.33 OSA (OBSTRUCTIVE SLEEP APNEA): ICD-10-CM

## 2024-03-29 DIAGNOSIS — E03.9 ACQUIRED HYPOTHYROIDISM: ICD-10-CM

## 2024-03-29 DIAGNOSIS — E11.9 TYPE 2 DIABETES MELLITUS WITHOUT COMPLICATION, WITHOUT LONG-TERM CURRENT USE OF INSULIN (H): ICD-10-CM

## 2024-03-29 DIAGNOSIS — M76.62 LEFT ACHILLES TENDINITIS: ICD-10-CM

## 2024-03-29 DIAGNOSIS — Z01.818 PREOP GENERAL PHYSICAL EXAM: Primary | ICD-10-CM

## 2024-03-29 DIAGNOSIS — G40.209 LOCALIZATION-RELATED SYMPTOMATIC EPILEPSY AND EPILEPTIC SYNDROMES WITH COMPLEX PARTIAL SEIZURES, NOT INTRACTABLE, WITHOUT STATUS EPILEPTICUS (H): ICD-10-CM

## 2024-03-29 LAB
ATRIAL RATE - MUSE: 63 BPM
DIASTOLIC BLOOD PRESSURE - MUSE: NORMAL MMHG
ERYTHROCYTE [DISTWIDTH] IN BLOOD BY AUTOMATED COUNT: 13 % (ref 10–15)
HBA1C MFR BLD: 6.7 % (ref 0–5.6)
HCT VFR BLD AUTO: 45.3 % (ref 35–47)
HGB BLD-MCNC: 14.7 G/DL (ref 11.7–15.7)
INTERPRETATION ECG - MUSE: NORMAL
MCH RBC QN AUTO: 25.9 PG (ref 26.5–33)
MCHC RBC AUTO-ENTMCNC: 32.5 G/DL (ref 31.5–36.5)
MCV RBC AUTO: 80 FL (ref 78–100)
P AXIS - MUSE: 47 DEGREES
PLATELET # BLD AUTO: 267 10E3/UL (ref 150–450)
PR INTERVAL - MUSE: 180 MS
QRS DURATION - MUSE: 92 MS
QT - MUSE: 446 MS
QTC - MUSE: 456 MS
R AXIS - MUSE: -17 DEGREES
RBC # BLD AUTO: 5.68 10E6/UL (ref 3.8–5.2)
SYSTOLIC BLOOD PRESSURE - MUSE: NORMAL MMHG
T AXIS - MUSE: 41 DEGREES
VENTRICULAR RATE- MUSE: 63 BPM
WBC # BLD AUTO: 5.7 10E3/UL (ref 4–11)

## 2024-03-29 PROCEDURE — 36415 COLL VENOUS BLD VENIPUNCTURE: CPT | Performed by: STUDENT IN AN ORGANIZED HEALTH CARE EDUCATION/TRAINING PROGRAM

## 2024-03-29 PROCEDURE — 93005 ELECTROCARDIOGRAM TRACING: CPT | Performed by: STUDENT IN AN ORGANIZED HEALTH CARE EDUCATION/TRAINING PROGRAM

## 2024-03-29 PROCEDURE — 99215 OFFICE O/P EST HI 40 MIN: CPT | Mod: 25 | Performed by: STUDENT IN AN ORGANIZED HEALTH CARE EDUCATION/TRAINING PROGRAM

## 2024-03-29 PROCEDURE — 84443 ASSAY THYROID STIM HORMONE: CPT

## 2024-03-29 PROCEDURE — 83036 HEMOGLOBIN GLYCOSYLATED A1C: CPT | Performed by: STUDENT IN AN ORGANIZED HEALTH CARE EDUCATION/TRAINING PROGRAM

## 2024-03-29 PROCEDURE — 93010 ELECTROCARDIOGRAM REPORT: CPT | Mod: OFF | Performed by: INTERNAL MEDICINE

## 2024-03-29 PROCEDURE — 80048 BASIC METABOLIC PNL TOTAL CA: CPT | Performed by: STUDENT IN AN ORGANIZED HEALTH CARE EDUCATION/TRAINING PROGRAM

## 2024-03-29 PROCEDURE — 85027 COMPLETE CBC AUTOMATED: CPT | Performed by: STUDENT IN AN ORGANIZED HEALTH CARE EDUCATION/TRAINING PROGRAM

## 2024-03-29 RX ORDER — LAMOTRIGINE 100 MG/1
TABLET ORAL
Qty: 270 TABLET | Refills: 1 | Status: SHIPPED | OUTPATIENT
Start: 2024-03-29 | End: 2024-06-11

## 2024-03-29 ASSESSMENT — PAIN SCALES - GENERAL: PAINLEVEL: NO PAIN (0)

## 2024-03-29 NOTE — PROGRESS NOTES
Preoperative Evaluation  [address] [address]  Phone: [phone]  Fax: [phone]  Primary Provider: William Lacey  Pre-op Performing Provider: WILLIAM LACEY  Mar 29, 2024   [id number]}    Suzan is a 68 year old, presenting for the following:  Pre-Op Exam        3/29/2024     1:29 PM   Additional Questions   Roomed by polly     Surgical Information  Surgery/Procedure: left knee, UKA replacement surgery.    Surgery Location: [facility]   Surgeon: DR Sauer   Surgery Date: 04/23/24  Time of Surgery:  6 am arrival time   Where patient plans to recover: At home with family, after one overnight   Fax number for surgical facility: [phone]    Assessment & Plan     The proposed surgical procedure is considered INTERMEDIATE risk.     Diagnosis Comments   1. Preop general physical exam  EKG 12-lead, tracing only, CBC with platelets, Basic metabolic panel  (Ca, Cl, CO2, Creat, Gluc, K, Na, BUN)       2. ANSHU (obstructive sleep apnea)  Adult Sleep Eval & Management  Referral       3. Left Achilles tendinitis  Physical Therapy  Referral       4. Type 2 diabetes mellitus without complication, without long-term current use of insulin (H)  Hemoglobin A1c       5. Acquired hypothyroidism  TSH with free T4 reflex       6. Localization-related symptomatic epilepsy and epileptic syndromes with complex partial seizures, not intractable, without status epilepticus (H)  Adult Neurology  Referral, lamoTRIgine (LAMICTAL) 100 MG tablet           Preop  Left knee pain  RCRI 0  EKG unremarkable  Will get CBC and BMP for anesthesia purposes  Medication management:  - Avoid all NSAIDs 7 days ahead of time  - Okay to continue Tylenol on day of procedure  - Hold Xanax on day of surgery  - Okay to continue amlodipine, Flonase, levothyroxine, simvastatin and Lamictal on day of surgery  -Hold all multivitamins 7 days ahead of  time  -Hold meclizine the day of procedure    As long as blood work looks okay, patient is tentatively cleared for surgery     Left Achilles tendinitis:  Ongoing issue for her over the last 6 months  Has progressed and now it has been difficult for her to walk on the left ankle  Strain/counterstrain technique applied with some relief from her pain  Plan:  - Will send to PT for Achilles tendinitis  - Advised patient to ice the area, stretch it consistently    Hypothyroidism:  Last TSH below normal range at 0.12 on 10/11/2023  I had advised patient to skip a day of her 88 mcg of the Synthroid  Plan:  - Repeat TSH and titrate medication accordingly    Type 2 diabetes:  Last A1c 6.4  Is not currently on any medications for type 2 diabetes  ANSHU is uncontrolled.  Reviewed with patient that this could be contributing to insulin resistant.  She is receptive to this information.  Plan:  - Recheck A1c and start medications for diabetes accordingly    ANSHU not on CPAP:  Referral to sleep medicine provided    Epilepsy:   Patient has been on her lamictal but unclear for how long.  Was being managed by neuro but has been lost to follow-up  I had sent her to neurology last time and refilled it.  She has not set up with general neurology at this time.  For unclear reasons, Lamictal is also off of her med list.  Will refer to general neurology and resume the Lamictal ASAP.          - No identified additional risk factors other than previously addressed        Recommendation  APPROVAL GIVEN to proceed with proposed procedure pending review of diagnostic evaluation.      42 minutes spent by me on the date of the encounter doing chart review, history and exam, documentation and further activities per the note    Subjective       HPI related to upcoming procedure:     Patient is a pleasant 68-year-old female with PMH of partial epilepsy, mixed hyperlipidemia, meningioma s/p resection x 2 (2018) and gamma knife treatment (12/2023), type 2  diabetes, ANSHU not on CPAP, hypothyroidism who presents today for preop for her left knee.    Patient's left knee has been bothering her.  Is aching or in pain most of the time.  Is not necessarily locking or clicking on her.    She is able to walk on it    Has undergone anesthesia several times in the past without any difficulty.  No history of postop confusion, delirium.  No history of bleeding disorders.            3/29/2024     1:07 PM   Preop Questions   1. Have you ever had a heart attack or stroke? No   2. Have you ever had surgery on your heart or blood vessels, such as a stent placement, a coronary artery bypass, or surgery on an artery in your head, neck, heart, or legs? No   3. Do you have chest pain with activity? No   4. Do you have a history of  heart failure? No   5. Do you currently have a cold, bronchitis or symptoms of other infection? No   6. Do you have a cough, shortness of breath, or wheezing? No   7. Do you or anyone in your family have previous history of blood clots? No   8. Do you or does anyone in your family have a serious bleeding problem such as prolonged bleeding following surgeries or cuts? No   9. Have you ever had problems with anemia or been told to take iron pills? No   10. Have you had any abnormal blood loss such as black, tarry or bloody stools, or abnormal vaginal bleeding? No   11. Have you ever had a blood transfusion? No   12. Are you willing to have a blood transfusion if it is medically needed before, during, or after your surgery? Yes   13. Have you or any of your relatives ever had problems with anesthesia? No   14. Do you have sleep apnea, excessive snoring or daytime drowsiness? YES -  (+) ANSHU but not on ANSHU   14a. Do you have a CPAP machine? No   15. Do you have any artifical heart valves or other implanted medical devices like a pacemaker, defibrillator, or continuous glucose monitor? No   16. Do you have artificial joints? No   17. Are you allergic to latex? No        Health Care Directive  Patient does not have a Health Care Directive or Living Will: Patient states has Advance Directive and will bring in a copy to clinic.    Preoperative Review of    reviewed - controlled substances reflected in medication list.    Patient Active Problem List    Diagnosis Date Noted    Symptomatic varicose veins of both lower extremities 02/21/2024     Priority: Medium    Diabetes mellitus, type 2 (H) 10/12/2023     Priority: Medium    Benign neoplasm of cerebral meninges (H) 09/16/2022     Priority: Medium     Formatting of this note might be different from the original.  Created by Conversion  [facility] Annotation: Oct 29 2007  4:02PM - Lamont Kapadia: s/p craniotomy   and removal of multiple meningioma regrowths, 11/12/12      Carpal tunnel syndrome 09/16/2022     Priority: Medium     Formatting of this note might be different from the original.  Created by Conversion      Disorder of bone and cartilage 09/16/2022     Priority: Medium     Formatting of this note might be different from the original.  Created by Conversion    Replacement Utility updated for latest IMO load      Hereditary and idiopathic peripheral neuropathy 09/16/2022     Priority: Medium     Formatting of this note might be different from the original.  Created by Conversion    Replacement Utility updated for latest IMO load      Acute pain of left knee 07/28/2021     Priority: Medium    Mixed hyperlipidemia 07/28/2021     Priority: Medium    Essential hypertension 07/28/2021     Priority: Medium    Acquired hypothyroidism 07/28/2021     Priority: Medium    Meningioma (H) 07/28/2021     Priority: Medium    Cooper's neuroma 06/04/2018     Priority: Medium    Allergic rhinitis 10/01/2015     Priority: Medium    GERD (gastroesophageal reflux disease) 10/01/2015     Priority: Medium    Seizure disorder (H) 06/11/2015     Priority: Medium    Osteoarthritis of right knee 06/11/2015     Priority: Medium    Partial  epilepsy with impairment of consciousness, intractable (H) 09/09/2014     Priority: Medium     Seizures related to multiple meningiomas, last presenting Nov 2012 with 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 Also possible right arm sensory seizures. Seizures broke thru LEV 1500/d Nov 2012 in context of meningioma recurrence. Meningiomas resected and /d added which patient did not tolerate so changed to LEV 2000/d which is max she can tolerate. Seizures broke through maximally tolerated levetiracetam so transitioned to lamotrigine and increased to 300 mg/d with initial control of seizures.          Past Medical History:   Diagnosis Date    Disorder of thyroid     History of meningioma     causes seizures    Hyperlipidemia     Hypertension     Seizures (H)      Past Surgical History:   Procedure Laterality Date    CARPAL TUNNEL RELEASE RT/LT Bilateral     CHOLECYSTECTOMY, LAPOROSCOPIC      CRANIOTOMY  1995    CRANIOTOMY  2012     Current Outpatient Medications   Medication Sig Dispense Refill    acetaminophen (TYLENOL ARTHRITIS PAIN) 650 MG CR tablet Take 650 mg by mouth as needed.      ALPRAZolam (XANAX) 0.25 MG tablet Take 1 tablet (0.25 mg) by mouth daily as needed for anxiety 8 tablet 0    amLODIPine (NORVASC) 10 MG tablet TAKE 1 TABLETS(10 MG) BY MOUTH DAILY 90 tablet 3    Blood Pressure Monitoring (OMRON 5 SERIES BP MONITOR) ADRIENNE 1 each by Other route daily      Calcium Carbonate-Vitamin D (CALCIUM 500 + D PO) Take 1 tablet by mouth daily.      fluticasone (FLONASE) 50 MCG/ACT spray USE 1 SPRAY IN EACH NOSTRIL EVERY DAY; SHAKE BEFORE USE      levothyroxine (SYNTHROID/LEVOTHROID) 88 MCG tablet Take 1 tablet (88 mcg) by mouth daily 30 tablet 0    meclizine 25 MG CHEW Take 25 mg by mouth every 6 hours as needed for dizziness      simvastatin (ZOCOR) 20 MG tablet Take 1 tablet (20 mg) by mouth every evening 90 tablet 2       Allergies   Allergen Reactions    Codeine Nausea and Vomiting     No Clinical Screening - See Comments      Fast injection of Contrast dye= Dizziness, nausea.     Tylenol With Codeine [Acetaminophen-Codeine] Nausea     Dizziness        Social History     Tobacco Use    Smoking status: Never     Passive exposure: Never    Smokeless tobacco: Never   Substance Use Topics    Alcohol use: Yes     Comment: rarely     Family History   Problem Relation Age of Onset    Prostate Cancer Father      History   Drug Use Unknown         Review of Systems    Review of Systems  CONSTITUTIONAL: NEGATIVE for fever, chills, change in weight  INTEGUMENTARY/SKIN: NEGATIVE for worrisome rashes, moles or lesions  EYES: NEGATIVE for vision changes or irritation  ENT/MOUTH: NEGATIVE for ear, mouth and throat problems  RESP: NEGATIVE for significant cough or SOB  BREAST: NEGATIVE for masses, tenderness or discharge  CV: NEGATIVE for chest pain, palpitations or peripheral edema  GI: NEGATIVE for nausea, abdominal pain, heartburn, or change in bowel habits  : NEGATIVE for frequency, dysuria, or hematuria  MUSCULOSKELETAL: NEGATIVE for significant arthralgias or myalgia  NEURO: NEGATIVE for weakness, dizziness or paresthesias  ENDOCRINE: NEGATIVE for temperature intolerance, skin/hair changes  HEME: NEGATIVE for bleeding problems  PSYCHIATRIC: NEGATIVE for changes in mood or affect    Objective    /70 (BP Location: Right arm, Patient Position: Sitting)   Pulse 64   Temp 97.8  F (36.6  C) (Oral)   Resp 20   Ht 1.524 m (5')   Wt 64 kg (141 lb)   LMP  (LMP Unknown)   SpO2 97%   BMI 27.54 kg/m     Estimated body mass index is 27.54 kg/m  as calculated from the following:    Height as of this encounter: 1.524 m (5').    Weight as of this encounter: 64 kg (141 lb).  Physical Exam  GENERAL: alert and no distress  EYES: Eyes grossly normal to inspection, PERRL and conjunctivae and sclerae normal  HENT: ear canals and TM's normal, nose and mouth without ulcers or lesions  NECK: no adenopathy, no  asymmetry, masses, or scars  RESP: lungs clear to auscultation - no rales, rhonchi or wheezes  CV: regular rate and rhythm, normal S1 S2, no S3 or S4, no murmur, click or rub, no peripheral edema  ABDOMEN: soft, nontender, no hepatosplenomegaly, no masses and bowel sounds normal  MS: no gross musculoskeletal defects noted, no edema  PSYCH: mentation appears normal, affect normal/bright    Recent Labs   Lab Test 12/18/23  1320 12/12/23  1401 11/08/23  1006 10/11/23  1302    143   < > 142   POTASSIUM 3.8 2.9*   < > 3.1*   CR 0.60 0.55   < > 0.41*   A1C  --   --   --  6.5*    < > = values in this interval not displayed.        Diagnostics  Labs pending at this time.  Results will be reviewed when available.   EKG: appears normal, NSR, normal axis, normal intervals, no acute ST/T changes c/w ischemia, no LVH by voltage criteria, unchanged from previous tracings    Revised Cardiac Risk Index (RCRI)  The patient has the following serious cardiovascular risks for perioperative complications:   - No serious cardiac risks = 0 points     RCRI Interpretation: 0 points: Class I (very low risk - 0.4% complication rate)    Signed Electronically by: Angela Lacey DO  Copy of this evaluation report is provided to requesting physician.

## 2024-03-30 LAB
ANION GAP SERPL CALCULATED.3IONS-SCNC: 11 MMOL/L (ref 7–15)
BUN SERPL-MCNC: 13.3 MG/DL (ref 8–23)
CALCIUM SERPL-MCNC: 9.5 MG/DL (ref 8.8–10.2)
CHLORIDE SERPL-SCNC: 103 MMOL/L (ref 98–107)
CREAT SERPL-MCNC: 0.49 MG/DL (ref 0.51–0.95)
DEPRECATED HCO3 PLAS-SCNC: 26 MMOL/L (ref 22–29)
EGFRCR SERPLBLD CKD-EPI 2021: >90 ML/MIN/1.73M2
GLUCOSE SERPL-MCNC: 97 MG/DL (ref 70–99)
POTASSIUM SERPL-SCNC: 3.2 MMOL/L (ref 3.4–5.3)
SODIUM SERPL-SCNC: 140 MMOL/L (ref 135–145)
TSH SERPL DL<=0.005 MIU/L-ACNC: 0.66 UIU/ML (ref 0.3–4.2)

## 2024-04-01 DIAGNOSIS — E11.9 TYPE 2 DIABETES MELLITUS WITHOUT COMPLICATION, WITHOUT LONG-TERM CURRENT USE OF INSULIN (H): ICD-10-CM

## 2024-04-01 DIAGNOSIS — E87.6 HYPOKALEMIA: Primary | ICD-10-CM

## 2024-04-01 RX ORDER — METFORMIN HCL 500 MG
500 TABLET, EXTENDED RELEASE 24 HR ORAL 2 TIMES DAILY WITH MEALS
Qty: 180 TABLET | Refills: 0 | Status: SHIPPED | OUTPATIENT
Start: 2024-04-01 | End: 2024-07-04

## 2024-04-01 RX ORDER — POTASSIUM CHLORIDE 1.5 G/1.58G
20 POWDER, FOR SOLUTION ORAL DAILY
Qty: 3 PACKET | Refills: 0 | Status: SHIPPED | OUTPATIENT
Start: 2024-04-01 | End: 2024-04-04

## 2024-04-02 ENCOUNTER — THERAPY VISIT (OUTPATIENT)
Dept: PHYSICAL THERAPY | Facility: CLINIC | Age: 69
End: 2024-04-02
Attending: STUDENT IN AN ORGANIZED HEALTH CARE EDUCATION/TRAINING PROGRAM
Payer: MEDICARE

## 2024-04-02 DIAGNOSIS — M76.62 LEFT ACHILLES TENDINITIS: ICD-10-CM

## 2024-04-02 PROCEDURE — 97161 PT EVAL LOW COMPLEX 20 MIN: CPT | Mod: GP | Performed by: PHYSICAL THERAPIST

## 2024-04-02 PROCEDURE — 97110 THERAPEUTIC EXERCISES: CPT | Mod: GP | Performed by: PHYSICAL THERAPIST

## 2024-04-02 NOTE — PROGRESS NOTES
PHYSICAL THERAPY EVALUATION  Type of Visit: Evaluation    See electronic medical record for Abuse and Falls Screening details.    Subjective       Presenting condition or subjective complaint: L ankle painPt reports onset of L achilles pain without known cause about three months ago (1/2/24).  Having a partial L knee replacement on 4/23/24 and per pt MD believes the achilles might be related to the L issue. Referred to PT.   Date of onset: 01/02/24    Relevant medical history: Sleep disorder like apnea; Thyroid problems   Dates & types of surgery: gamma knife surgery 2023    Prior diagnostic imaging/testing results:       Prior therapy history for the same diagnosis, illness or injury: No      Prior Level of Function  Transfers:   Ambulation:   ADL:   IADL:     Living Environment  Social support: With family members   Type of home: 2-story   Stairs to enter the home: Yes       Ramp: No   Stairs inside the home: Yes       Help at home: None  Equipment owned:       Employment: No retired  Hobbies/Interests: shopping, walking    Patient goals for therapy: can't walk for longer periods    Pain assessment: See objective evaluation for additional pain details     Objective   FOOT/ANKLE EVALUATION  PAIN: Pain Level at Rest: 7/10  Pain Level with Use: 10/10  Pain Location: distal L achilles  Pain Quality: Sharp  Pain Frequency: constant  Pain is Worst: activity dependent  Pain is Exacerbated By: a lot of walking, going up/down stairs  Pain is Relieved By: stretch  Pain Progression: Worsened  INTEGUMENTARY (edema, incisions):  mild swelling L distal achilles  POSTURE:   GAIT:   Weightbearing Status:   Assistive Device(s):   Gait Deviations: Knee extension decreased L  BALANCE/PROPRIOCEPTION:   WEIGHT BEARING ALIGNMENT:   NON-WEIGHTBEARING ALIGNMENT:    ROM:   (Degrees) Left AROM Left PROM  Right AROM Right PROM   Ankle Dorsiflexion 6  12    Ankle Plantarflexion       Ankle Inversion       Ankle Eversion       Great Toe  Flexion       Great Toe Extension       Pain:   End feel:     R knee hyperextension 3 deg  L knee extension 3 deg      STRENGTH:  unable to perform single heel raise on L due to pain  FLEXIBILITY: Decreased gastroc L  SPECIAL TESTS:   FUNCTIONAL TESTS:   PALPATION:   JOINT MOBILITY:     Assessment & Plan   CLINICAL IMPRESSIONS  Medical Diagnosis: Left Achilles tendinitis    Treatment Diagnosis: L achilles tendonitis   Impression/Assessment: Patient is a 68 year old female with L achilles complaints.  The following significant findings have been identified: Pain, Decreased ROM/flexibility, Decreased strength, Inflammation, Impaired gait, and Decreased activity tolerance. These impairments interfere with their ability to perform self care tasks, recreational activities, household mobility, and community mobility as compared to previous level of function.     Clinical Decision Making (Complexity):  Clinical Presentation: Stable/Uncomplicated  Clinical Presentation Rationale: based on medical and personal factors listed in PT evaluation  Clinical Decision Making (Complexity): Low complexity    PLAN OF CARE  Treatment Interventions:  Interventions: Manual Therapy, Neuromuscular Re-education, Therapeutic Activity, Therapeutic Exercise    Long Term Goals     PT Goal 1  Goal Identifier: ambulation  Goal Description: Pt will be able to walk for 30 min without increased L achilles pain  Rationale: to maximize safety and independence with performance of ADLs and functional tasks;to maximize safety and independence within the home;to maximize safety and independence within the community;to maximize safety and independence with self cares  Target Date: 05/28/24      Frequency of Treatment: 1x/wk  Duration of Treatment: 8 wks    Recommended Referrals to Other Professionals:   Education Assessment:        Risks and benefits of evaluation/treatment have been explained.   Patient/Family/caregiver agrees with Plan of Care.      Evaluation Time:     PT Eval, Low Complexity Minutes (41952): 12       Signing Clinician: Alverto Barroso PT      [facility]                                                                                   OUTPATIENT PHYSICAL THERAPY      PLAN OF TREATMENT FOR OUTPATIENT REHABILITATION   Patient's Last Name, First Name, JOSEMARIA ALEJANDRA BallardSuzan  EDIL YOB: 1955   Provider's Name   [facility]   Medical Record No.  [id number]     Onset Date: 01/02/24  Start of Care Date: 04/02/24     Medical Diagnosis:  Left Achilles tendinitis      PT Treatment Diagnosis:  L achilles tendonitis Plan of Treatment  Frequency/Duration: 1x/wk/ 8 wks    Certification date from 04/02/24 to 05/28/24         See note for plan of treatment details and functional goals     Alverto Barroso, PT                         I CERTIFY THE NEED FOR THESE SERVICES FURNISHED UNDER        THIS PLAN OF TREATMENT AND WHILE UNDER MY CARE     (Physician attestation of this document indicates review and certification of the therapy plan).              Referring Provider:  Angela Lacey    Initial Assessment  See Epic Evaluation- Start of Care Date: 04/02/24

## 2024-04-09 ENCOUNTER — THERAPY VISIT (OUTPATIENT)
Dept: PHYSICAL THERAPY | Facility: CLINIC | Age: 69
End: 2024-04-09
Attending: STUDENT IN AN ORGANIZED HEALTH CARE EDUCATION/TRAINING PROGRAM
Payer: MEDICARE

## 2024-04-09 DIAGNOSIS — M76.62 LEFT ACHILLES TENDINITIS: Primary | ICD-10-CM

## 2024-04-09 PROCEDURE — 97110 THERAPEUTIC EXERCISES: CPT | Mod: GP | Performed by: PHYSICAL THERAPIST

## 2024-04-09 PROCEDURE — 97140 MANUAL THERAPY 1/> REGIONS: CPT | Mod: GP | Performed by: PHYSICAL THERAPIST

## 2024-04-15 DIAGNOSIS — E03.9 ACQUIRED HYPOTHYROIDISM: ICD-10-CM

## 2024-04-16 ENCOUNTER — THERAPY VISIT (OUTPATIENT)
Dept: PHYSICAL THERAPY | Facility: CLINIC | Age: 69
End: 2024-04-16
Attending: STUDENT IN AN ORGANIZED HEALTH CARE EDUCATION/TRAINING PROGRAM
Payer: MEDICARE

## 2024-04-16 DIAGNOSIS — M76.62 LEFT ACHILLES TENDINITIS: Primary | ICD-10-CM

## 2024-04-16 PROCEDURE — 97140 MANUAL THERAPY 1/> REGIONS: CPT | Mod: GP | Performed by: PHYSICAL THERAPIST

## 2024-04-16 PROCEDURE — 97110 THERAPEUTIC EXERCISES: CPT | Mod: GP | Performed by: PHYSICAL THERAPIST

## 2024-04-16 RX ORDER — LEVOTHYROXINE SODIUM 88 UG/1
88 TABLET ORAL DAILY
Qty: 90 TABLET | Refills: 3 | Status: SHIPPED | OUTPATIENT
Start: 2024-04-16 | End: 2024-07-12

## 2024-04-16 NOTE — PROGRESS NOTES
DISCHARGE  Reason for Discharge: Pt undergoing TKA, to self manage with current HEP    Equipment Issued:     Discharge Plan: Patient to continue home program.   04/16/24 0500   Appointment Info   Signing clinician's name / credentials Alverto Barroso DPT   Total/Authorized Visits 10 (E&T)   Visits Used 3   Medical Diagnosis Left Achilles tendinitis   PT Tx Diagnosis L achilles tendonitis   Quick Adds Certification   Progress Note/Certification   Start of Care Date 04/02/24   Onset of illness/injury or Date of Surgery 01/02/24   Therapy Frequency 1x/wk   Predicted Duration 8 wks   Certification date from 04/02/24   Certification date to 05/28/24   Progress Note Completed Date 04/02/24   PT Goal 1   Goal Identifier ambulation   Goal Description Pt will be able to walk for 30 min without increased L achilles pain   Rationale to maximize safety and independence with performance of ADLs and functional tasks;to maximize safety and independence within the home;to maximize safety and independence within the community;to maximize safety and independence with self cares   Goal Progress 15 min as of 4/16   Target Date 05/28/24   Subjective Report   Subjective Report Improving. Can where regular shoes again, notes that her daughter commented that she's limping less. The soft tissue work was helpful. Having L TKA next week.   Objective Measures   Objective Measures Objective Measure 1   Objective Measure 1   Objective Measure L ankle AROM   Details 12   Treatment Interventions (PT)   Interventions Manual Therapy;Therapeutic Procedure/Exercise;Neuromuscular Re-education;Therapeutic Activity   Therapeutic Procedure/Exercise   Therapeutic Procedures: strength, endurance, ROM, flexibility minutes (87386) 12   Ther Proc 1 standing heel raises   Ther Proc 1 - Details x 20   PTRx Ther Proc 1 Passive Knee Extension Stretch   PTRx Ther Proc 1 - Details verbal review   PTRx Ther Proc 2 Standing Gastroc Stretch   PTRx Ther Proc 2 -  "Details 2 x 60\" cues to correct position discussed likely options of belt stretch when she starts PT for s/p L TKA next week   PTRx Ther Proc 3 Standing Soleus Stretch   PTRx Ther Proc 3 - Details 2 x 60\" cues for correct technique   Patient Response/Progress see above   PTRx Ther Proc 4 Toe Raises   PTRx Ther Proc 4 - Details x20   Therapeutic Activity   Patient Response/Progress see above   Neuromuscular Re-education   Patient Response/Progress see above   Manual Therapy   Manual Therapy: Mobilization, MFR, MLD, friction massage minutes (27472) 26   Manual Therapy 1 prone MFR to L achilles   Manual Therapy 1 - Details 25' total   Patient Response/Progress \"felt good\"   Plan   Home program see PTRx   Updates to plan of care rev of HEP   Plan for next session d/c to HEP as of 4/16--pt having TKA next week.   Total Session Time   Timed Code Treatment Minutes 38   Total Treatment Time (sum of timed and untimed services) 38         Referring Provider:  Angela Lacey    "

## 2024-05-24 DIAGNOSIS — E78.2 MIXED HYPERLIPIDEMIA: ICD-10-CM

## 2024-05-24 RX ORDER — SIMVASTATIN 20 MG
20 TABLET ORAL EVERY EVENING
Qty: 90 TABLET | Refills: 1 | Status: SHIPPED | OUTPATIENT
Start: 2024-05-24 | End: 2024-07-12

## 2024-05-24 NOTE — TELEPHONE ENCOUNTER
Prescription approved per [facility] Refill Protocol.  Jenni Gallagher, RN  [facility] Triage Nurse

## 2024-05-31 ENCOUNTER — OFFICE VISIT (OUTPATIENT)
Dept: FAMILY MEDICINE | Facility: CLINIC | Age: 69
End: 2024-05-31
Payer: MEDICARE

## 2024-05-31 VITALS
OXYGEN SATURATION: 98 % | SYSTOLIC BLOOD PRESSURE: 133 MMHG | WEIGHT: 132.8 LBS | HEIGHT: 59 IN | TEMPERATURE: 98.3 F | HEART RATE: 71 BPM | DIASTOLIC BLOOD PRESSURE: 77 MMHG | RESPIRATION RATE: 18 BRPM | BODY MASS INDEX: 26.77 KG/M2

## 2024-05-31 DIAGNOSIS — G47.33 OSA (OBSTRUCTIVE SLEEP APNEA): ICD-10-CM

## 2024-05-31 DIAGNOSIS — D32.9 MENINGIOMA (H): ICD-10-CM

## 2024-05-31 DIAGNOSIS — Z01.818 PREOP GENERAL PHYSICAL EXAM: Primary | ICD-10-CM

## 2024-05-31 DIAGNOSIS — I83.893 SYMPTOMATIC VARICOSE VEINS OF BOTH LOWER EXTREMITIES: ICD-10-CM

## 2024-05-31 DIAGNOSIS — I10 ESSENTIAL HYPERTENSION: ICD-10-CM

## 2024-05-31 DIAGNOSIS — E11.9 TYPE 2 DIABETES MELLITUS WITHOUT COMPLICATION, WITHOUT LONG-TERM CURRENT USE OF INSULIN (H): ICD-10-CM

## 2024-05-31 DIAGNOSIS — E03.9 ACQUIRED HYPOTHYROIDISM: ICD-10-CM

## 2024-05-31 LAB
ERYTHROCYTE [DISTWIDTH] IN BLOOD BY AUTOMATED COUNT: 13.4 % (ref 10–15)
HBA1C MFR BLD: 5.9 % (ref 0–5.6)
HCT VFR BLD AUTO: 41.9 % (ref 35–47)
HGB BLD-MCNC: 13.4 G/DL (ref 11.7–15.7)
MCH RBC QN AUTO: 26.1 PG (ref 26.5–33)
MCHC RBC AUTO-ENTMCNC: 32 G/DL (ref 31.5–36.5)
MCV RBC AUTO: 82 FL (ref 78–100)
PLATELET # BLD AUTO: 248 10E3/UL (ref 150–450)
RBC # BLD AUTO: 5.14 10E6/UL (ref 3.8–5.2)
WBC # BLD AUTO: 6 10E3/UL (ref 4–11)

## 2024-05-31 PROCEDURE — 82043 UR ALBUMIN QUANTITATIVE: CPT | Performed by: PREVENTIVE MEDICINE

## 2024-05-31 PROCEDURE — 99214 OFFICE O/P EST MOD 30 MIN: CPT | Performed by: PREVENTIVE MEDICINE

## 2024-05-31 PROCEDURE — 83036 HEMOGLOBIN GLYCOSYLATED A1C: CPT | Performed by: PREVENTIVE MEDICINE

## 2024-05-31 PROCEDURE — 36415 COLL VENOUS BLD VENIPUNCTURE: CPT | Performed by: PREVENTIVE MEDICINE

## 2024-05-31 PROCEDURE — 85027 COMPLETE CBC AUTOMATED: CPT | Performed by: PREVENTIVE MEDICINE

## 2024-05-31 PROCEDURE — 82570 ASSAY OF URINE CREATININE: CPT | Performed by: PREVENTIVE MEDICINE

## 2024-05-31 PROCEDURE — 80048 BASIC METABOLIC PNL TOTAL CA: CPT | Performed by: PREVENTIVE MEDICINE

## 2024-05-31 RX ORDER — RESPIRATORY SYNCYTIAL VIRUS VACCINE 120MCG/0.5
0.5 KIT INTRAMUSCULAR ONCE
Qty: 1 EACH | Refills: 0 | Status: CANCELLED | OUTPATIENT
Start: 2024-05-31 | End: 2024-05-31

## 2024-05-31 RX ORDER — ASPIRIN 81 MG/1
81 TABLET ORAL DAILY
COMMUNITY
Start: 2024-04-23

## 2024-05-31 ASSESSMENT — PAIN SCALES - GENERAL: PAINLEVEL: MILD PAIN (2)

## 2024-05-31 NOTE — H&P (VIEW-ONLY)
Preoperative Evaluation  [address] [address]  Phone: [phone]  Primary Provider: Angela Lacey DO  Pre-op Performing Provider: Ashlee Bazan MD  May 31, 2024             5/31/2024   Surgical Information   What procedure is being done? STAB PHLEBECTOMY, VARICOSE VEIN    Facility or Hospital where procedure/surgery will be performed: [facility]   Who is doing the procedure / surgery? Thierry Lobo MD    Date of surgery / procedure: 6/13/2024    Time of surgery / procedure: 7:00 AM    Where do you plan to recover after surgery? at home with family   Anesthesia: General   Fax number for surgical facility: Note does not need to be faxed, will be available electronically in Epic.    Assessment & Plan     The proposed surgical procedure is considered INTERMEDIATE risk.    Preop general physical exam  -procedure on 6/13/24  - CBC with platelets    Symptomatic varicose veins of both lower extremities  -Symptoms include pain, aching, fatigue, burning, edema, and dermatitis.   - CBC with platelets    Type 2 diabetes mellitus without complication, without long-term current use of insulin (H)  -on Metformin, having loose stools   - Albumin Random Urine Quantitative with Creat Ratio  - Basic metabolic panel  (Ca, Cl, CO2, Creat, Gluc, K, Na, BUN)  - Hemoglobin A1c    Lab Results   Component Value Date    A1C 6.7 03/29/2024    A1C 6.5 10/11/2023    A1C 6.8 11/09/2012         ANSHU (obstructive sleep apnea)  -not on CPAP currently  -has appointment with Sleep scheduled    Essential hypertension  -blood pressure is at goal   - Basic metabolic panel  (Ca, Cl, CO2, Creat, Gluc, K, Na, BUN)    Meningioma (H)  -per neurosurgery     Acquired hypothyroidism  -on Levothyroxine    TSH   Date Value Ref Range Status   03/29/2024 0.66 0.30 - 4.20 uIU/mL Final   03/24/2022 0.26 (L) 0.30 - 5.00 uIU/mL Final       Risks and  Recommendations  The patient has the following additional risks and recommendations for perioperative complications:  Obstructive Sleep Apnea:   -Sleep apnea+ not using CPAP     Antiplatelet or Anticoagulation Medication Instructions   - aspirin: Discontinue aspirin 7-10 days prior to procedure to reduce bleeding risk. It should be resumed postoperatively.     Additional Medication Instructions  Take all scheduled medications on the day of surgery EXCEPT for modifications listed below:   - Statins: Continue taking on the day of surgery.    - metformin: DO NOT TAKE day of surgery.  -Levothyroxine: continue say of surgery    - Antiepileptics: Continue without modification.   - celecoxib (Celebrex): DO NOT TAKE 3 days before surgery.     Recommendation  Approval given to proceed with proposed procedure, without further diagnostic evaluation.        Jenny Mares is a 68 year old, presenting for the following:  Pre-Op Exam          5/31/2024    10:59 AM   Additional Questions   Roomed by Jenni         5/31/2024    10:59 AM   Patient Reported Additional Medications   Patient reports taking the following new medications baby aspirin, celebrex     HPI related to upcoming procedure: 68 year old female with symptomatic varicose veins .  Symptoms include pain, aching, fatigue, burning, edema, and dermatitis. Patient has history of leg selling, pain and vein issues that have progressed. Pain and symptoms have affected daily living and work activities needing medications.         5/31/2024   Pre-Op Questionnaire   Have you ever had a heart attack or stroke? No   Have you ever had surgery on your heart or blood vessels, such as a stent placement, a coronary artery bypass, or surgery on an artery in your head, neck, heart, or legs? No   Do you have chest pain with activity? No   Do you have a history of heart failure? No   Do you currently have a cold, bronchitis or symptoms of other infection? No   Do you have a cough,  shortness of breath, or wheezing? No   Do you or anyone in your family have previous history of blood clots? No   Do you or does anyone in your family have a serious bleeding problem such as prolonged bleeding following surgeries or cuts? No   Have you ever had problems with anemia or been told to take iron pills? No   Have you had any abnormal blood loss such as black, tarry or bloody stools, or abnormal vaginal bleeding? No   Have you ever had a blood transfusion? No   Are you willing to have a blood transfusion if it is medically needed before, during, or after your surgery? Yes   Have you or any of your relatives ever had problems with anesthesia? No   Do you have sleep apnea, excessive snoring or daytime drowsiness? (!) YES, history of sleep apnea    Do you have a CPAP machine? (!) NO    Do you have any artifical heart valves or other implanted medical devices like a pacemaker, defibrillator, or continuous glucose monitor? No   Do you have artificial joints? No   Are you allergic to latex? No       No chest pain  No syncope  No pedal edema  Limited walking due to knee pain  Able to do home chores, able to do 4 METS.  Able to mow the lawn.    Health Care Directive  Patient does not have a Health Care Directive or Living Will: Patient states has Advance Directive and will bring in a copy to clinic.    Preoperative Review of    reviewed - no record of controlled substances prescribed.      Status of Chronic Conditions:  See problem list for active medical problems.  Problems all longstanding and stable, except as noted/documented.  See ROS for pertinent symptoms related to these conditions.    Patient Active Problem List    Diagnosis Date Noted    Symptomatic varicose veins of both lower extremities 02/21/2024     Priority: Medium    Diabetes mellitus, type 2 (H) 10/12/2023     Priority: Medium    Benign neoplasm of cerebral meninges (H) 09/16/2022     Priority: Medium     Formatting of this note might be  different from the original.  Created by Conversion  [facility] Annotation: Oct 29 2007  4:02PM - Lamont Kapadia: s/p craniotomy   and removal of multiple meningioma regrowths, 11/12/12      Carpal tunnel syndrome 09/16/2022     Priority: Medium     Formatting of this note might be different from the original.  Created by Conversion      Disorder of bone and cartilage 09/16/2022     Priority: Medium     Formatting of this note might be different from the original.  Created by Conversion    Replacement Utility updated for latest IMO load      Hereditary and idiopathic peripheral neuropathy 09/16/2022     Priority: Medium     Formatting of this note might be different from the original.  Created by Conversion    Replacement Utility updated for latest IMO load      Acute pain of left knee 07/28/2021     Priority: Medium    Mixed hyperlipidemia 07/28/2021     Priority: Medium    Essential hypertension 07/28/2021     Priority: Medium    Acquired hypothyroidism 07/28/2021     Priority: Medium    Meningioma (H) 07/28/2021     Priority: Medium    Cooper's neuroma 06/04/2018     Priority: Medium    Allergic rhinitis 10/01/2015     Priority: Medium    GERD (gastroesophageal reflux disease) 10/01/2015     Priority: Medium    Seizure disorder (H) 06/11/2015     Priority: Medium    Osteoarthritis of right knee 06/11/2015     Priority: Medium    Partial epilepsy with impairment of consciousness, intractable (H) 09/09/2014     Priority: Medium     Seizures related to multiple meningiomas, last presenting Nov 2012 with 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 Also possible right arm sensory seizures. Seizures broke thru LEV 1500/d Nov 2012 in context of meningioma recurrence. Meningiomas resected and /d added which patient did not tolerate so changed to LEV 2000/d which is max she can tolerate. Seizures broke through maximally tolerated levetiracetam so transitioned to lamotrigine and increased to 300  mg/d with initial control of seizures.          Past Medical History:   Diagnosis Date    Disorder of thyroid     History of meningioma     causes seizures    Hyperlipidemia     Hypertension     Seizures (H)      Past Surgical History:   Procedure Laterality Date    CARPAL TUNNEL RELEASE RT/LT Bilateral     CHOLECYSTECTOMY, LAPOROSCOPIC      CRANIOTOMY  1995    CRANIOTOMY  2012     Current Outpatient Medications   Medication Sig Dispense Refill    acetaminophen (TYLENOL ARTHRITIS PAIN) 650 MG CR tablet Take 650 mg by mouth as needed.      ALPRAZolam (XANAX) 0.25 MG tablet Take 1 tablet (0.25 mg) by mouth daily as needed for anxiety 8 tablet 0    amLODIPine (NORVASC) 10 MG tablet TAKE 1 TABLETS(10 MG) BY MOUTH DAILY 90 tablet 3    aspirin 81 MG EC tablet Take 81 mg by mouth daily      Blood Pressure Monitoring (OMRON 5 SERIES BP MONITOR) ADRIENNE 1 each by Other route daily      Calcium Carbonate-Vitamin D (CALCIUM 500 + D PO) Take 1 tablet by mouth daily.      fluticasone (FLONASE) 50 MCG/ACT spray USE 1 SPRAY IN EACH NOSTRIL EVERY DAY; SHAKE BEFORE USE      levothyroxine (SYNTHROID/LEVOTHROID) 88 MCG tablet Take 1 tablet (88 mcg) by mouth daily 90 tablet 3    meclizine 25 MG CHEW Take 25 mg by mouth every 6 hours as needed for dizziness      metFORMIN (GLUCOPHAGE XR) 500 MG 24 hr tablet Take 1 tablet (500 mg) by mouth 2 times daily (with meals) 180 tablet 0    simvastatin (ZOCOR) 20 MG tablet Take 1 tablet (20 mg) by mouth every evening 90 tablet 1    lamoTRIgine (LAMICTAL) 100 MG tablet Take one and one half tablets twice per day (total 300 mg per day) (Patient not taking: Reported on 5/31/2024) 270 tablet 1       Allergies   Allergen Reactions    Codeine Nausea and Vomiting    No Clinical Screening - See Comments      Fast injection of Contrast dye= Dizziness, nausea.     Tylenol With Codeine [Acetaminophen-Codeine] Nausea     Dizziness        Social History     Tobacco Use    Smoking status: Never     Passive  "exposure: Never    Smokeless tobacco: Never   Substance Use Topics    Alcohol use: Yes     Comment: rarely     Family History   Problem Relation Age of Onset    Prostate Cancer Father      History   Drug Use Unknown             Review of Systems  Constitutional, HEENT, cardiovascular, pulmonary, gi and gu systems are negative, except as otherwise noted.    Objective    /77 (BP Location: Left arm, Patient Position: Sitting, Cuff Size: Adult Regular)   Pulse 71   Temp 98.3  F (36.8  C) (Oral)   Resp 18   Ht 1.494 m (4' 10.82\")   Wt 60.2 kg (132 lb 12.8 oz)   LMP  (LMP Unknown)   SpO2 98%   BMI 26.99 kg/m     Estimated body mass index is 26.99 kg/m  as calculated from the following:    Height as of this encounter: 1.494 m (4' 10.82\").    Weight as of this encounter: 60.2 kg (132 lb 12.8 oz).  Physical Exam  GENERAL APPEARANCE: healthy, alert and no distress  EYES: Eyes grossly normal to inspection and conjunctivae and sclerae normal  HENT: mouth without ulcers or lesions, dentures+  NECK: no adenopathy and trachea midline and normal to palpation, I did not hear any carotid bruits   RESP: lungs clear to auscultation - no rales, rhonchi or wheezes  CV: regular rates and rhythm, normal S1 S2, no S3 or S4 and no murmur, click or rub  ABDOMEN: soft, non-tender and no rebound or guarding   MS: extremities normal- no gross deformities noted and peripheral pulses normal, varicose veins+   SKIN: no suspicious lesions or rashes  NEURO: Normal strength and tone, mentation intact and speech normal  PSYCH: mentation appears normal      Recent Labs   Lab Test 03/29/24  1502 12/18/23  1320 11/08/23  1006 10/11/23  1302   HGB 14.7  --   --   --      --   --   --     140   < > 142   POTASSIUM 3.2* 3.8   < > 3.1*   CR 0.49* 0.60   < > 0.41*   A1C 6.7*  --   --  6.5*    < > = values in this interval not displayed.        Diagnostics  Labs pending at this time.  Results will be reviewed when available.   No EKG " this visit, completed in the last 90 days.    Revised Cardiac Risk Index (RCRI)  The patient has the following serious cardiovascular risks for perioperative complications:   - No serious cardiac risks = 0 points     RCRI Interpretation: 0 points: Class I (very low risk - 0.4% complication rate)         Signed Electronically by: Ashlee Bazan MD MPH    Copy of this evaluation report is provided to requesting physician.

## 2024-05-31 NOTE — PROGRESS NOTES
Preoperative Evaluation  [address] [address]  Phone: [phone]  Primary Provider: Angela Lacey DO  Pre-op Performing Provider: Ashlee Bazan MD  May 31, 2024             5/31/2024   Surgical Information   What procedure is being done? STAB PHLEBECTOMY, VARICOSE VEIN    Facility or Hospital where procedure/surgery will be performed: [facility]   Who is doing the procedure / surgery? Thierry Lobo MD    Date of surgery / procedure: 6/13/2024    Time of surgery / procedure: 7:00 AM    Where do you plan to recover after surgery? at home with family   Anesthesia: General   Fax number for surgical facility: Note does not need to be faxed, will be available electronically in Epic.    Assessment & Plan     The proposed surgical procedure is considered INTERMEDIATE risk.    Preop general physical exam  -procedure on 6/13/24  - CBC with platelets    Symptomatic varicose veins of both lower extremities  -Symptoms include pain, aching, fatigue, burning, edema, and dermatitis.   - CBC with platelets    Type 2 diabetes mellitus without complication, without long-term current use of insulin (H)  -on Metformin, having loose stools   - Albumin Random Urine Quantitative with Creat Ratio  - Basic metabolic panel  (Ca, Cl, CO2, Creat, Gluc, K, Na, BUN)  - Hemoglobin A1c    Lab Results   Component Value Date    A1C 6.7 03/29/2024    A1C 6.5 10/11/2023    A1C 6.8 11/09/2012         ANSHU (obstructive sleep apnea)  -not on CPAP currently  -has appointment with Sleep scheduled    Essential hypertension  -blood pressure is at goal   - Basic metabolic panel  (Ca, Cl, CO2, Creat, Gluc, K, Na, BUN)    Meningioma (H)  -per neurosurgery     Acquired hypothyroidism  -on Levothyroxine    TSH   Date Value Ref Range Status   03/29/2024 0.66 0.30 - 4.20 uIU/mL Final   03/24/2022 0.26 (L) 0.30 - 5.00 uIU/mL Final       Risks and  Recommendations  The patient has the following additional risks and recommendations for perioperative complications:  Obstructive Sleep Apnea:   -Sleep apnea+ not using CPAP     Antiplatelet or Anticoagulation Medication Instructions   - aspirin: Discontinue aspirin 7-10 days prior to procedure to reduce bleeding risk. It should be resumed postoperatively.     Additional Medication Instructions  Take all scheduled medications on the day of surgery EXCEPT for modifications listed below:   - Statins: Continue taking on the day of surgery.    - metformin: DO NOT TAKE day of surgery.  -Levothyroxine: continue say of surgery    - Antiepileptics: Continue without modification.   - celecoxib (Celebrex): DO NOT TAKE 3 days before surgery.     Recommendation  Approval given to proceed with proposed procedure, without further diagnostic evaluation.        Jenny Mares is a 68 year old, presenting for the following:  Pre-Op Exam          5/31/2024    10:59 AM   Additional Questions   Roomed by Jenni         5/31/2024    10:59 AM   Patient Reported Additional Medications   Patient reports taking the following new medications baby aspirin, celebrex     HPI related to upcoming procedure: 68 year old female with symptomatic varicose veins .  Symptoms include pain, aching, fatigue, burning, edema, and dermatitis. Patient has history of leg selling, pain and vein issues that have progressed. Pain and symptoms have affected daily living and work activities needing medications.         5/31/2024   Pre-Op Questionnaire   Have you ever had a heart attack or stroke? No   Have you ever had surgery on your heart or blood vessels, such as a stent placement, a coronary artery bypass, or surgery on an artery in your head, neck, heart, or legs? No   Do you have chest pain with activity? No   Do you have a history of heart failure? No   Do you currently have a cold, bronchitis or symptoms of other infection? No   Do you have a cough,  shortness of breath, or wheezing? No   Do you or anyone in your family have previous history of blood clots? No   Do you or does anyone in your family have a serious bleeding problem such as prolonged bleeding following surgeries or cuts? No   Have you ever had problems with anemia or been told to take iron pills? No   Have you had any abnormal blood loss such as black, tarry or bloody stools, or abnormal vaginal bleeding? No   Have you ever had a blood transfusion? No   Are you willing to have a blood transfusion if it is medically needed before, during, or after your surgery? Yes   Have you or any of your relatives ever had problems with anesthesia? No   Do you have sleep apnea, excessive snoring or daytime drowsiness? (!) YES, history of sleep apnea    Do you have a CPAP machine? (!) NO    Do you have any artifical heart valves or other implanted medical devices like a pacemaker, defibrillator, or continuous glucose monitor? No   Do you have artificial joints? No   Are you allergic to latex? No       No chest pain  No syncope  No pedal edema  Limited walking due to knee pain  Able to do home chores, able to do 4 METS.  Able to mow the lawn.    Health Care Directive  Patient does not have a Health Care Directive or Living Will: Patient states has Advance Directive and will bring in a copy to clinic.    Preoperative Review of    reviewed - no record of controlled substances prescribed.      Status of Chronic Conditions:  See problem list for active medical problems.  Problems all longstanding and stable, except as noted/documented.  See ROS for pertinent symptoms related to these conditions.    Patient Active Problem List    Diagnosis Date Noted    Symptomatic varicose veins of both lower extremities 02/21/2024     Priority: Medium    Diabetes mellitus, type 2 (H) 10/12/2023     Priority: Medium    Benign neoplasm of cerebral meninges (H) 09/16/2022     Priority: Medium     Formatting of this note might be  different from the original.  Created by Conversion  [facility] Annotation: Oct 29 2007  4:02PM - Lamont Kapadia: s/p craniotomy   and removal of multiple meningioma regrowths, 11/12/12      Carpal tunnel syndrome 09/16/2022     Priority: Medium     Formatting of this note might be different from the original.  Created by Conversion      Disorder of bone and cartilage 09/16/2022     Priority: Medium     Formatting of this note might be different from the original.  Created by Conversion    Replacement Utility updated for latest IMO load      Hereditary and idiopathic peripheral neuropathy 09/16/2022     Priority: Medium     Formatting of this note might be different from the original.  Created by Conversion    Replacement Utility updated for latest IMO load      Acute pain of left knee 07/28/2021     Priority: Medium    Mixed hyperlipidemia 07/28/2021     Priority: Medium    Essential hypertension 07/28/2021     Priority: Medium    Acquired hypothyroidism 07/28/2021     Priority: Medium    Meningioma (H) 07/28/2021     Priority: Medium    Cooper's neuroma 06/04/2018     Priority: Medium    Allergic rhinitis 10/01/2015     Priority: Medium    GERD (gastroesophageal reflux disease) 10/01/2015     Priority: Medium    Seizure disorder (H) 06/11/2015     Priority: Medium    Osteoarthritis of right knee 06/11/2015     Priority: Medium    Partial epilepsy with impairment of consciousness, intractable (H) 09/09/2014     Priority: Medium     Seizures related to multiple meningiomas, last presenting Nov 2012 with 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 Also possible right arm sensory seizures. Seizures broke thru LEV 1500/d Nov 2012 in context of meningioma recurrence. Meningiomas resected and /d added which patient did not tolerate so changed to LEV 2000/d which is max she can tolerate. Seizures broke through maximally tolerated levetiracetam so transitioned to lamotrigine and increased to 300  mg/d with initial control of seizures.          Past Medical History:   Diagnosis Date    Disorder of thyroid     History of meningioma     causes seizures    Hyperlipidemia     Hypertension     Seizures (H)      Past Surgical History:   Procedure Laterality Date    CARPAL TUNNEL RELEASE RT/LT Bilateral     CHOLECYSTECTOMY, LAPOROSCOPIC      CRANIOTOMY  1995    CRANIOTOMY  2012     Current Outpatient Medications   Medication Sig Dispense Refill    acetaminophen (TYLENOL ARTHRITIS PAIN) 650 MG CR tablet Take 650 mg by mouth as needed.      ALPRAZolam (XANAX) 0.25 MG tablet Take 1 tablet (0.25 mg) by mouth daily as needed for anxiety 8 tablet 0    amLODIPine (NORVASC) 10 MG tablet TAKE 1 TABLETS(10 MG) BY MOUTH DAILY 90 tablet 3    aspirin 81 MG EC tablet Take 81 mg by mouth daily      Blood Pressure Monitoring (OMRON 5 SERIES BP MONITOR) ADRIENNE 1 each by Other route daily      Calcium Carbonate-Vitamin D (CALCIUM 500 + D PO) Take 1 tablet by mouth daily.      fluticasone (FLONASE) 50 MCG/ACT spray USE 1 SPRAY IN EACH NOSTRIL EVERY DAY; SHAKE BEFORE USE      levothyroxine (SYNTHROID/LEVOTHROID) 88 MCG tablet Take 1 tablet (88 mcg) by mouth daily 90 tablet 3    meclizine 25 MG CHEW Take 25 mg by mouth every 6 hours as needed for dizziness      metFORMIN (GLUCOPHAGE XR) 500 MG 24 hr tablet Take 1 tablet (500 mg) by mouth 2 times daily (with meals) 180 tablet 0    simvastatin (ZOCOR) 20 MG tablet Take 1 tablet (20 mg) by mouth every evening 90 tablet 1    lamoTRIgine (LAMICTAL) 100 MG tablet Take one and one half tablets twice per day (total 300 mg per day) (Patient not taking: Reported on 5/31/2024) 270 tablet 1       Allergies   Allergen Reactions    Codeine Nausea and Vomiting    No Clinical Screening - See Comments      Fast injection of Contrast dye= Dizziness, nausea.     Tylenol With Codeine [Acetaminophen-Codeine] Nausea     Dizziness        Social History     Tobacco Use    Smoking status: Never     Passive  "exposure: Never    Smokeless tobacco: Never   Substance Use Topics    Alcohol use: Yes     Comment: rarely     Family History   Problem Relation Age of Onset    Prostate Cancer Father      History   Drug Use Unknown             Review of Systems  Constitutional, HEENT, cardiovascular, pulmonary, gi and gu systems are negative, except as otherwise noted.    Objective    /77 (BP Location: Left arm, Patient Position: Sitting, Cuff Size: Adult Regular)   Pulse 71   Temp 98.3  F (36.8  C) (Oral)   Resp 18   Ht 1.494 m (4' 10.82\")   Wt 60.2 kg (132 lb 12.8 oz)   LMP  (LMP Unknown)   SpO2 98%   BMI 26.99 kg/m     Estimated body mass index is 26.99 kg/m  as calculated from the following:    Height as of this encounter: 1.494 m (4' 10.82\").    Weight as of this encounter: 60.2 kg (132 lb 12.8 oz).  Physical Exam  GENERAL APPEARANCE: healthy, alert and no distress  EYES: Eyes grossly normal to inspection and conjunctivae and sclerae normal  HENT: mouth without ulcers or lesions, dentures+  NECK: no adenopathy and trachea midline and normal to palpation, I did not hear any carotid bruits   RESP: lungs clear to auscultation - no rales, rhonchi or wheezes  CV: regular rates and rhythm, normal S1 S2, no S3 or S4 and no murmur, click or rub  ABDOMEN: soft, non-tender and no rebound or guarding   MS: extremities normal- no gross deformities noted and peripheral pulses normal, varicose veins+   SKIN: no suspicious lesions or rashes  NEURO: Normal strength and tone, mentation intact and speech normal  PSYCH: mentation appears normal      Recent Labs   Lab Test 03/29/24  1502 12/18/23  1320 11/08/23  1006 10/11/23  1302   HGB 14.7  --   --   --      --   --   --     140   < > 142   POTASSIUM 3.2* 3.8   < > 3.1*   CR 0.49* 0.60   < > 0.41*   A1C 6.7*  --   --  6.5*    < > = values in this interval not displayed.        Diagnostics  Labs pending at this time.  Results will be reviewed when available.   No EKG " this visit, completed in the last 90 days.    Revised Cardiac Risk Index (RCRI)  The patient has the following serious cardiovascular risks for perioperative complications:   - No serious cardiac risks = 0 points     RCRI Interpretation: 0 points: Class I (very low risk - 0.4% complication rate)         Signed Electronically by: Ashlee Bazan MD MPH    Copy of this evaluation report is provided to requesting physician.

## 2024-05-31 NOTE — RESULT ENCOUNTER NOTE
Suzan,     Complete blood count is not showing anemia or infection.  Three month glucose number Hemoglobin A1C has improved from 6.7 to 5.9 and is at goal.     Please do not hesitate to call us at [phone] if you have any questions or concerns.    Thank you,    Ashlee Bazan MD MPH

## 2024-05-31 NOTE — PATIENT INSTRUCTIONS
- Statins Simvastatin: Continue taking on the day of surgery.    - metformin: DO NOT TAKE day of surgery.  -Levothyroxine: continue say of surgery    - celecoxib (Celebrex): DO NOT TAKE 3 days before surgery.   -Aspirin stop 7 days before surgery  -Avoid Xanax day of surgery     Avoid over the counter medications like Advil, Aleve, Ibuprofen 7 days before procedure.

## 2024-06-02 LAB
ANION GAP SERPL CALCULATED.3IONS-SCNC: 11 MMOL/L (ref 7–15)
BUN SERPL-MCNC: 19.4 MG/DL (ref 8–23)
CALCIUM SERPL-MCNC: 9.7 MG/DL (ref 8.8–10.2)
CHLORIDE SERPL-SCNC: 106 MMOL/L (ref 98–107)
CREAT SERPL-MCNC: 0.5 MG/DL (ref 0.51–0.95)
CREAT UR-MCNC: 194 MG/DL
DEPRECATED HCO3 PLAS-SCNC: 25 MMOL/L (ref 22–29)
EGFRCR SERPLBLD CKD-EPI 2021: >90 ML/MIN/1.73M2
GLUCOSE SERPL-MCNC: 96 MG/DL (ref 70–99)
MICROALBUMIN UR-MCNC: 75.1 MG/L
MICROALBUMIN/CREAT UR: 38.71 MG/G CR (ref 0–25)
POTASSIUM SERPL-SCNC: 3.7 MMOL/L (ref 3.4–5.3)
SODIUM SERPL-SCNC: 142 MMOL/L (ref 135–145)

## 2024-06-03 NOTE — RESULT ENCOUNTER NOTE
Suzan,     Electrolytes, glucose and kidney function are normal.  Urine sample is showing a small amount of abnormal protein.   Plan to recheck this in 6 months.     Please do not hesitate to call us at [phone] if you have any questions or concerns.    Thank you,    Ashlee Bazan MD MPH

## 2024-06-11 RX ORDER — CELECOXIB 100 MG/1
100 CAPSULE ORAL DAILY
COMMUNITY
End: 2024-07-12

## 2024-06-12 ENCOUNTER — ANESTHESIA EVENT (OUTPATIENT)
Dept: SURGERY | Facility: AMBULATORY SURGERY CENTER | Age: 69
End: 2024-06-12
Payer: MEDICARE

## 2024-06-13 ENCOUNTER — HOSPITAL ENCOUNTER (OUTPATIENT)
Facility: AMBULATORY SURGERY CENTER | Age: 69
Discharge: HOME | End: 2024-06-13
Attending: SPECIALIST
Payer: MEDICARE

## 2024-06-13 ENCOUNTER — ANESTHESIA (OUTPATIENT)
Dept: SURGERY | Facility: AMBULATORY SURGERY CENTER | Age: 69
End: 2024-06-13
Payer: MEDICARE

## 2024-06-13 VITALS
DIASTOLIC BLOOD PRESSURE: 55 MMHG | BODY MASS INDEX: 26.61 KG/M2 | HEART RATE: 66 BPM | SYSTOLIC BLOOD PRESSURE: 109 MMHG | HEIGHT: 59 IN | RESPIRATION RATE: 16 BRPM | TEMPERATURE: 97 F | WEIGHT: 132 LBS | OXYGEN SATURATION: 94 %

## 2024-06-13 DIAGNOSIS — I83.893 SYMPTOMATIC VARICOSE VEINS OF BOTH LOWER EXTREMITIES: Primary | ICD-10-CM

## 2024-06-13 LAB — GLUCOSE POCT: 108 MG/DL (ref 70–99)

## 2024-06-13 PROCEDURE — 37765 STAB PHLEB VEINS XTR 10-20: CPT | Mod: LT | Performed by: SPECIALIST

## 2024-06-13 RX ORDER — CEFAZOLIN SODIUM 2 G/100ML
2 INJECTION, SOLUTION INTRAVENOUS SEE ADMIN INSTRUCTIONS
Status: DISCONTINUED | OUTPATIENT
Start: 2024-06-13 | End: 2024-06-14 | Stop reason: HOSPADM

## 2024-06-13 RX ORDER — HYDROCODONE BITARTRATE AND ACETAMINOPHEN 5; 325 MG/1; MG/1
1-2 TABLET ORAL EVERY 6 HOURS PRN
Qty: 18 TABLET | Refills: 0 | Status: SHIPPED | OUTPATIENT
Start: 2024-06-13 | End: 2024-06-16

## 2024-06-13 RX ORDER — CEFAZOLIN SODIUM 2 G/100ML
2 INJECTION, SOLUTION INTRAVENOUS
Status: COMPLETED | OUTPATIENT
Start: 2024-06-13 | End: 2024-06-13

## 2024-06-13 RX ORDER — BUPIVACAINE HYDROCHLORIDE AND EPINEPHRINE 2.5; 5 MG/ML; UG/ML
INJECTION, SOLUTION INFILTRATION; PERINEURAL PRN
Status: DISCONTINUED | OUTPATIENT
Start: 2024-06-13 | End: 2024-06-13 | Stop reason: HOSPADM

## 2024-06-13 RX ORDER — NALOXONE HYDROCHLORIDE 0.4 MG/ML
0.1 INJECTION, SOLUTION INTRAMUSCULAR; INTRAVENOUS; SUBCUTANEOUS
Status: DISCONTINUED | OUTPATIENT
Start: 2024-06-13 | End: 2024-06-14 | Stop reason: HOSPADM

## 2024-06-13 RX ORDER — LABETALOL 20 MG/4 ML (5 MG/ML) INTRAVENOUS SYRINGE
5 EVERY 10 MIN PRN
Status: DISCONTINUED | OUTPATIENT
Start: 2024-06-13 | End: 2024-06-14 | Stop reason: HOSPADM

## 2024-06-13 RX ORDER — FENTANYL CITRATE-0.9 % NACL/PF 10 MCG/ML
100 PLASTIC BAG, INJECTION (ML) INTRAVENOUS EVERY 5 MIN PRN
Status: DISCONTINUED | OUTPATIENT
Start: 2024-06-13 | End: 2024-06-14 | Stop reason: HOSPADM

## 2024-06-13 RX ORDER — GLYCOPYRROLATE 0.2 MG/ML
INJECTION, SOLUTION INTRAMUSCULAR; INTRAVENOUS PRN
Status: DISCONTINUED | OUTPATIENT
Start: 2024-06-13 | End: 2024-06-13

## 2024-06-13 RX ORDER — SODIUM CHLORIDE, SODIUM LACTATE, POTASSIUM CHLORIDE, CALCIUM CHLORIDE 600; 310; 30; 20 MG/100ML; MG/100ML; MG/100ML; MG/100ML
INJECTION, SOLUTION INTRAVENOUS CONTINUOUS
Status: DISCONTINUED | OUTPATIENT
Start: 2024-06-13 | End: 2024-06-14 | Stop reason: HOSPADM

## 2024-06-13 RX ORDER — HYDROMORPHONE HCL IN WATER/PF 6 MG/30 ML
0.4 PATIENT CONTROLLED ANALGESIA SYRINGE INTRAVENOUS EVERY 5 MIN PRN
Status: DISCONTINUED | OUTPATIENT
Start: 2024-06-13 | End: 2024-06-14 | Stop reason: HOSPADM

## 2024-06-13 RX ORDER — PROPOFOL 10 MG/ML
INJECTION, EMULSION INTRAVENOUS CONTINUOUS PRN
Status: DISCONTINUED | OUTPATIENT
Start: 2024-06-13 | End: 2024-06-13

## 2024-06-13 RX ORDER — KETOROLAC TROMETHAMINE 30 MG/ML
INJECTION, SOLUTION INTRAMUSCULAR; INTRAVENOUS PRN
Status: DISCONTINUED | OUTPATIENT
Start: 2024-06-13 | End: 2024-06-13

## 2024-06-13 RX ORDER — ONDANSETRON 4 MG/1
4 TABLET, ORALLY DISINTEGRATING ORAL EVERY 30 MIN PRN
Status: DISCONTINUED | OUTPATIENT
Start: 2024-06-13 | End: 2024-06-14 | Stop reason: HOSPADM

## 2024-06-13 RX ORDER — FENTANYL CITRATE 0.05 MG/ML
25 INJECTION, SOLUTION INTRAMUSCULAR; INTRAVENOUS
Status: DISCONTINUED | OUTPATIENT
Start: 2024-06-13 | End: 2024-06-14 | Stop reason: HOSPADM

## 2024-06-13 RX ORDER — DEXAMETHASONE SODIUM PHOSPHATE 4 MG/ML
4 INJECTION, SOLUTION INTRA-ARTICULAR; INTRALESIONAL; INTRAMUSCULAR; INTRAVENOUS; SOFT TISSUE
Status: DISCONTINUED | OUTPATIENT
Start: 2024-06-13 | End: 2024-06-14 | Stop reason: HOSPADM

## 2024-06-13 RX ORDER — FENTANYL CITRATE-0.9 % NACL/PF 10 MCG/ML
200 PLASTIC BAG, INJECTION (ML) INTRAVENOUS ONCE
Status: COMPLETED | OUTPATIENT
Start: 2024-06-13 | End: 2024-06-13

## 2024-06-13 RX ORDER — ONDANSETRON 2 MG/ML
4 INJECTION INTRAMUSCULAR; INTRAVENOUS EVERY 30 MIN PRN
Status: DISCONTINUED | OUTPATIENT
Start: 2024-06-13 | End: 2024-06-14 | Stop reason: HOSPADM

## 2024-06-13 RX ORDER — ONDANSETRON 2 MG/ML
INJECTION INTRAMUSCULAR; INTRAVENOUS PRN
Status: DISCONTINUED | OUTPATIENT
Start: 2024-06-13 | End: 2024-06-13

## 2024-06-13 RX ORDER — OXYCODONE HYDROCHLORIDE 10 MG/1
10 TABLET ORAL
Status: DISCONTINUED | OUTPATIENT
Start: 2024-06-13 | End: 2024-06-14 | Stop reason: HOSPADM

## 2024-06-13 RX ORDER — DEXAMETHASONE SODIUM PHOSPHATE 10 MG/ML
INJECTION, SOLUTION INTRAMUSCULAR; INTRAVENOUS PRN
Status: DISCONTINUED | OUTPATIENT
Start: 2024-06-13 | End: 2024-06-13

## 2024-06-13 RX ORDER — PROPOFOL 10 MG/ML
INJECTION, EMULSION INTRAVENOUS PRN
Status: DISCONTINUED | OUTPATIENT
Start: 2024-06-13 | End: 2024-06-13

## 2024-06-13 RX ORDER — ACETAMINOPHEN 325 MG/1
975 TABLET ORAL ONCE
Status: COMPLETED | OUTPATIENT
Start: 2024-06-13 | End: 2024-06-13

## 2024-06-13 RX ORDER — LIDOCAINE HYDROCHLORIDE 20 MG/ML
INJECTION, SOLUTION INFILTRATION; PERINEURAL PRN
Status: DISCONTINUED | OUTPATIENT
Start: 2024-06-13 | End: 2024-06-13

## 2024-06-13 RX ORDER — ACETAMINOPHEN 325 MG/1
650 TABLET ORAL ONCE
Status: DISCONTINUED | OUTPATIENT
Start: 2024-06-13 | End: 2024-06-14 | Stop reason: HOSPADM

## 2024-06-13 RX ORDER — LIDOCAINE 40 MG/G
CREAM TOPICAL
Status: DISCONTINUED | OUTPATIENT
Start: 2024-06-13 | End: 2024-06-14 | Stop reason: HOSPADM

## 2024-06-13 RX ORDER — FENTANYL CITRATE 50 UG/ML
INJECTION, SOLUTION INTRAMUSCULAR; INTRAVENOUS PRN
Status: DISCONTINUED | OUTPATIENT
Start: 2024-06-13 | End: 2024-06-13

## 2024-06-13 RX ORDER — HYDROMORPHONE HCL IN WATER/PF 6 MG/30 ML
0.2 PATIENT CONTROLLED ANALGESIA SYRINGE INTRAVENOUS EVERY 5 MIN PRN
Status: DISCONTINUED | OUTPATIENT
Start: 2024-06-13 | End: 2024-06-14 | Stop reason: HOSPADM

## 2024-06-13 RX ORDER — OXYCODONE HYDROCHLORIDE 5 MG/1
5 TABLET ORAL
Status: DISCONTINUED | OUTPATIENT
Start: 2024-06-13 | End: 2024-06-14 | Stop reason: HOSPADM

## 2024-06-13 RX ADMIN — SODIUM CHLORIDE, SODIUM LACTATE, POTASSIUM CHLORIDE, CALCIUM CHLORIDE: 600; 310; 30; 20 INJECTION, SOLUTION INTRAVENOUS at 08:17

## 2024-06-13 RX ADMIN — PROPOFOL 100 MG: 10 INJECTION, EMULSION INTRAVENOUS at 07:17

## 2024-06-13 RX ADMIN — ONDANSETRON 4 MG: 2 INJECTION INTRAMUSCULAR; INTRAVENOUS at 07:37

## 2024-06-13 RX ADMIN — DEXAMETHASONE SODIUM PHOSPHATE 8 MG: 10 INJECTION, SOLUTION INTRAMUSCULAR; INTRAVENOUS at 07:17

## 2024-06-13 RX ADMIN — SODIUM CHLORIDE, SODIUM LACTATE, POTASSIUM CHLORIDE, CALCIUM CHLORIDE: 600; 310; 30; 20 INJECTION, SOLUTION INTRAVENOUS at 06:54

## 2024-06-13 RX ADMIN — ACETAMINOPHEN 975 MG: 325 TABLET ORAL at 06:32

## 2024-06-13 RX ADMIN — Medication 100 MCG: at 07:48

## 2024-06-13 RX ADMIN — Medication 100 MCG: at 07:38

## 2024-06-13 RX ADMIN — FENTANYL CITRATE 50 MCG: 50 INJECTION, SOLUTION INTRAMUSCULAR; INTRAVENOUS at 07:17

## 2024-06-13 RX ADMIN — FENTANYL CITRATE 50 MCG: 50 INJECTION, SOLUTION INTRAMUSCULAR; INTRAVENOUS at 07:36

## 2024-06-13 RX ADMIN — PROPOFOL 200 MCG/KG/MIN: 10 INJECTION, EMULSION INTRAVENOUS at 07:17

## 2024-06-13 RX ADMIN — Medication 100 MCG: at 07:23

## 2024-06-13 RX ADMIN — GLYCOPYRROLATE 0.2 MG: 0.2 INJECTION, SOLUTION INTRAMUSCULAR; INTRAVENOUS at 07:17

## 2024-06-13 RX ADMIN — Medication 200 MCG: at 08:36

## 2024-06-13 RX ADMIN — Medication 100 MCG: at 07:56

## 2024-06-13 RX ADMIN — Medication 100 MCG: at 07:57

## 2024-06-13 RX ADMIN — LIDOCAINE HYDROCHLORIDE 3 ML: 20 INJECTION, SOLUTION INFILTRATION; PERINEURAL at 07:17

## 2024-06-13 RX ADMIN — Medication 100 MCG: at 07:17

## 2024-06-13 RX ADMIN — KETOROLAC TROMETHAMINE 15 MG: 30 INJECTION, SOLUTION INTRAMUSCULAR; INTRAVENOUS at 07:46

## 2024-06-13 RX ADMIN — CEFAZOLIN SODIUM 2 G: 2 INJECTION, SOLUTION INTRAVENOUS at 07:10

## 2024-06-13 RX ADMIN — Medication 200 MCG: at 08:40

## 2024-06-13 ASSESSMENT — ENCOUNTER SYMPTOMS: SEIZURES: 1

## 2024-06-13 NOTE — ANESTHESIA CARE TRANSFER NOTE
Patient: Suzan Ballard    Procedure: Procedure(s):  STAB PHLEBECTOMY, VARICOSE VEIN       Diagnosis: Symptomatic varicose veins of both lower extremities [I83.893]  Diagnosis Additional Information: No value filed.    Anesthesia Type:   General     Note:    Oropharynx: oral airway in place and spontaneously breathing  Level of Consciousness: drowsy  Oxygen Supplementation: face mask  Level of Supplemental Oxygen (L/min / FiO2): 10  Independent Airway: airway patency satisfactory and stable  Dentition: dentition unchanged  Vital Signs Stable: post-procedure vital signs reviewed and stable  Report to RN Given: handoff report given  Patient transferred to: PACU    Handoff Report: Identifed the Patient, Identified the Reponsible Provider, Reviewed the pertinent medical history, Discussed the surgical course, Reviewed Intra-OP anesthesia mangement and issues during anesthesia, Set expectations for post-procedure period and Allowed opportunity for questions and acknowledgement of understanding      Vitals:  Vitals Value Taken Time   BP 91/55 06/13/24 0803   Temp 97  F (36.1  C) 06/13/24 0803   Pulse 50 06/13/24 0803   Resp 16 06/13/24 0803   SpO2 100 06/13/24 0803       Electronically Signed By: AHMET Hendrickson CRNA  June 13, 2024  8:05 AM

## 2024-06-13 NOTE — OP NOTE
Operative Note    Name:  Suzan SOLO Elio  PCP:  Angela Lacey  Procedure Date:  6/13/2024      Procedure(s):  STAB PHLEBECTOMY, VARICOSE VEIN left    Pre-Procedure Diagnosis:  Symptomatic varicose veins of both lower extremities [I83.893]    Post-Procedure Diagnosis:    symptomatic varicose veins    OR staff:  Surgeon(s):  Thierry Lobo MD    Circulator: Anisa Obrien RN  Scrub Person: Coretta Kuone      Anesthesia Type:  General    Past Medical History:   Diagnosis Date    Diabetes (H)     Disorder of thyroid     Gastroesophageal reflux disease     History of meningioma     causes seizures    Hyperlipidemia     Hypertension     Seizures (H)     Sleep apnea        Patient Active Problem List    Diagnosis Date Noted    Symptomatic varicose veins of both lower extremities 02/21/2024     Priority: Medium    Diabetes mellitus, type 2 (H) 10/12/2023     Priority: Medium    Benign neoplasm of cerebral meninges (H) 09/16/2022     Priority: Medium     Formatting of this note might be different from the original.  Created by [employer] [facility] Annotation: Oct 29 2007  4:02PM - Lamont Kapadia: s/p craniotomy   and removal of multiple meningioma regrowths, 11/12/12      Carpal tunnel syndrome 09/16/2022     Priority: Medium     Formatting of this note might be different from the original.  Created by Conversion      Disorder of bone and cartilage 09/16/2022     Priority: Medium     Formatting of this note might be different from the original.  Created by Conversion    Replacement Utility updated for latest IMO load      Hereditary and idiopathic peripheral neuropathy 09/16/2022     Priority: Medium     Formatting of this note might be different from the original.  Created by Conversion    Replacement Utility updated for latest IMO load      Acute pain of left knee 07/28/2021     Priority: Medium    Mixed hyperlipidemia 07/28/2021     Priority: Medium    Essential hypertension 07/28/2021     Priority: Medium     Acquired hypothyroidism 07/28/2021     Priority: Medium    Meningioma (H) 07/28/2021     Priority: Medium    Cooper's neuroma 06/04/2018     Priority: Medium    Allergic rhinitis 10/01/2015     Priority: Medium    GERD (gastroesophageal reflux disease) 10/01/2015     Priority: Medium    Seizure disorder (H) 06/11/2015     Priority: Medium    Osteoarthritis of right knee 06/11/2015     Priority: Medium    Partial epilepsy with impairment of consciousness, intractable (H) 09/09/2014     Priority: Medium     Seizures related to multiple meningiomas, last presenting Nov 2012 with 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 Also possible right arm sensory seizures. Seizures broke thru LEV 1500/d Nov 2012 in context of meningioma recurrence. Meningiomas resected and /d added which patient did not tolerate so changed to LEV 2000/d which is max she can tolerate. Seizures broke through maximally tolerated levetiracetam so transitioned to lamotrigine and increased to 300 mg/d with initial control of seizures.           Findings:  12 stabs preformed    Operative Report:    Consent was obtained.  The patient was taken to the operating room and placed in a supine position.  General anesthesia was administered by the anesthesia staff.  The briefing and timeout were performed in the usual fashion.  The patient was prepped and draped in a sterile manner.  Of note all day of symptomatic veins were marked preoperatively with the patient.  Began the procedure by making stabs along the tract of the veins but every 3 to 4 cm pulling veins both proximal distal to the through the small stabs.  Upon completion area hemostasis attained with pressure.  We then cleaned up the area Steri-Strips were applied.  This was continued on all involved segments and areas.  Upon completion we injected 20 Marcaine in all this all the areas.  ABD pads were placed over the area of involvement and held in place with Kerlix rolls  and an Ace wrap was applied from her toes to her thigh.  She tolerated procedure well all sponge needle counts are correct.  She was extubated transferred to PACU in stable condition.    Estimated Blood Loss: 15 ml    Specimens:    none       Drains:   none    Complications:    None    Thierry Lobo MD     Date: 6/13/2024  Time: 8:12 AM

## 2024-06-13 NOTE — INTERVAL H&P NOTE
"I have reviewed the surgical (or preoperative) H&P that is linked to this encounter, and examined the patient. There are no significant changes.        Thierry Lobo MD  General Surgery [phone]  Vascular Surgery [phone]          Clinical Conditions Present on Arrival:  Clinically Significant Risk Factors Present on Admission                 # Drug Induced Platelet Defect: home medication list includes an antiplatelet medication      # Overweight: Estimated body mass index is 26.84 kg/m  as calculated from the following:    Height as of this encounter: 1.494 m (4' 10.8\").    Weight as of this encounter: 59.9 kg (132 lb).       "

## 2024-06-13 NOTE — ANESTHESIA POSTPROCEDURE EVALUATION
Patient: Suzan Ballard    Procedure: Procedure(s):  STAB PHLEBECTOMY, VARICOSE VEIN       Anesthesia Type:  General    Note:  Disposition: Outpatient   Postop Pain Control: Uneventful            Sign Out: Well controlled pain   PONV: No   Neuro/Psych: Uneventful            Sign Out: Acceptable/Baseline neuro status   Airway/Respiratory: Uneventful            Sign Out: Acceptable/Baseline resp. status   CV/Hemodynamics:             Sign Out: Acceptable CV status; No obvious fluid overload (Hypotension upon arriving to PACU. Treated with albumin and phenylephrine.)   Other NRE: NONE   DID A NON-ROUTINE EVENT OCCUR? No           Last vitals:  Vitals Value Taken Time   /57 06/13/24 0920   Temp 97  F (36.1  C) 06/13/24 0910   Pulse 65 06/13/24 0920   Resp 16 06/13/24 0915   SpO2 96 % 06/13/24 0920       Electronically Signed By: Alissa Howard MD  June 13, 2024  11:36 AM

## 2024-06-13 NOTE — DISCHARGE INSTRUCTIONS
You have received 975 mg of Acetaminophen (Tylenol) at 6:30AM. Please do not take an additional dose of Tylenol until after 12:30PM.     Do not exceed 4,000 mg of acetaminophen during a 24 hour period and keep in mind that acetaminophen can also be found in many over-the-counter cold medications as well as narcotics that may be given for pain.      You received a dose of IV Toradol at 7:45 AM. Please do not take any additional Ibuprofen/NSAID products (Aleve/Advil/Motrin/Naproxen/Celebrex) until after 1:45PM.    Please contact Dr. Lobo's office at [phone] with any questions or concerns.

## 2024-06-13 NOTE — ANESTHESIA PROCEDURE NOTES
Airway       Patient location during procedure: OR       Procedure Start/Stop Times: 6/13/2024 7:20 AM  Staff -        Anesthesiologist:  Alissa Howard MD       CRNA: Jacinto Oates APRN CRNA       Performed By: CRNA  Consent for Airway        Urgency: elective  Indications and Patient Condition       Indications for airway management: karen-procedural       Induction type:intravenous       Mask difficulty assessment: 0 - not attempted    Final Airway Details       Final airway type: supraglottic airway    Supraglottic Airway Details        Type: LMA       Brand: Ambu AuraGain       LMA size: 4    Post intubation assessment        Placement verified by: capnometry, equal breath sounds and chest rise        Number of attempts at approach: 1       Number of other approaches attempted: 0       Secured with: tape       Ease of procedure: easy       Dentition: Intact and Unchanged    Medication(s) Administered   Medication Administration Time: 6/13/2024 7:20 AM

## 2024-06-13 NOTE — ANESTHESIA PREPROCEDURE EVALUATION
Anesthesia Pre-Procedure Evaluation    Patient: Suzan Ballard   MRN: [id number] : 1955        Procedure : Procedure(s):  STAB PHLEBECTOMY, VARICOSE VEIN          Past Medical History:   Diagnosis Date    Diabetes (H)     Disorder of thyroid     Gastroesophageal reflux disease     History of meningioma     causes seizures    Hyperlipidemia     Hypertension     Seizures (H)     Sleep apnea       Past Surgical History:   Procedure Laterality Date    CARPAL TUNNEL RELEASE RT/LT Bilateral     CHOLECYSTECTOMY, LAPOROSCOPIC      CRANIOTOMY      CRANIOTOMY        Allergies   Allergen Reactions    Iodinated Contrast Media Rash    Codeine Nausea and Vomiting    Tylenol With Codeine [Acetaminophen-Codeine] Nausea     Dizziness      Social History     Tobacco Use    Smoking status: Never     Passive exposure: Never    Smokeless tobacco: Never   Substance Use Topics    Alcohol use: Yes     Comment: rarely      Wt Readings from Last 1 Encounters:   24 60.2 kg (132 lb 12.8 oz)        Anesthesia Evaluation   Pt has had prior anesthetic.     No history of anesthetic complications       ROS/MED HX  ENT/Pulmonary:     (+)     ANSHU risk factors,                                   Neurologic:     (+)       seizures (no seizures in years. No meds),                         Cardiovascular:     (+)  hypertension- -   -  - -                                      METS/Exercise Tolerance: >4 METS    Hematologic:  - neg hematologic  ROS     Musculoskeletal:  - neg musculoskeletal ROS     GI/Hepatic:     (+) GERD,                   Renal/Genitourinary:  - neg Renal ROS     Endo:     (+) type I DM,         thyroid problem,            Psychiatric/Substance Use:  - neg psychiatric ROS     Infectious Disease:       Malignancy:       Other:            Physical Exam    Airway        Mallampati: III   TM distance: > 3 FB   Neck ROM: full   Mouth opening: > 3 cm    Respiratory Devices and Support         Dental       (+) Removable  "bridges or other hardware      Cardiovascular          Rhythm and rate: regular     Pulmonary           breath sounds clear to auscultation           OUTSIDE LABS:  CBC:   Lab Results   Component Value Date    WBC 6.0 05/31/2024    WBC 5.7 03/29/2024    HGB 13.4 05/31/2024    HGB 14.7 03/29/2024    HCT 41.9 05/31/2024    HCT 45.3 03/29/2024     05/31/2024     03/29/2024     BMP:   Lab Results   Component Value Date     05/31/2024     03/29/2024    POTASSIUM 3.7 05/31/2024    POTASSIUM 3.2 (L) 03/29/2024    CHLORIDE 106 05/31/2024    CHLORIDE 103 03/29/2024    CO2 25 05/31/2024    CO2 26 03/29/2024    BUN 19.4 05/31/2024    BUN 13.3 03/29/2024    CR 0.50 (L) 05/31/2024    CR 0.49 (L) 03/29/2024    GLC 96 05/31/2024    GLC 97 03/29/2024     COAGS: No results found for: \"PTT\", \"INR\", \"FIBR\"  POC: No results found for: \"BGM\", \"HCG\", \"HCGS\"  HEPATIC:   Lab Results   Component Value Date    ALBUMIN 4.4 10/11/2023    PROTTOTAL 7.7 10/11/2023    ALT 15 10/11/2023    AST 21 10/11/2023    GGT 29 11/08/2023    ALKPHOS 125 (H) 10/11/2023    BILITOTAL 1.7 (H) 10/11/2023     OTHER:   Lab Results   Component Value Date    A1C 5.9 (H) 05/31/2024    SANDHYA 9.7 05/31/2024    TSH 0.66 03/29/2024    T4 2.19 (H) 10/11/2023    CRP 0.1 06/23/2021    SED 7 06/23/2021       Anesthesia Plan    ASA Status:  2    NPO Status:  NPO Appropriate    Anesthesia Type: General.     - Airway: LMA   Induction: Intravenous, Propofol.   Maintenance: TIVA.        Consents    Anesthesia Plan(s) and associated risks, benefits, and realistic alternatives discussed. Questions answered and patient/representative(s) expressed understanding.     - Discussed:     - Discussed with:  Patient            Postoperative Care    Pain management: Multi-modal analgesia.   PONV prophylaxis: Ondansetron (or other 5HT-3), Dexamethasone or Solumedrol     Comments:               Alissa Howard MD               # Drug Induced Platelet Defect: " "home medication list includes an antiplatelet medication  # Overweight: Estimated body mass index is 26.99 kg/m  as calculated from the following:    Height as of 5/31/24: 1.494 m (4' 10.82\").    Weight as of 5/31/24: 60.2 kg (132 lb 12.8 oz).      "

## 2024-06-24 NOTE — PATIENT INSTRUCTIONS
"It is important to remember that venous reflux disease is a life-long disease, and you should wear compression stockings as much as you can. They do not need to be worn at night while in bed. It is especially important to wear compression with long periods of sitting, standing, long car rides or if you will be flying. Compression socks should get refilled every 4-6 months. If you do a lot of standing it is good to do calf raises to help keep the blood pumping. If you sit a lot at work, it is good to get up periodically to walk around. Elevation of the foot of your bed 4-6\" helps the blood return back to where it is needed. We can refill your compression prescription for 1 year otherwise your primary care provider is able to refill them for you. Below is a list of places you may go to get your compression stockings.    Please call us with any issues or questions [phone].    [facility]  [address] [place] [address]  Phone: [phone]  Fax: [phone] [facility]  [address] [place] [address]  Phone: [phone]  Fax: [phone]   [facility]  [address] [place] [address]  Phone: [phone]  Fax: [phone] [facility]  [address]  [address] [place] [address]  Phone: [phone]  Fax: [phone]   [facility]  [address] [place] [address]  Phone: [phone] [facility]   [address] ([facility] [place] [address]  Phone: [phone]   [facility]  [address]  [address] [place] [address]      Phone: [phone]  Fax: [phone] [address] [place] [address]      Phone: [phone]  Fax: [phone]     [facility]" [facility] [email or web address]  [address], [place] [address]  [phone]    [facility]  [email or web address]  [address] [place] [address]  Phone: [phone]     [address] [place] [address]  Phone: [phone] [address], [place] [address]  Phone: [phone] [address], [place] [address]  Phone: [phone]    [facility] [email or web address]  [address], [place] [address]  Phone: [phone]    [address] [place] [address]  Phone: [phone] [address], [place] [address]  Phone: [phone]    [facility]  [email or web address]  [phone]

## 2024-07-04 DIAGNOSIS — E11.9 TYPE 2 DIABETES MELLITUS WITHOUT COMPLICATION, WITHOUT LONG-TERM CURRENT USE OF INSULIN (H): ICD-10-CM

## 2024-07-05 RX ORDER — METFORMIN HCL 500 MG
500 TABLET, EXTENDED RELEASE 24 HR ORAL 2 TIMES DAILY WITH MEALS
Qty: 180 TABLET | Refills: 0 | Status: SHIPPED | OUTPATIENT
Start: 2024-07-05 | End: 2024-07-12

## 2024-07-10 ENCOUNTER — OFFICE VISIT (OUTPATIENT)
Dept: VASCULAR SURGERY | Facility: CLINIC | Age: 69
End: 2024-07-10
Attending: SPECIALIST
Payer: MEDICARE

## 2024-07-10 VITALS — SYSTOLIC BLOOD PRESSURE: 127 MMHG | DIASTOLIC BLOOD PRESSURE: 79 MMHG | OXYGEN SATURATION: 98 % | HEART RATE: 66 BPM

## 2024-07-10 DIAGNOSIS — I83.893 SYMPTOMATIC VARICOSE VEINS OF BOTH LOWER EXTREMITIES: Primary | ICD-10-CM

## 2024-07-10 PROCEDURE — 99213 OFFICE O/P EST LOW 20 MIN: CPT | Performed by: SPECIALIST

## 2024-07-10 PROCEDURE — G0463 HOSPITAL OUTPT CLINIC VISIT: HCPCS | Performed by: SPECIALIST

## 2024-07-10 NOTE — PROGRESS NOTES
[facility]        Patient is here for a post-op to discuss post stab phlebectomy left leg 6/13/24. Wearing compression.  when laying down    Pt is currently taking Aspirin and Statin.    LMP  (LMP Unknown)     The provider has been notified that the patient has no concerns.     Questions patient would like addressed today are: N/A.    Refills are needed: N/A    Has homecare services and agency name:  No

## 2024-07-12 ENCOUNTER — OFFICE VISIT (OUTPATIENT)
Dept: FAMILY MEDICINE | Facility: CLINIC | Age: 69
End: 2024-07-12
Payer: MEDICARE

## 2024-07-12 VITALS
WEIGHT: 132 LBS | OXYGEN SATURATION: 98 % | RESPIRATION RATE: 14 BRPM | HEIGHT: 59 IN | HEART RATE: 72 BPM | TEMPERATURE: 98.6 F | DIASTOLIC BLOOD PRESSURE: 62 MMHG | SYSTOLIC BLOOD PRESSURE: 110 MMHG | BODY MASS INDEX: 26.61 KG/M2

## 2024-07-12 DIAGNOSIS — G47.33 OSA (OBSTRUCTIVE SLEEP APNEA): ICD-10-CM

## 2024-07-12 DIAGNOSIS — E11.9 TYPE 2 DIABETES MELLITUS WITHOUT COMPLICATION, WITHOUT LONG-TERM CURRENT USE OF INSULIN (H): Primary | ICD-10-CM

## 2024-07-12 DIAGNOSIS — Z71.84 TRAVEL ADVICE ENCOUNTER: ICD-10-CM

## 2024-07-12 DIAGNOSIS — I10 ESSENTIAL HYPERTENSION: ICD-10-CM

## 2024-07-12 DIAGNOSIS — G40.219 PARTIAL EPILEPSY WITH IMPAIRMENT OF CONSCIOUSNESS, INTRACTABLE (H): ICD-10-CM

## 2024-07-12 DIAGNOSIS — D32.0 BENIGN NEOPLASM OF CEREBRAL MENINGES (H): ICD-10-CM

## 2024-07-12 DIAGNOSIS — E78.2 MIXED HYPERLIPIDEMIA: ICD-10-CM

## 2024-07-12 DIAGNOSIS — E03.9 ACQUIRED HYPOTHYROIDISM: ICD-10-CM

## 2024-07-12 DIAGNOSIS — G89.29 CHRONIC PAIN OF LEFT KNEE: ICD-10-CM

## 2024-07-12 DIAGNOSIS — M25.562 CHRONIC PAIN OF LEFT KNEE: ICD-10-CM

## 2024-07-12 LAB
ALBUMIN SERPL BCG-MCNC: 4.4 G/DL (ref 3.5–5.2)
ALP SERPL-CCNC: 104 U/L (ref 40–150)
ALT SERPL W P-5'-P-CCNC: 20 U/L (ref 0–50)
AST SERPL W P-5'-P-CCNC: 15 U/L (ref 0–45)
BILIRUB DIRECT SERPL-MCNC: 0.29 MG/DL (ref 0–0.3)
BILIRUB SERPL-MCNC: 1.2 MG/DL
PROT SERPL-MCNC: 7.5 G/DL (ref 6.4–8.3)

## 2024-07-12 PROCEDURE — 36415 COLL VENOUS BLD VENIPUNCTURE: CPT | Performed by: STUDENT IN AN ORGANIZED HEALTH CARE EDUCATION/TRAINING PROGRAM

## 2024-07-12 PROCEDURE — 91320 SARSCV2 VAC 30MCG TRS-SUC IM: CPT | Performed by: STUDENT IN AN ORGANIZED HEALTH CARE EDUCATION/TRAINING PROGRAM

## 2024-07-12 PROCEDURE — 90471 IMMUNIZATION ADMIN: CPT | Mod: GZ | Performed by: STUDENT IN AN ORGANIZED HEALTH CARE EDUCATION/TRAINING PROGRAM

## 2024-07-12 PROCEDURE — 80076 HEPATIC FUNCTION PANEL: CPT | Performed by: STUDENT IN AN ORGANIZED HEALTH CARE EDUCATION/TRAINING PROGRAM

## 2024-07-12 PROCEDURE — 99214 OFFICE O/P EST MOD 30 MIN: CPT | Mod: 25 | Performed by: STUDENT IN AN ORGANIZED HEALTH CARE EDUCATION/TRAINING PROGRAM

## 2024-07-12 PROCEDURE — 90480 ADMN SARSCOV2 VAC 1/ONLY CMP: CPT | Performed by: STUDENT IN AN ORGANIZED HEALTH CARE EDUCATION/TRAINING PROGRAM

## 2024-07-12 PROCEDURE — 90691 TYPHOID VACCINE IM: CPT | Performed by: STUDENT IN AN ORGANIZED HEALTH CARE EDUCATION/TRAINING PROGRAM

## 2024-07-12 RX ORDER — SIMVASTATIN 20 MG
20 TABLET ORAL EVERY EVENING
Qty: 90 TABLET | Refills: 1 | Status: SHIPPED | OUTPATIENT
Start: 2024-07-12

## 2024-07-12 RX ORDER — LAMOTRIGINE 100 MG/1
300 TABLET ORAL DAILY
Qty: 270 TABLET | Refills: 1 | Status: SHIPPED
Start: 2024-07-12

## 2024-07-12 RX ORDER — METFORMIN HCL 500 MG
500 TABLET, EXTENDED RELEASE 24 HR ORAL
Qty: 90 TABLET | Refills: 2 | Status: SHIPPED | OUTPATIENT
Start: 2024-07-12

## 2024-07-12 RX ORDER — ATOVAQUONE AND PROGUANIL HYDROCHLORIDE 250; 100 MG/1; MG/1
1 TABLET, FILM COATED ORAL DAILY
Qty: 16 TABLET | Refills: 0 | Status: SHIPPED | OUTPATIENT
Start: 2024-07-12

## 2024-07-12 RX ORDER — LEVOTHYROXINE SODIUM 88 UG/1
88 TABLET ORAL DAILY
Qty: 90 TABLET | Refills: 3 | Status: SHIPPED | OUTPATIENT
Start: 2024-07-12

## 2024-07-12 RX ORDER — CELECOXIB 100 MG/1
100 CAPSULE ORAL DAILY
Qty: 90 CAPSULE | Refills: 0 | Status: SHIPPED | OUTPATIENT
Start: 2024-07-12

## 2024-07-12 RX ORDER — AMLODIPINE BESYLATE 10 MG/1
TABLET ORAL
Qty: 90 TABLET | Refills: 3 | Status: SHIPPED | OUTPATIENT
Start: 2024-07-12

## 2024-07-12 RX ORDER — MECLIZINE HCL 25MG 25 MG/1
25 TABLET, CHEWABLE ORAL EVERY 6 HOURS PRN
Qty: 90 TABLET | Refills: 0 | Status: SHIPPED | OUTPATIENT
Start: 2024-07-12

## 2024-07-12 ASSESSMENT — PAIN SCALES - GENERAL: PAINLEVEL: NO PAIN (0)

## 2024-07-12 NOTE — PROGRESS NOTES
"  Assessment & Plan       ICD-10-CM    1. Type 2 diabetes mellitus without complication, without long-term current use of insulin (H)  E11.9 metFORMIN (GLUCOPHAGE XR) 500 MG 24 hr tablet      2. Benign neoplasm of cerebral meninges (H)  D32.0 meclizine 25 MG CHEW      3. Partial epilepsy with impairment of consciousness, intractable (H)  G40.219 meclizine 25 MG CHEW     lamoTRIgine (LAMICTAL) 100 MG tablet      4. Essential hypertension  I10 amLODIPine (NORVASC) 10 MG tablet      5. Acquired hypothyroidism  E03.9 levothyroxine (SYNTHROID/LEVOTHROID) 88 MCG tablet      6. Mixed hyperlipidemia  E78.2 simvastatin (ZOCOR) 20 MG tablet      7. Chronic pain of left knee  M25.562 celecoxib (CELEBREX) 100 MG capsule    G89.29       8. Travel advice encounter  Z71.84 COVID-19 12+ (2023-24) (PFIZER)     atovaquone-proguanil (MALARONE) 250-100 MG tablet     TYPHOID VACCINE, IM     Hepatic panel (Albumin, ALT, AST, Bili, Alk Phos, TP)     Hepatic panel (Albumin, ALT, AST, Bili, Alk Phos, TP)      9. ANSHU (obstructive sleep apnea)  G47.33             Type 2 diabetes:  Last A1c 6.7 3/29/2024  Was placed on metformin.  Repeat A1c 5/31/2024 much improved at 5.9  Patient today has not been checking her sugars but reports that has changed her diet and has been more mobile after her left knee arthroplasty  She has been tolerating the metformin but \"does not like it\" due to ongoing diarrhea.  Is having about 6-8 episodes of diarrhea daily  Plan:  - Given improved A1c, will decrease metformin to daily  - I told her that it is too early to recheck A1c today.  Unfortunately, she is going to [facility] for the next 6 months.  Will recheck A1c on her return  - Continue to work on lifestyle changes    Travel consult:  Patient is going to [facility] x 6 months  [place] areas [place]   [place]  There is no malaria transmission and the urban areas but there is some on the " islands.  Plan:  - Will give updated typhoid and COVID-vaccine today  - Will give her atovaquone-proguanil for when [place]  - She has meclizine already  - Counseling regarding traveler's diarrhea provided    ANSHU not on CPAP:  She does have an appointment coming up with sleep medicine next Wednesday to get a CPAP    Left knee arthroplasty  Phlebectomy of varicose veins, left leg  Patient doing well from both procedures  Pain in the left knee has almost completely resolved  She still has some aching from the phlebectomy but that is improving  She is taking Celebrex sparingly  Is requesting refill on Celebrex because she is planning to go to the [facility] for 6 months  Refill provided but advised patient against chronic use of the Celebrex    Epilepsy 2/2 meningioma:  Was placed on Lamictal 300 mg in the past  Last time she was here, she had discontinued Lamictal for unclear reasons  Had advised her to resume ASAP and go to see general neurology  She has not set up with GENERAL surgery and has not resumed her Lamictal  She is still undergoing management of her meningioma  Plan:  - Advised patient to resume the Lamictal ASAP  - Will get updated hepatic panel  - Number to set up with neurology given to the patient again today    Meningioma:  Is still undergoing gamma knife treatment for her meningioma 2/2 progression  Currently stable without any new neurologic deficits    32 minutes spent by me on the date of the encounter doing chart review, history and exam, documentation and further activities per the note    The longitudinal plan of care for the diagnosis(es)/condition(s) as documented were addressed during this visit. Due to the added complexity in care, I will continue to support Suzan in the subsequent management and with ongoing continuity of care.    Jenny Mares is a 68 year old, presenting for the following health issues:  Diabetes      7/12/2024    10:25 AM   Additional Questions  "  Roomed by polly         Review of Systems  As per HPI       Objective    /62 (BP Location: Right arm, Patient Position: Sitting)   Pulse 72   Temp 98.6  F (37  C) (Oral)   Resp 14   Ht 1.51 m (4' 11.45\")   Wt 59.9 kg (132 lb)   LMP  (LMP Unknown)   SpO2 98%   BMI 26.26 kg/m    Body mass index is 26.26 kg/m .  Physical Exam   GENERAL: alert and no distress  NECK: no adenopathy, no asymmetry, masses, or scars  RESP: lungs clear to auscultation - no rales, rhonchi or wheezes  CV: regular rate and rhythm,  no murmur, click or rub, no peripheral edema  MS: no gross musculoskeletal defects noted, no edema  PSYCH: mentation appears normal, affect normal/bright        Signed Electronically by: Angela Lacey DO    "

## 2024-07-12 NOTE — LETTER
July 19, 2024      Suzan Ballard  [address] [address]        Dear MsCorina,    We are writing to inform you of your test results.      Liver enzyme is stable     Resulted Orders   Hepatic panel (Albumin, ALT, AST, Bili, Alk Phos, TP)   Result Value Ref Range    Protein Total 7.5 6.4 - 8.3 g/dL    Albumin 4.4 3.5 - 5.2 g/dL    Bilirubin Total 1.2 <=1.2 mg/dL    Alkaline Phosphatase 104 40 - 150 U/L    AST 15 0 - 45 U/L    ALT 20 0 - 50 U/L    Bilirubin Direct 0.29 0.00 - 0.30 mg/dL       If you have any questions or concerns, please call the clinic at the number listed above.       Sincerely,      Angela Lacey, DO

## 2024-07-17 ENCOUNTER — OFFICE VISIT (OUTPATIENT)
Dept: SLEEP MEDICINE | Facility: CLINIC | Age: 69
End: 2024-07-17
Attending: STUDENT IN AN ORGANIZED HEALTH CARE EDUCATION/TRAINING PROGRAM
Payer: MEDICARE

## 2024-07-17 VITALS
DIASTOLIC BLOOD PRESSURE: 77 MMHG | WEIGHT: 133 LBS | HEIGHT: 60 IN | HEART RATE: 63 BPM | SYSTOLIC BLOOD PRESSURE: 133 MMHG | OXYGEN SATURATION: 99 % | BODY MASS INDEX: 26.11 KG/M2

## 2024-07-17 DIAGNOSIS — G47.33 OSA (OBSTRUCTIVE SLEEP APNEA): Primary | ICD-10-CM

## 2024-07-17 PROCEDURE — 99204 OFFICE O/P NEW MOD 45 MIN: CPT | Performed by: INTERNAL MEDICINE

## 2024-07-17 ASSESSMENT — SLEEP AND FATIGUE QUESTIONNAIRES
HOW LIKELY ARE YOU TO NOD OFF OR FALL ASLEEP WHILE SITTING AND TALKING TO SOMEONE: WOULD NEVER DOZE
HOW LIKELY ARE YOU TO NOD OFF OR FALL ASLEEP WHEN YOU ARE A PASSENGER IN A CAR FOR AN HOUR WITHOUT A BREAK: SLIGHT CHANCE OF DOZING
HOW LIKELY ARE YOU TO NOD OFF OR FALL ASLEEP WHILE SITTING QUIETLY AFTER LUNCH WITHOUT ALCOHOL: WOULD NEVER DOZE
HOW LIKELY ARE YOU TO NOD OFF OR FALL ASLEEP WHILE SITTING AND READING: SLIGHT CHANCE OF DOZING
HOW LIKELY ARE YOU TO NOD OFF OR FALL ASLEEP WHILE WATCHING TV: SLIGHT CHANCE OF DOZING
HOW LIKELY ARE YOU TO NOD OFF OR FALL ASLEEP WHILE LYING DOWN TO REST IN THE AFTERNOON WHEN CIRCUMSTANCES PERMIT: SLIGHT CHANCE OF DOZING
HOW LIKELY ARE YOU TO NOD OFF OR FALL ASLEEP IN A CAR, WHILE STOPPED FOR A FEW MINUTES IN TRAFFIC: WOULD NEVER DOZE
HOW LIKELY ARE YOU TO NOD OFF OR FALL ASLEEP WHILE SITTING INACTIVE IN A PUBLIC PLACE: WOULD NEVER DOZE

## 2024-07-17 NOTE — PROGRESS NOTES
Sleep Consultation:    Date on this visit: 7/17/2024    Suzan Ballard  is referred by Angela Lacey for a sleep consultation.     Primary Physician: Angela Lacey     Suzan Ballard is a 68 years old female, with  medical history significant for meningioma, seizure disorder, hypertension, DM-2, who presents to clinic for management of obstructive sleep apnea.    Patient was diagnosed with severe obstructive sleep apnea in 2013.  Split-night PSG on 10/29/2013 at a weight of 164 pounds showed an apnea-hypopnea index of 55/h and lowest oxygen saturation of 85%.  CPAP at a pressure of 9 cm H2O was effective.    Patient was subsequently started on CPAP therapy.  From her last clinic visit with Dr. Morris in 2019, she was set to a pressure of 6 cm H2O.     Patient reports that she did well with CPAP.  However when her device malfunctioned, she was unable to get a new device and did not continue therapy.    Suzan does snore every night. Patient's  and family  does complain of snoring and poor quality of sleep. She does have witnessed apneas.They frequently sleep separately.  Patient sleeps on her back and side. She has occasional morning headaches and frequent morning dry mouth, denies no restless legs.     Suzan goes to sleep at 11:00 PM - 12 AM. She wakes up at 6:00 AM. She falls asleep in 60 minutes.  Suzan has difficulty falling asleep.  She wakes up 1-2 times a night for 30 minutes before falling back to sleep.  Suzan wakes up to go to the bathroom.  Patient gets an average of 6 hours of sleep per night.     Suzan denies any sleep walking, sleep talking, dream enactment, sleep paralysis, cataplexy, and hypnogogic/hypnopompic hallucinations.    Suzan denies difficulty breathing through her nose.      Patient's [place] Sleepiness score 4/24 consistent with no daytime sleepiness.      Suzan naps 1-2 times per week for 20-30 minutes, feels refreshed after naps. She takes some inadvertant naps.  She no longer drives.      She uses 1 cups/day of coffee. Last caffeine intake is usually before noon.    Problem List:  Patient Active Problem List    Diagnosis Date Noted    Symptomatic varicose veins of both lower extremities 02/21/2024     Priority: Medium    Diabetes mellitus, type 2 (H) 10/12/2023     Priority: Medium    Benign neoplasm of cerebral meninges (H) 09/16/2022     Priority: Medium     Formatting of this note might be different from the original.  Created by Conversion  [facility] Annotation: Oct 29 2007  4:02PM - Lamont Kapadia: s/p craniotomy   and removal of multiple meningioma regrowths, 11/12/12      Carpal tunnel syndrome 09/16/2022     Priority: Medium     Formatting of this note might be different from the original.  Created by Conversion      Disorder of bone and cartilage 09/16/2022     Priority: Medium     Formatting of this note might be different from the original.  Created by Conversion    Replacement Utility updated for latest IMO load      Hereditary and idiopathic peripheral neuropathy 09/16/2022     Priority: Medium     Formatting of this note might be different from the original.  Created by Conversion    Replacement Utility updated for latest IMO load      Acute pain of left knee 07/28/2021     Priority: Medium    Mixed hyperlipidemia 07/28/2021     Priority: Medium    Essential hypertension 07/28/2021     Priority: Medium    Acquired hypothyroidism 07/28/2021     Priority: Medium    Meningioma (H) 07/28/2021     Priority: Medium    Cooper's neuroma 06/04/2018     Priority: Medium    Allergic rhinitis 10/01/2015     Priority: Medium    GERD (gastroesophageal reflux disease) 10/01/2015     Priority: Medium    Seizure disorder (H) 06/11/2015     Priority: Medium    Osteoarthritis of right knee 06/11/2015     Priority: Medium    Partial epilepsy with impairment of consciousness, intractable (H) 09/09/2014     Priority: Medium     Seizures related to multiple meningiomas, last presenting Nov 2012 with  "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 Also possible right arm sensory seizures. Seizures broke thru LEV 1500/d Nov 2012 in context of meningioma recurrence. Meningiomas resected and /d added which patient did not tolerate so changed to LEV 2000/d which is max she can tolerate. Seizures broke through maximally tolerated levetiracetam so transitioned to lamotrigine and increased to 300 mg/d with initial control of seizures.            Past Medical/Surgical History:  Past Medical History:   Diagnosis Date    Diabetes (H)     Disorder of thyroid     Gastroesophageal reflux disease     History of meningioma     causes seizures    Hyperlipidemia     Hypertension     Seizures (H)     Sleep apnea      Physical Examination:  Vitals: /77   Pulse 63   Ht 1.511 m (4' 11.5\")   Wt 60.3 kg (133 lb)   LMP  (LMP Unknown)   SpO2 99%   BMI 26.41 kg/m    BMI= Body mass index is 26.41 kg/m .  GENERAL APPEARANCE: healthy, alert, and no distress  HENT: oropharynx crowded  NEURO: mentation intact and speech normal  PSYCH: mentation appears normal and affect normal/bright  Mallampati Class: IV.  Tonsillar Stage: 1  hidden by pillars.    Impression/Plan:    Obstructive sleep apnea  Hypertension    Patient was previously diagnosed with severe obstructive sleep apnea in 2013 at a weight of 164 pounds, with a baseline apnea-hypopnea index of 55/h.  He was previously successfully treated with CPAP, but has not been on treatment for many years since her device malfunctioned, and she was unable to get a replacement.  Her weight is currently 133 pounds.  She continues to have symptoms of sleep apnea.  Considering weight loss and change in her clinical situation, I recommend performing a split-night PSG for reassessment of sleep apnea and titration.    Plan:     Split night PSG for assessment of sleep apnea    She will follow up with me in approximately two weeks after her sleep study has been competed to " review the results and discuss plan of care.       Polysomnography reviewed.  Obstructive sleep apnea reviewed.  Complications of untreated sleep apnea were reviewed.    Electronically signed by Dr. Clemente Cooper, Diplomate, Sleep Medicine, ABPN       CC: Angela Lacey

## 2024-07-17 NOTE — NURSING NOTE
"Chief Complaint   Patient presents with    Sleep Problem     Lost CPAP, my doctor says I need to get a new one       Initial /77   Pulse 63   Ht 1.511 m (4' 11.5\")   Wt 60.3 kg (133 lb)   LMP  (LMP Unknown)   SpO2 99%   BMI 26.41 kg/m   Estimated body mass index is 26.41 kg/m  as calculated from the following:    Height as of this encounter: 1.511 m (4' 11.5\").    Weight as of this encounter: 60.3 kg (133 lb).    Medication Reconciliation: complete  ESS 4  Neck circumference: 37 centimeters.  Albertina Iverson MA   "

## 2024-07-24 NOTE — PROGRESS NOTES
HPI: Pt is here for follow up of a stab phlebectomy left side. .  She is doing well. Her appetite is good, and bowel function regular.  No fevers or chills. Ambulating without problems.       /79   Pulse 66   LMP  (LMP Unknown)   SpO2 98%       EXAM: This is a  68 year old WOMAN in no distress  GENERAL: Appears well  CHEST clear  CVS S1S2 NSR  ABDOMEN: Soft, non-tender.   EXT: warm, moves without difficulty incisions look good no signs of infection or issue.  Some bruising.        Side:: Left  VCSS  PAIN:: Mild: Occasional, not restricting activity of requiring pain medication  Varicose Veins:: Absent: None  Venous Edema:: Absent: None  Skin Pigmentation:: Mild: Diffuse, but limited in area and old (brown)  Inflamation:: Absent: None  Induration:: Absent: None  Number of active ulcers:: 0  Active ulcer duration:: None  Active ulcer diameter:: None  Compression Therapy:: Full compliance stockings + elevation  VCSS Score:: 5  CEAP:: Simple varicose veins only    Assessment/Plan:   Symptomatic varicose veins left.    Status post phlebectomy left doing well I will change over to knee-high socks exercise as activities as tolerates no restrictions recommendation is to wear socks 60 to 70% of time she can return for questions problems or issues.  Discussed and answered all questions her today.  She is comfortable to plan.      Thierry Lobo MD ,MD  [facility] Department of Surgery

## 2024-09-11 ENCOUNTER — PATIENT OUTREACH (OUTPATIENT)
Dept: CARE COORDINATION | Facility: CLINIC | Age: 69
End: 2024-09-11
Payer: MEDICARE

## 2024-09-14 ENCOUNTER — HEALTH MAINTENANCE LETTER (OUTPATIENT)
Age: 69
End: 2024-09-14

## 2024-09-25 ENCOUNTER — PATIENT OUTREACH (OUTPATIENT)
Dept: CARE COORDINATION | Facility: CLINIC | Age: 69
End: 2024-09-25
Payer: MEDICARE

## 2024-10-10 DIAGNOSIS — M25.562 CHRONIC PAIN OF LEFT KNEE: ICD-10-CM

## 2024-10-10 DIAGNOSIS — G89.29 CHRONIC PAIN OF LEFT KNEE: ICD-10-CM
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
## 2024-10-15 RX ORDER — CELECOXIB 100 MG/1
100 CAPSULE ORAL DAILY
Qty: 90 CAPSULE | Refills: 0 | Status: SHIPPED | OUTPATIENT
Start: 2024-10-15

## 2024-11-23 ENCOUNTER — HEALTH MAINTENANCE LETTER (OUTPATIENT)
Age: 69
End: 2024-11-23

## 2024-12-02 ENCOUNTER — ANCILLARY PROCEDURE (OUTPATIENT)
Dept: MRI IMAGING | Facility: CLINIC | Age: 69
End: 2024-12-02
Attending: NEUROLOGICAL SURGERY
Payer: MEDICARE

## 2024-12-02 DIAGNOSIS — D32.9 MENINGIOMA (H): ICD-10-CM

## 2024-12-02 PROCEDURE — 70553 MRI BRAIN STEM W/O & W/DYE: CPT | Mod: MG

## 2024-12-02 PROCEDURE — A9585 GADOBUTROL INJECTION: HCPCS | Performed by: NEUROLOGICAL SURGERY

## 2024-12-02 PROCEDURE — 255N000002 HC RX 255 OP 636: Performed by: NEUROLOGICAL SURGERY

## 2024-12-02 RX ORDER — GADOBUTROL 604.72 MG/ML
6 INJECTION INTRAVENOUS ONCE
Status: COMPLETED | OUTPATIENT
Start: 2024-12-02 | End: 2024-12-02

## 2024-12-02 RX ADMIN — GADOBUTROL 6 ML: 604.72 INJECTION INTRAVENOUS at 10:32

## 2024-12-06 ENCOUNTER — OFFICE VISIT (OUTPATIENT)
Dept: NEUROSURGERY | Facility: CLINIC | Age: 69
End: 2024-12-06
Payer: COMMERCIAL

## 2024-12-06 VITALS — SYSTOLIC BLOOD PRESSURE: 143 MMHG | OXYGEN SATURATION: 96 % | DIASTOLIC BLOOD PRESSURE: 77 MMHG | HEART RATE: 64 BPM

## 2024-12-06 DIAGNOSIS — G40.909 SEIZURE DISORDER (H): ICD-10-CM

## 2024-12-06 DIAGNOSIS — D32.9 MENINGIOMA (H): Primary | ICD-10-CM

## 2024-12-06 PROCEDURE — 99214 OFFICE O/P EST MOD 30 MIN: CPT | Performed by: NEUROLOGICAL SURGERY

## 2024-12-06 ASSESSMENT — PAIN SCALES - GENERAL: PAINLEVEL_OUTOF10: NO PAIN (0)

## 2024-12-06 NOTE — LETTER
"12/6/2024      Suzan Ballard  [id number]  [facility] [address]      Dear Colleague,    Thank you for referring your patient, Suzan Ballard, to the [facility]. Please see a copy of my visit note below.    It was a pleasure to see Suzan Ballard today in Neurosurgery Clinic. She is a 69 year old female who has a history of previous surgeries for meningioma and underwent gamma knife radiosurgery to an area of recurrence in December of last year.  The treated areas were along the sagittal sinus posteriorly and the anterior falx.      She does not have any significant concerns since last year.  She is interested in stopping her seizure medications.  It sounds as if she has not had a seizure For more than a decade.    Vitals:    12/06/24 1006   BP: (!) 143/77   Pulse: 64   SpO2: 96%     Estimated body mass index is 26.41 kg/m  as calculated from the following:    Height as of 7/17/24: 1.511 m (4' 11.5\").    Weight as of 7/17/24: 60.3 kg (133 lb).  No Pain (0)    Awake alert and oriented neurologically intact.  Review of her MRI from 2018,      Imaging: December 2023, December 2024 shows growth of the meningioma along the posterior sagittal sinus between 2018 and 2023.  To my review it appears that this has shrunk slightly since her treatment last year although the radiologist think it may have grown slightly.  Overall I am not concerned by the changes.  The anterior falx meningioma appears stable in size.  The imaging was reviewed with the patient shown to the patient clinic today.    Assessment: 1.  Meningiomas, status post radiosurgery, stable.  2.  Seizure disorder.    Plan: I would like to see the patient back in 1 year with a repeat MRI of the brain.  We will continue to monitor her meningiomas.  The patient is interesting in stopping her seizure medication.  I have placed a referral to neurology for further evaluation and discussion of the risks and benefits of doing so.   "       Again, thank you for allowing me to participate in the care of your patient.        Sincerely,        Narinder Sauer MD

## 2024-12-06 NOTE — PROGRESS NOTES
"It was a pleasure to see Suzan Ballard today in Neurosurgery Clinic. She is a 69 year old female who has a history of previous surgeries for meningioma and underwent gamma knife radiosurgery to an area of recurrence in December of last year.  The treated areas were along the sagittal sinus posteriorly and the anterior falx.      She does not have any significant concerns since last year.  She is interested in stopping her seizure medications.  It sounds as if she has not had a seizure For more than a decade.    Vitals:    12/06/24 1006   BP: (!) 143/77   Pulse: 64   SpO2: 96%     Estimated body mass index is 26.41 kg/m  as calculated from the following:    Height as of 7/17/24: 1.511 m (4' 11.5\").    Weight as of 7/17/24: 60.3 kg (133 lb).  No Pain (0)    Awake alert and oriented neurologically intact.  Review of her MRI from 2018,      Imaging: December 2023, December 2024 shows growth of the meningioma along the posterior sagittal sinus between 2018 and 2023.  To my review it appears that this has shrunk slightly since her treatment last year although the radiologist think it may have grown slightly.  Overall I am not concerned by the changes.  The anterior falx meningioma appears stable in size.  The imaging was reviewed with the patient shown to the patient clinic today.    Assessment: 1.  Meningiomas, status post radiosurgery, stable.  2.  Seizure disorder.    Plan: I would like to see the patient back in 1 year with a repeat MRI of the brain.  We will continue to monitor her meningiomas.  The patient is interesting in stopping her seizure medication.  I have placed a referral to neurology for further evaluation and discussion of the risks and benefits of doing so.     "

## 2024-12-06 NOTE — NURSING NOTE
"Suzan Ballard is a 69 year old female who presents for:  Chief Complaint   Patient presents with    RECHECK     1 year follow up completing Gamma Knife treatments per staff message  MRI Brain prior (12/02/2024)           Initial Vitals:  BP (!) 143/77   Pulse 64   LMP  (LMP Unknown)   SpO2 96%  Estimated body mass index is 26.41 kg/m  as calculated from the following:    Height as of 7/17/24: 4' 11.5\" (1.511 m).    Weight as of 7/17/24: 133 lb (60.3 kg).. There is no height or weight on file to calculate BSA. BP completed using cuff size: regular  No Pain (0)    Nursing Comments:       Sarah Kenney    "

## 2024-12-09 ENCOUNTER — THERAPY VISIT (OUTPATIENT)
Dept: SLEEP MEDICINE | Facility: CLINIC | Age: 69
End: 2024-12-09
Payer: COMMERCIAL

## 2024-12-09 DIAGNOSIS — G47.33 OSA (OBSTRUCTIVE SLEEP APNEA): Primary | ICD-10-CM

## 2024-12-09 PROCEDURE — 95811 POLYSOM 6/>YRS CPAP 4/> PARM: CPT | Performed by: INTERNAL MEDICINE

## 2024-12-10 NOTE — PATIENT INSTRUCTIONS
[facility]    1. Your sleep study will be reviewed by a sleep physician.     2. Please follow up in the sleep clinic as scheduled, or, make an appointment with your sleep provider to be seen in 3 months.    3. If you have any questions or problems with your treatment plan, please contact your sleep clinic provider at [phone] to further manage your condition.    4. Please review your attached medication list, and, at your follow-up appointment advise your sleep clinic provider about any changes.    5. Go to [email or web address] for more information about your sleep problems.    Anastacio TRAYLOR RRT, RPSGT  December 10, 2024

## 2024-12-10 NOTE — PROGRESS NOTES
Completed a split night PSG per provider order.    Preliminary AHI 15.  A final therapeutic PAP pressure was achieved.    Supine REM was seen on therapeutic pressure.    Patient reports feeling refreshed in AM.

## 2024-12-12 ENCOUNTER — MYC MEDICAL ADVICE (OUTPATIENT)
Dept: SLEEP MEDICINE | Facility: CLINIC | Age: 69
End: 2024-12-12
Payer: MEDICARE

## 2024-12-12 DIAGNOSIS — G47.33 OSA (OBSTRUCTIVE SLEEP APNEA): Primary | ICD-10-CM

## 2024-12-12 LAB — SLPCOMP: NORMAL

## 2024-12-12 NOTE — PROCEDURES
"SLEEP STUDY INTERPRETATION  SPLIT NIGHT STUDY      Patient: FLORESITA ROY  YOB: 1955  Study Date: 12/9/2024  MRN: [id number]  Referring Provider: Angela Lacey DO  Ordering Provider: Clemente Cooper MD    Indications for Polysomnography: The patient is a 69 year old Female who is 4' 11\" and weighs 133.0 lbs. Her BMI is 26.5, [place] sleepiness scale 4/24 and neck circumference is 34 cm. A diagnostic polysomnogram was performed to evaluate for sleep apnea. After 165.5 minutes of sleep time the patient exhibited sufficient respiratory events qualifying her for a CPAP trial which was then initiated.    Polysomnogram Data: A full night polysomnogram recorded the standard physiologic parameters including EEG, EOG, EMG, ECG, nasal and oral airflow. Respiratory parameters of chest and abdominal movements were recorded with respiratory inductance plethysmography. Oxygen saturation was recorded by pulse oximetry.  Hypopnea scoring rule used: 1B 4%    Diagnostic PSG  Sleep Architecture: Fragmented sleep with reduced sleep efficiency.   The total recording time of the polysomnogram was 217.5 minutes. The total sleep time was 165.5 minutes. Sleep latency was normal at 16.5 minutes without the use of a sleep aid. REM latency was 130.5 minutes. Arousal index was increased at 50.4 arousals per hour. Sleep efficiency was decreased at 76.1%. Wake after sleep onset was 35.5 minutes. The patient spent 13.6% of total sleep time in Stage N1, 42.0% in Stage N2, 22.4% in Stage N3, and 22.1% in REM. Time in REM supine was - minutes.    Respiration: Events ? The polysomnogram revealed a presence of 6 obstructive, 2 central, and - mixed apneas resulting in an apnea index of 2.9 events per hour. There were 32 obstructive hypopneas and - central hypopneas resulting in an obstructive hypopnea index of 11.6 and central hypopnea index of - events per hour. The combined apnea/hypopnea index was 14.5 events per hour (central " apnea/hypopnea index was 0.7 events per hour).  The REM AHI was 9.9 events per hour. The supine AHI was 31.4 events per hour. The RERA index was 7.6 events per hour. The RDI was 22.1 events per hour.  Snoring - was reported as loud.  Respiratory rate and pattern - was notable for normal respiratory rate and pattern.  Sustained Sleep Associated Hypoventilation - Transcutaneous carbon dioxide monitoring was not used; however significant hypoventilation was not suggested by oximetry.  Sleep Associated Hypoxemia - (Greater than 5 minutes O2 sat at or below 88%) was not present. Baseline oxygen saturation was 95.2%. Lowest oxygen saturation was 86.0%. Time spent less than or equal to 88% was 0.5 minutes. Time spent less than or equal to 89% was 1.2 minutes.     Treatment PSG  Sleep Architecture: At 02:36:41 AM the patient was placed on PAP treatment and was titrated at pressures ranging from CPAP 5 cmH2O up to CPAP 5 cmH2O. The total recording time of the treatment portion of the study was 228.0 minutes. The total sleep time was 197.5 minutes. During the treatment portion of the study the sleep latency was 7.5 minutes. REM latency was 91.0 minutes. Arousal index was increased at 25.5 arousals per hour. Sleep efficiency was normal at 86.6%. Wake after sleep onset was 23.0 minutes. The patient spent 7.6% of total sleep time in Stage N1, 47.6% in Stage N2, 35.4% in Stage N3, and 9.4% in REM. Time in REM supine was 18.5 minutes.     Respiration: The final pressure was CPAP 5 cmH2O with an AHI of 1.2 events per hour. Time in REM supine on final pressure was 18.5 minutes.   This titration was considered:  Optimal (residual AHI < 5 events per hour and including REM?supine sleep at final pressure).     Movement Activity: Mildly increased periodic limb movements of sleep without significant degree of associated arousals.   Periodic Limb Movements  During the diagnostic portion of the study, there were 76 PLMs recorded. The PLM  index was 27.6 movements per hour. The PLM Arousal Index was 13.8 per hour.  During the treatment portion of the study, there were 60 PLMs recorded. The PLM index was 18.2 movements per hour. The PLM Arousal Index was 8.2 per hour.  REM EMG Activity - Excessive transient/sustained muscle activity was not present.  Nocturnal Behavior - Abnormal sleep related behaviors were not noted during/arising out of NREM / REM sleep.   Bruxism - None apparent.    Cardiac Summary: Sinus rhythm.   During the diagnostic portion of the study, the average pulse rate was 56.0 bpm. The minimum pulse rate was 49.0 bpm while the maximum pulse rate was 68.0 bpm.    During the treatment portion of the study, the average pulse rate was 52.2 bpm. The minimum pulse rate was 47.0 bpm while the maximum pulse rate was 79.0 bpm.     Arrhythmias were not noted.    Assessment:   This sleep study shows mild to moderate degree of obstructive sleep apnea with associated oxygen desaturations and sleep fragmentation.  Sleep apnea events were predominantly supine position related with supine AHI of 31.4 per hour and non-supine AHI of 4.1 per hour. Supine REM was not captured during diagnostic portion, which could underestimate degree of sleep disordered breathing.   CPAP titration was effective in treating sleep apnea, correcting oxygen desaturations and reducing sleep fragmentation. CPAP pressure of 5 cm H2O was optimal in supine REM sleep.      Recommendations:  CPAP therapy is the treatment of choice for obstructive sleep apnea. Recommend treatment of ANSHU with Auto?titrating PAP therapy with a range of 5 cmH2O to 8 cmH2O. Recommend clinical follow up with sleep management team, including review of compliance measures.  If CPAP therapy is not tolerated or accepted, patient can consider oral appliance therapy through referral to dental sleep medicine.   If devices are not accepted, patient can consider evaluation of surgical options through referral to  specialized ENT surgery.  Suggest optimizing sleep schedule and avoiding sleep deprivation.  Clinically assess if patient has subjective symptoms of restless leg syndrome (urge to move legs, unpleasant sensations in legs, partial relief with movement, worsening in the evening etc.) and evaluate symptomatic burden from periodic limb movements of sleep. Pharmacologic therapy is only indicated if there is restless legs syndrome or if patient has clinically significant sleep disturbance/ daytime dysfunction attributable to periodic limb movements of sleep. If pharmacotherapy is clinically indicated, consider Gabapentinoid or dopaminergic agent.    Diagnostic Codes:   Obstructive Sleep Apnea G47.33  Repetitive Intrusions Into Sleep F51.8      12/9/2024 [facility] Sleep Study (133.0 lbs) - AHI 14.5, RDI 22.1, Supine AHI 31.4, REM AHI 9.9, Low O2% 86.0%, Time Spent <=88% 0.5, Time Spent <=89% 1.2. Treatment was titrated to a pressure of CPAP 5 with an AHI 1.2. Time spent in REM supine at this pressure was 18.5 minutes.     _____________________________________   Electronically Signed By: Clemente Cooper MD 12/12/2024

## 2025-01-07 ENCOUNTER — OFFICE VISIT (OUTPATIENT)
Dept: SLEEP MEDICINE | Facility: CLINIC | Age: 70
End: 2025-01-07
Payer: COMMERCIAL

## 2025-01-07 VITALS
HEIGHT: 60 IN | WEIGHT: 128 LBS | DIASTOLIC BLOOD PRESSURE: 77 MMHG | BODY MASS INDEX: 25.13 KG/M2 | OXYGEN SATURATION: 99 % | HEART RATE: 64 BPM | SYSTOLIC BLOOD PRESSURE: 120 MMHG

## 2025-01-07 DIAGNOSIS — G47.33 OSA (OBSTRUCTIVE SLEEP APNEA): Primary | ICD-10-CM

## 2025-01-07 PROCEDURE — 99214 OFFICE O/P EST MOD 30 MIN: CPT | Performed by: INTERNAL MEDICINE

## 2025-01-07 ASSESSMENT — SLEEP AND FATIGUE QUESTIONNAIRES
HOW LIKELY ARE YOU TO NOD OFF OR FALL ASLEEP WHILE SITTING AND TALKING TO SOMEONE: WOULD NEVER DOZE
HOW LIKELY ARE YOU TO NOD OFF OR FALL ASLEEP WHILE SITTING AND READING: SLIGHT CHANCE OF DOZING
HOW LIKELY ARE YOU TO NOD OFF OR FALL ASLEEP WHILE SITTING INACTIVE IN A PUBLIC PLACE: SLIGHT CHANCE OF DOZING
HOW LIKELY ARE YOU TO NOD OFF OR FALL ASLEEP WHILE LYING DOWN TO REST IN THE AFTERNOON WHEN CIRCUMSTANCES PERMIT: MODERATE CHANCE OF DOZING
HOW LIKELY ARE YOU TO NOD OFF OR FALL ASLEEP WHILE SITTING QUIETLY AFTER LUNCH WITHOUT ALCOHOL: WOULD NEVER DOZE
HOW LIKELY ARE YOU TO NOD OFF OR FALL ASLEEP WHEN YOU ARE A PASSENGER IN A CAR FOR AN HOUR WITHOUT A BREAK: SLIGHT CHANCE OF DOZING
HOW LIKELY ARE YOU TO NOD OFF OR FALL ASLEEP WHILE WATCHING TV: SLIGHT CHANCE OF DOZING
HOW LIKELY ARE YOU TO NOD OFF OR FALL ASLEEP IN A CAR, WHILE STOPPED FOR A FEW MINUTES IN TRAFFIC: SLIGHT CHANCE OF DOZING

## 2025-01-07 NOTE — PROGRESS NOTES
"Sleep Study Follow-Up Visit:    Date on this visit: 1/7/2025    Suzan Ballard comes in today for follow-up of her split night sleep study done on 12/9/2024 at the [facility] for possible sleep apnea.    Sleep latency 16.5 minutes.  REM achieved.   REM latency 130.5 minutes.  Sleep efficiency 76%. Total sleep time 165 minutes.    Sleep architecture:  Stage 1, 13.6% (5%), stage 2, 42% (45-55%), stage 3, 22.4% (15-20%), stage REM, 22% (20-25%).  AHI was 14.5, with desaturations. RDI 22.  REM AHII 9.9, consistent with mild REM ANSHU.  Supine AHI 31.4, consistent with severe SUPINE ANSHU.  Periodic Limb Movement Index 27.6/hour. The PLM Arousal Index was 13.8 per hour.       CPAP titration:  Sleep latency 7.5 minutes.  REM latency 91 minutes.  Sleep efficiency 86%. Total sleep time 197.5 minutes. Sleep architecture:  Stage 1, 7.6% (5%), stage 2, 47.6% (45-55%), stage 3, 35.4% (15-20%), stage REM, 9.4% (20-25%).  CPAP was titrated to 5 cm with elimination of apneas, hypopneas and desaturations. Supine REM was noted at this pressure.  Periodic Limb Movement Index 18.2/hour.       These findings were reviewed with patient.     Past medical/surgical history, family history, social history, medications and allergies were reviewed.      /77   Pulse 64   Ht 1.511 m (4' 11.5\")   Wt 58.1 kg (128 lb)   LMP  (LMP Unknown)   SpO2 99%   BMI 25.42 kg/m        Impression/Plan:    Obstructive sleep apnea  Intermittent restless legs    PSG result was discussed in detail with the patient.  In 2013, she was diagnosed with severe obstructive sleep apnea.  Severity of sleep disordered breathing has reduced with weight loss.  We discussed mild to moderate obstructive sleep apnea found on his PSG, and therapy options.  Patient opts to start CPAP therapy and he is scheduled to  her CPAP device tomorrow.  She travels to [facility] for about 3 months during the year.  In the future, she wants to explore if she can " use a dental appliance during this trips.  Referral to dental sleep medicine muscles provided.    Considering mildly increased periodic limb movements of sleep on her sleep study, we discussed whether she has symptoms of restless leg syndrome.  She has intermittent restless legs, usually occurring once or twice in a month without significant symptomatic burden.  We decided not to proceed with any pharmacotherapy for intermittent restless legs.    Plan:     Start auto titrating CPAP therapy with a pressure range of 5 to 15 cm H2O  Follow-up in a few months to review progress      Electronically signed by Dr. Clemente Cooper, Diplomate, Sleep Medicine, ABPN       CC: Angela Lacey

## 2025-01-07 NOTE — NURSING NOTE
"Chief Complaint   Patient presents with    Study Results       Initial /77   Pulse 64   Ht 1.511 m (4' 11.5\")   Wt 58.1 kg (128 lb)   LMP  (LMP Unknown)   SpO2 99%   BMI 25.42 kg/m   Estimated body mass index is 25.42 kg/m  as calculated from the following:    Height as of this encounter: 1.511 m (4' 11.5\").    Weight as of this encounter: 58.1 kg (128 lb).    Medication Reconciliation: complete  ESS 7  Ablertina Iverson MA   "

## 2025-01-08 ENCOUNTER — DOCUMENTATION ONLY (OUTPATIENT)
Dept: SLEEP MEDICINE | Facility: CLINIC | Age: 70
End: 2025-01-08
Payer: MEDICARE

## 2025-01-08 DIAGNOSIS — G47.33 OBSTRUCTIVE SLEEP APNEA (ADULT) (PEDIATRIC): ICD-10-CM

## 2025-01-08 DIAGNOSIS — M25.562 CHRONIC PAIN OF LEFT KNEE: ICD-10-CM

## 2025-01-08 DIAGNOSIS — G47.33 OBSTRUCTIVE SLEEP APNEA: Primary | ICD-10-CM

## 2025-01-08 DIAGNOSIS — G89.29 CHRONIC PAIN OF LEFT KNEE: ICD-10-CM

## 2025-01-08 RX ORDER — CELECOXIB 100 MG/1
100 CAPSULE ORAL DAILY
Qty: 100 CAPSULE | Refills: 0 | Status: CANCELLED | OUTPATIENT
Start: 2025-01-08

## 2025-01-08 NOTE — PROGRESS NOTES
Patient was offered choice of vendor and chose [facility].  Patient Suzan Ballard was set up at [facility] on January 8, 2025. Patient received a Resmed Airsense 10 Pressures were set at  5-8 cm H2O.   Patient s ramp is 5 cm H2O for Auto and FLEX/EPR is EPR, 2.  Patient received a Resmed Mask name: AirFit P30i Pillow mask size Small, heated tubing and heated humidifier.  Patient has the following compliance requirements: none.  JEREL FARIAS

## 2025-01-08 NOTE — TELEPHONE ENCOUNTER
FAX [facility] Prescription Refill      celecoxib (CELEBREX) 100 MG capsule         Last Visit with PCP = 07/12/24  Next Visit with PCP = 04/18/25

## 2025-01-11 ENCOUNTER — HEALTH MAINTENANCE LETTER (OUTPATIENT)
Age: 70
End: 2025-01-11

## 2025-01-15 RX ORDER — CELECOXIB 100 MG/1
100 CAPSULE ORAL DAILY
Qty: 30 CAPSULE | Refills: 0 | Status: SHIPPED | OUTPATIENT
Start: 2025-01-15

## 2025-02-09 ENCOUNTER — HEALTH MAINTENANCE LETTER (OUTPATIENT)
Age: 70
End: 2025-02-09

## 2025-02-09 ENCOUNTER — TRANSFERRED RECORDS (OUTPATIENT)
Dept: MULTI SPECIALTY CLINIC | Facility: CLINIC | Age: 70
End: 2025-02-09

## 2025-02-09 LAB — RETINOPATHY: NORMAL

## 2025-02-13 DIAGNOSIS — G89.29 CHRONIC PAIN OF LEFT KNEE: ICD-10-CM

## 2025-02-13 DIAGNOSIS — M25.562 CHRONIC PAIN OF LEFT KNEE: ICD-10-CM

## 2025-02-13 RX ORDER — CELECOXIB 100 MG/1
100 CAPSULE ORAL DAILY
Qty: 90 CAPSULE | Refills: 0 | Status: CANCELLED | OUTPATIENT
Start: 2025-02-13

## 2025-02-15 NOTE — TELEPHONE ENCOUNTER
Call patient    She is requesting a refill earlier than usual    If she having new pain or has her pre-existing pain gotten worse?

## 2025-02-19 DIAGNOSIS — E11.9 TYPE 2 DIABETES MELLITUS WITHOUT COMPLICATION, WITHOUT LONG-TERM CURRENT USE OF INSULIN (H): ICD-10-CM

## 2025-02-19 DIAGNOSIS — I10 ESSENTIAL HYPERTENSION: ICD-10-CM

## 2025-02-19 DIAGNOSIS — E03.9 ACQUIRED HYPOTHYROIDISM: ICD-10-CM

## 2025-02-19 RX ORDER — LEVOTHYROXINE SODIUM 88 UG/1
88 TABLET ORAL DAILY
Qty: 90 TABLET | Refills: 0 | Status: SHIPPED | OUTPATIENT
Start: 2025-02-19

## 2025-02-19 NOTE — CONFIDENTIAL NOTE
Reason for visit: Meningioma (H)   Seizure disorder (H)     Additional Information:   meningiomas. History of seizures. Patient interested in stopping seizure medication    Referring Provider: Narinder Sauer MD   [facility]   Office Visit Notes: 12/16/2024       All IMAGING   STATUS/LOCATION   DATE   MRI/MRA Internal  12/2/2024, 12/19/2023 11/1/2023, 11/4/2018 08/13/2017, 02/2/2017 09/23/2015 , 08/5/2014    CT/CTA Internal  05/12/2016    LABS Internal, External    EEG Internal 03/05/2013, 09/29/2014     EMG N/A    NEUOROPSYCH   TEST: Internal  Lia Swanson, NIKO  05/23/2013

## 2025-02-20 ENCOUNTER — TELEPHONE (OUTPATIENT)
Dept: NEUROLOGY | Facility: CLINIC | Age: 70
End: 2025-02-20
Payer: MEDICARE

## 2025-02-20 RX ORDER — AMLODIPINE BESYLATE 10 MG/1
TABLET ORAL
Qty: 60 TABLET | Refills: 0 | Status: SHIPPED | OUTPATIENT
Start: 2025-02-20

## 2025-02-20 RX ORDER — METFORMIN HYDROCHLORIDE 500 MG/1
500 TABLET, EXTENDED RELEASE ORAL
Qty: 60 TABLET | Refills: 0 | Status: SHIPPED | OUTPATIENT
Start: 2025-02-20

## 2025-02-20 NOTE — TELEPHONE ENCOUNTER
Called waitlist patient and offered an appointment with Lencho on this date 2-20-25 & time 11a at this location [place]      Please note there is no guarantee this appointment will be available as it is first come first serve.

## 2025-04-07 ENCOUNTER — PRE VISIT (OUTPATIENT)
Dept: NEUROLOGY | Facility: CLINIC | Age: 70
End: 2025-04-07

## 2025-04-09 ENCOUNTER — PATIENT OUTREACH (OUTPATIENT)
Dept: CARE COORDINATION | Facility: CLINIC | Age: 70
End: 2025-04-09
Payer: MEDICARE

## 2025-04-21 ENCOUNTER — PATIENT OUTREACH (OUTPATIENT)
Dept: CARE COORDINATION | Facility: CLINIC | Age: 70
End: 2025-04-21
Payer: MEDICARE

## 2025-04-23 ENCOUNTER — PATIENT OUTREACH (OUTPATIENT)
Dept: CARE COORDINATION | Facility: CLINIC | Age: 70
End: 2025-04-23
Payer: MEDICARE

## 2025-04-30 PROBLEM — M85.89 OSTEOPENIA OF MULTIPLE SITES: Status: ACTIVE | Noted: 2025-04-30

## 2025-05-06 DIAGNOSIS — E11.9 TYPE 2 DIABETES MELLITUS WITHOUT COMPLICATION, WITHOUT LONG-TERM CURRENT USE OF INSULIN (H): ICD-10-CM

## 2025-05-07 ENCOUNTER — RESULTS FOLLOW-UP (OUTPATIENT)
Dept: PEDIATRICS | Facility: CLINIC | Age: 70
End: 2025-05-07
Payer: MEDICARE

## 2025-05-07 DIAGNOSIS — E03.9 ACQUIRED HYPOTHYROIDISM: ICD-10-CM

## 2025-05-07 RX ORDER — METFORMIN HYDROCHLORIDE 500 MG/1
500 TABLET, EXTENDED RELEASE ORAL
Qty: 90 TABLET | Refills: 0 | Status: SHIPPED | OUTPATIENT
Start: 2025-05-07

## 2025-05-07 RX ORDER — LEVOTHYROXINE SODIUM 100 UG/1
88 TABLET ORAL DAILY
Qty: 90 TABLET | Refills: 1 | Status: SHIPPED | OUTPATIENT
Start: 2025-05-07

## 2025-05-07 NOTE — TELEPHONE ENCOUNTER
4/18/25 result note    Your A1c (diabetes screen) is unchanged and I would continue on the metformin for now

## 2025-05-19 ENCOUNTER — TELEPHONE (OUTPATIENT)
Dept: GASTROENTEROLOGY | Facility: CLINIC | Age: 70
End: 2025-05-19
Payer: MEDICARE

## 2025-05-19 NOTE — TELEPHONE ENCOUNTER
"Endoscopy Scheduling Screen    Caller: patient    Have you had any respiratory illness or flu-like symptoms in the last 10 days?  No    What is your communication preference for Instructions and/or Bowel Prep?   MyChart    What insurance is in the chart?  Other:  Medicare    Ordering/Referring Provider:   WILLIAM HAIDER    (If ordering provider performs procedure, schedule with ordering provider unless otherwise instructed. )    BMI: Estimated body mass index is 26.46 kg/m  as calculated from the following:    Height as of 4/18/25: 1.499 m (4' 11\").    Weight as of 4/18/25: 59.4 kg (131 lb).     Sedation Ordered  moderate sedation.   If patient BMI > 50 do not schedule in [facility].    If patient BMI > 45 do not schedule at [facility].    Are you taking methadone or Suboxone?  NO, No RN review required.    Have you been diagnosed and are being treated for severe PTSD or severe anxiety?  NO, No RN review required.    Are you taking any prescription medications for pain 3 or more times per week?   NO, No RN review required.    Do you have a history of malignant hyperthermia?  No    (Females) Are you currently pregnant?   No     Have you been diagnosed or told you have pulmonary hypertension?   No    Do you have an LVAD?  No    Have you been told you have moderate to severe sleep apnea?  Yes. Do you use a CPAP? Yes Where is the patient located?. (RN Review required for scheduling unless scheduling in Hospital.)     Have you been told you have COPD, asthma, or any other lung disease?  No    Has your doctor ordered any cardiac tests like echo, angiogram, stress test, ablation, or EKG, that you have not completed yet?  No    Do you  have a history of any heart conditions?  No     Have you ever had or are you waiting for an organ transplant?  No. Continue scheduling, no site restrictions.    Have you had a stroke or transient ischemic attack (TIA aka \"mini stroke\") in the last 2 years?   No.    Have you been diagnosed with or been " "told you have cirrhosis of the liver?   No.    Are you currently on dialysis?   No    Do you need assistance transferring?   No    BMI: Estimated body mass index is 26.46 kg/m  as calculated from the following:    Height as of 4/18/25: 1.499 m (4' 11\").    Weight as of 4/18/25: 59.4 kg (131 lb).     Is patients BMI > 40 and scheduling location [facility]?  No    Do you take an injectable or oral medication for weight loss or diabetes (excluding insulin)?  No    Do you take the medication Naltrexone?  No    Do you take blood thinners?  No       Prep   Are you currently on dialysis or do you have chronic kidney disease?  No    Do you have a diagnosis of diabetes?  Yes (Golytely Prep)    Do you have a diagnosis of cystic fibrosis (CF)?  No    On a regular basis do you go 3 -5 days between bowel movements?  No    BMI > 40?  No    Preferred Pharmacy:        [id number]  [place], [place] - [address] [place] [facility] [address] [place] [address]  Phone: [phone] Fax: [phone]      Final Scheduling Details     Procedure scheduled  Colonoscopy    Surgeon:  Giuliana     Date of procedure:  6/4     Pre-OP / PAC:   No - Not required for this site.    Location  [facility] - Per exclusion criteria.    Sedation   Moderate Sedation - Per order.      Patient Reminders:   You will receive a call from a Nurse to review instructions and health history.  This assessment must be completed prior to your procedure.  Failure to complete the Nurse assessment may result in the procedure being cancelled.      On the day of your procedure, please designate an adult(s) who can drive you home stay with you for the next 24 hours. The medicines used in the exam will make you sleepy. You will not be able to drive.      You cannot take public transportation, ride share services, or non-medical taxi service without a responsible caregiver.  Medical transport services are allowed with the requirement that a " responsible caregiver will receive you at your destination.  We require that drivers and caregivers are confirmed prior to your procedure.

## 2025-05-20 ENCOUNTER — THERAPY VISIT (OUTPATIENT)
Dept: OCCUPATIONAL THERAPY | Facility: CLINIC | Age: 70
End: 2025-05-20
Attending: PSYCHIATRY & NEUROLOGY
Payer: MEDICARE

## 2025-05-20 DIAGNOSIS — R41.89 COGNITIVE IMPAIRMENT: ICD-10-CM

## 2025-05-20 PROCEDURE — 97537 COMMUNITY/WORK REINTEGRATION: CPT | Mod: GO

## 2025-05-20 PROCEDURE — 96125 COGNITIVE TEST BY HC PRO: CPT | Mod: GO

## 2025-05-20 PROCEDURE — 97535 SELF CARE MNGMENT TRAINING: CPT | Mod: GO

## 2025-05-20 PROCEDURE — 97165 OT EVAL LOW COMPLEX 30 MIN: CPT | Mod: GO

## 2025-05-20 NOTE — PROGRESS NOTES
Cognitive Performance Test    SUMMARY OF TEST:    The Cognitive Performance Test (CPT) is a standardized performance-based assessment to measure working memory/executive function processing capacities that underlie functional performance. Subtasks include common basic and instrumental activities of daily living (ADL/IADL) which are rated based on the manner in which patients respond to task demands of varying complexity. The total CPT score describes a level of functioning that indicates how information is processed, implications for functional activities, potential safety risks and a recommended level of supervision or assist based on cognitive function. The highest total score on this test is in the range of 5.6 to 5.8.    DATE OF TESTIN2025    Ordering Provider:Greer Musa DO    Order date:  25    Order Diagnosis: Cognitive impairment [R41.89]  - Primary      Occupational Profile (including diagnosis and medical history):    estephania is a 69 year old female with history of recurrent meningiomoas, s/p multiple resections, s/p Gamma knife most recently in 2023. She had seizures following the initial resection, and was followed by neurology with imaging every two years. Her meningiomas had increased slightly over that time, but not dramatically. In 2012, however, she suffered a sudden onset of weakness of her right upper extremity, as well as some confusion which necessitated emergency admission on 2012. The meningiomas were found to have increased substantially and given the rapid growth, a craniotomy and gross total resection of her tumor occurred on 2012 with pathology of both sites in the left parieto-occipital region, inferior and left parieto-occiptal region meningiomas indicating the WHO grade 1. She has not been able to return to work since her most recent surgery in November, and has had concerns about cognitive changes. She has difficulty with memory and multitasking.  "She was seen by Dr. Swanson for neuropsychological evaluation 5/8/2013. Per neuropsych evaluation: \"Given her cognitive difficulties, it would be prudent to consider having her undergo a formal 's assessment, such as that offered at the [facility], prior to resuming driving, once it is deemed to be medically safe.\"Per chart, patient started to have seizure episodes after resection in 1995. She has not had any episodes of this since second resection in 2012.     Pt was most recently seen by neurology with Dr. Musa 4/7/25 who referred her to outpatient occupational therapy for administration of The Cognitive Performance Test and returning to driving. Patient presented to session independently. She states she has not driven since 2012. She states her kids want her to go back to driving to allow more independence. She is unsure of returning to driving as it has been a long time and the rules may have changed. She reports some forgetfulness but attributes this to age-related changes. She feels like to cognition/memory has been stable since last surgery. She acknowledges she forgets to close cupboards in the kitchen. She may forget why she went into a room. She lives with her daughter and she is independent in IADL's (meal prep, cleaning/laundry, finances, medications, gardening).       Previous cognitive screens completed in OT or by Physician and score: n/a    Functional History Interview:    Informants: Patient presented to session independently      Client s perception of memory/ thinking or functional difficulties:She feels like her cognition is now stable. She reports some forgetfulness but attributes this to age. She reports some challenges with multi-tasking and processing. She relies heavily on writing things down     Living situation: Pt lives with her daughter in a two story home. Her daughter goes to work and patient stays home alone and feels safe. She goes to [facility] for vacation     Home " maintenance activities: She manages household cleaning/laundry independently     Meal preparation and grocery shopping: Patient and her daughter go grocery shopping together as she is not driving. She prepares meals independently and without concerns with following steps of recipe or leaving stove/oven on     Finances and paying bills: She manges finances independently      Medication management: She sets up her pills in pillbox independently. She occasionally forgets evening medications if distracted or outside of her routine.     Appointments: She manages her own appointments with no issues     Driving and transportation: She states she has not driven since 2012 after her second surgery. She is unsure of returning to driving as it has been a long time and the rules may have changed. She states her kids would like her to return to driving     Leisure and routine activities: She enjoys gardening     ADL/Mobility/Physical Functioning: She is independent in self-cares. She enjoys walking daily     Fall Risk Screen:   Has the patient fallen 2 or more times in the last year? No      Has the patient fallen and had an injury in the past year? No       Timed Up and Go Score: NT    Is the patient a fall risk? No      RESULTS OF TESTING:                                                                                         CPT Subtest Results    MEDBOX: 5.5/6 SHOP/GLOVES: 5/6 PHONE: 6/6   WASH:  6/5 TRAVEL: 5/6 TOAST: 5/5   DRESS: NT/5   TOTAL CPT SCORE:  31.534     Average CPT Score  5.25/5.6    INTERPRETATION OF TEST RESULTS:    Based on the Cognitive Performance Test, this patient scored at CPT Level 5.0.  See CPT Levels reference below.    Summary of functional cognitive status:   Results reveals mild functional decline. Start of difficulty with complex tasks - Shows start of decline in memory, keeping track of details, decision-making, planning, and organization. Can learn new information. Individuals at this level are  able to complete basic daily and routine household tasks but requires assistance for more complex tasks such as complex or new medication schedules, appointments, finances, higher level household tasks, etc. Caregiver may need to: urge patient to use notes, daily checklists, calendars or there reminders; make tasks easier and limit tasks that are too hard; allow extra time; monitor, supervise or help with complex tasks.        Factors affecting performance:  Possible cultural/language barrier    Recommendations:    Assist for ADL/IADL:  Driving  Supervision for ADL/IADL:  new/complex medications or higher level life planning/decisions  Supervision in living setting:  Weekly checks            It is recommended that patient receive oversight/assistance complex tasks including new or complex medication set up and schedule and higher level life planning/decision-making. It is recommended patient receive assistance for driving until formal behind the wheel test as been completed. Individuals at this level are likely able to live alone with caregiver check-ins weekly.  Recommend coupling medication schedule with daily routine, using notes, daily checklists, calendars or other reminders; avoid multi-tasking; make tasks easier and limit tasks that are too hard; allow extra time; monitor, supervise or help with complex tasks.                                                     TIME ADMINISTERING TEST: 30    TIME FOR INTERPRETATION AND PREPARATION OF REPORT: 25    TOTAL TIME: 55      CPT Levels Reference:    Patient's Average CPT Score:  5.0                                                                                                                                                  Individual scores range along a continuum as outlined below.  In addition to cognitive status, other factors may affect safety in a home environment.  Please refer to specific recommendations for this patient.    ___5.6-5.8  Normal functioning  "(absence of cognitive-functional disability).  Independent in managing personal affairs, monitors and directs own behavior.  Uses complex information to carry out daily activities with safety and accuracy.    Proficient with instrumental activities of daily living (IADL) and learning new activity.  Problems are anticipated, errors are avoided, and consequences of actions are considered.      _x__5.0   Mild cognitive-functional disability; deficits in working memory and executive thought processes. Difficulty using complex information. Problems may be observed with recent memory, judgment, reasoning and planning ahead. May be impulsive or have difficulty anticipating consequences.  Safety:  May require assistance to plan ahead; or to manage complex medication schedules, appointments or finances.  Hazardous activities may need to be monitored or limited.  ADL:  Mild functional decline.  Able to complete basic self-care and routine household tasks.  May have difficulty with complex daily tasks such as reading, writing, meal preparation, shopping or driving.   Learns through hands on teaching. Self-centered behavior or difficulty considering the needs of others may be seen related to trouble seeing the  whole picture\". Can appear disorganized or uninhibited.    ___4.5  Mild to moderate cognitive-functional disability. Significant deficits in working memory and executive thought processes. Judgment, reasoning and planning show obvious impairment.  Distractible with inability to shift attention/actions given competing stimuli.  Difficulty with problem solving and managing details. Complex daily tasks performed with inconsistency, difficulty, or error.     Safety:  Medications should be monitored, stove use may require supervision, and driving ability may be affected.  Impaired safety awareness with inability to anticipate potential problems.  May not recognize or respond to emergent situations. Requires frequent check-in " support.   ADL:  Mild difficulty with simple everyday self-care tasks. Benefits from structured, routine activity.  Will likely need reminders to complete tasks outside of the routine. Requires assistance with planning and IADL tasks like shopping and finances. Learns concrete tasks through repetition, but performance may not generalize. Tends to be impulsive with poor insight. Self centered behavior or inability to consider the needs of others is common.    ___4.0  Moderate cognitive-functional disability; abstract to concrete thought processes. Working memory and executive function impairments are obvious. Difficulty with planning and problem solving.  Behavior is goal-directed, but unable to follow multi-step directions, is easily distracted, and may not recognize mistakes.  Inability to anticipate hazards or understand precautions.  Safety:  Recommend 24-hour supervision for safety. Supervision needed for medication management and for hazardous activities. May not be able to follow a restricted diet. Can get lost in unfamiliar surroundings. Generally, persons functioning at level 4 should not be driving.   ADL:  Some decline in quality or frequency of ADL.  Grahn enhanced by use of a routine, simple concrete directions, and caregiver set-up of needed items. Complex tasks such as money or home management typically requires assistance.  Relies heavily on vision to guide behavior; will ignore objects/hazards not in plain sight and can be distracted by irrelevant objects. Often has poor insight.  Able to carry out social conversation and may verbally  cover  for deficits leading caregivers to believe they are capable of functioning independently.       ___3.5  Moderate cognitive-functional disability; increased cues needed for task completion. Aware of concrete task steps but needs prompting or cues to initiate and complete simple tasks. Attention span is limited, simple directions may need to be repeated,  and re-focus to a topic or task may be required.  Safety:  24-hour supervision required for safety and for assistance with daily tasks. Assistance required with medications, and access to medication should be limited. Meals, nutrition and dietary restrictions need to be monitored.  All hazardous activities should be restricted or supervised. Should not drive. Prone to wandering and can become lost.  ADL:  Moderate functional decline. Familiar tasks usually requires set-up of supplies and directions to complete steps. May need objects handed to them for task initiation. Function best with a set schedule in familiar surroundings with familiar people. All complex tasks must be done by others. Vocabulary is diminished and speech often unfocused.

## 2025-05-20 NOTE — PROGRESS NOTES
"OCCUPATIONAL THERAPY EVALUATION  Type of Visit: Evaluation        Fall Risk Screen:  Have you fallen 2 or more times in the past year?: No  Have you fallen and had an injury in the past year?: No    Subjective        Presenting condition or subjective complaint: short memor loss  Date of onset: 04/07/25 (order date)    Relevant medical history:   - Hypertension, sleep apnea, hyperlipidemia, thyroid disease, diabetes mellitus, seizures, recurrent meningiomas  Dates & types of surgery: meningioma surgery    Prior diagnostic imaging/testing results:       Prior therapy history for the same diagnosis, illness or injury: Yes        Patient is a 69 year old female with history of recurrent meningiomoas, s/p multiple resections, s/p Gamma knife most recently in 12/2023. She had seizures following the initial resection, and was followed by neurology with imaging every two years. Her meningiomas had increased slightly over that time, but not dramatically. In November 2012, however, she suffered a sudden onset of weakness of her right upper extremity, as well as some confusion which necessitated emergency admission on 11/9/2012. The meningiomas were found to have increased substantially and given the rapid growth, a craniotomy and gross total resection of her tumor occurred on 11/12/2012 with pathology of both sites in the left parieto-occipital region, inferior and left parieto-occiptal region meningiomas indicating the WHO grade 1. She has not been able to return to work since her most recent surgery in November, and has had concerns about cognitive changes. She has difficulty with memory and multitasking. She was seen by Dr. Swanson for neuropsychological evaluation 5/8/2013. Per neuropsych evaluation: \"Given her cognitive difficulties, it would be prudent to consider having her undergo a formal 's assessment, such [facility], prior to resuming driving, once it is deemed to be medically " "safe.\"Per chart, patient started to have seizure episodes after resection in 1995. She has not had any episodes of this since second resection in 2012.     Pt was most recently seen by neurology with Dr. Musa 4/7/25 who referred her to outpatient occupational therapy for administration of The Cognitive Performance Test and returning to driving. Patient presented to session independently. She states she has not driven since 2012. She states her kids want her to go back to driving to allow more independence. She is unsure of returning to driving as it has been a long time and the rules may have changed. She reports some forgetfulness but attributes this to age-related changes. She feels like to cognition/memory has been stable since last surgery. She acknowledges she forgets to close cupboards in the kitchen. She may forget why she went into a room. She lives with her daughter and she is independent in IADL's (meal prep, cleaning/laundry, finances, medications, gardening).       Prior Level of Function  Transfers: Independent  Ambulation: Independent  ADL: Independent  IADL: Finances, Housekeeping, Laundry, Meal preparation, Medication management    Living Environment  Social support: With family members   Type of home: House; 2-story; Multi-level; Basement   Stairs to enter the home: Yes 16 Is there a railing: Yes     Ramp: No   Stairs inside the home: Yes 16 Is there a railing: Yes     Help at home: Assist for driving and community activities  Equipment owned:       Employment: No    Hobbies/Interests: reading walking shopping    Patient goals for therapy:  memory and assess skills for safe driving     Pain assessment: Pain denied     Objective   Cognitive Status Examination  Orientation: Oriented to person, place and time   Level of Consciousness: Alert  Follows Commands and Answers Questions: 100% of the time  Personal Safety and Judgement: Intact, wants to be sure she is safe to return to driving.   Memory: " "reports some forgetfulness    Client s perception of memory/ thinking or functional difficulties:She feels like her cognition is now stable. She reports some forgetfulness but attributes this to age. She reports some challenges with multi-tasking and processing. She relies heavily on writing things down    CPT performed this date, see separate note for details    Pre-Driving Clinical Assessments:   Symbol Digit Modality Test: 30 (WFL; less than 25 indicates impairment)    Dynavision assesses visual scanning, peripheral visual awareness, visual attention, and visual-motor reaction time active visual field. Trial 1 Mode A 45 hits/60\" - BNL; norm: 52+ is considered a safe )     Traffic Sign Recognition: 8/12 (BNL)    Central Visual Scan: 5 (BNL; 6-9 is considered average for demographics)    Right Foot Tap Measure requires the patient to tap R foot 10 times, alternating between R and L side of barrier on floor (about 12 inches between tapping sites). Average time is approximately 5-6 seconds. Score: 6.23 sec (BNL)    Trail Making B: 2 minutes 16 seconds   VISUAL SKILLS  Visual Acuity: wears readers  Visual Field: Appears normal  Visual Attention: Appears normal  Oculomotor: appears normal     RANGE OF MOTION: UE AROM WFL  STRENGTH: UE Strength WFL  MUSCLE TONE: WNL  COORDINATION: WFL  BALANCE: denies issues    FUNCTIONAL MOBILITY  Assistive Device(s): None  Ambulation: IND    SELF-CARES: independent     INSTRUMENTAL ACTIVITIES OF DAILY LIVING (IADL):    Living situation: Pt lives with her daughter in a two story home. Her daughter goes to work and patient stays home alone and feels safe. She goes to [facility] for vacation     Home maintenance activities: She manages household cleaning/laundry independently     Meal preparation and grocery shopping: Patient and her daughter go grocery shopping together as she is not driving. She prepares meals independently and without concerns with following steps of recipe or " leaving stove/oven on     Finances and paying bills: She manges finances independently      Medication management: She sets up her pills in pillbox independently. She occasionally forgets evening medications if distracted or outside of her routine.     Appointments: She manages her own appointments with no issues     Driving and transportation: She states she has not driven since 2012 after her second surgery. She is unsure of returning to driving as it has been a long time and the rules may have changed. She states her kids would like her to return to driving     Leisure and routine activities: She enjoys gardening     ADL/Mobility/Physical Functioning: She is independent in self-cares. She enjoys walking daily       Assessment & Plan   CLINICAL IMPRESSIONS  Medical Diagnosis: Cognitive impairment (R41.89)  - Primary    Treatment Diagnosis: cognitive change    Impression/Assessment: Pt is a 69 year old female presenting to Occupational Therapy for return to driving and administration of Cognitive Performance Test. Results of The Cognitive Performance Test reveals a score of 5.25/5.6, Level 5.0 - indicating mild cognitive-functional decline. Results of pre-driving clinical screens falls below safe driving limits (see above).  A formal behind the wheel test is recommended due to results of pre-driving screen and length of time since she has driven. Patient is in agreement and verbalizes understanding of results and recommendations. She states she may want to try to return to drive next year. She states her children would like her to return to driving, but it is not necessarily a goal of hers.      The following significant findings have been identified: Impaired cognition.  These identified deficits interfere with their ability to perform medications, driving as compared to previous level of function.     Clinical Decision Making (Complexity):  Assessment of Occupational Performance: 1-3 Performance  Deficits  Occupational Performance Limitations: driving, medication management, higher order IADL's  Clinical Decision Making (Complexity): Low complexity    PLAN OF CARE  Treatment Interventions:  Interventions: Community/Work Reintegration, Self-Care/Home Management, Therapeutic Activity    Long Term Goals   OT Goal 1  Goal Identifier: CPT  Goal Description: Patient and family to verbalize understanding of Cognitive Performance Test results and recommendations to guide levels of support for activities of varying complexity to improve client participation and quality of life and minimize functional impact of cognitive changes  Target Date: 05/20/25  Date Met: 05/20/25  OT Goal 2  Goal Identifier: Pre-driving  Goal Description: Patient will complete  readiness screening targeting visual scanning, reaction time, divided attention, and problem solving and verbalize understanding of results and recommendations to ensure safety for community mobility (i.e. driving, crossing the street, pathfinding, shopping, etc.)  Goal Progress: goal met; patient falls below safe driving limits on pre-driving screens and a formal behind the wheel test is recommended  Target Date: 05/20/25  Date Met: 05/20/25      Frequency of Treatment: 1 visit  Duration of Treatment: 1 visit     Recommended Referrals to Other Professionals: formal behind the wheel testing before cleared to drive  Education Assessment: Learner/Method: Patient;Listening;Demonstration     Risks and benefits of evaluation/treatment have been explained.   Patient/Family/caregiver agrees with Plan of Care.     Evaluation Time:    OT Eval, Low Complexity Minutes (85584): 15    Signing Clinician: LEROY Sandra/L, CNS, CSRS        [facility]                                                                                   OUTPATIENT OCCUPATIONAL THERAPY      PLAN OF TREATMENT FOR OUTPATIENT REHABILITATION   Patient's Last Name, First  Name, TAVIA BallardSuzan YOB: 1955   Provider's Name   [facility]   Medical Record No.  [id number]     Onset Date: 04/07/25 (order date) Start of Care Date: 05/20/25     Medical Diagnosis:  Cognitive impairment (R41.89)  - Primary      OT Treatment Diagnosis:  cognitive change Plan of Treatment  Frequency/Duration:1 visit/1 visit    Certification date from 05/20/25   To 05/20/25        See note for plan of treatment details and functional goals     Yusra Perez, OTR/L, CNS, CSRS                         I CERTIFY THE NEED FOR THESE SERVICES FURNISHED UNDER        THIS PLAN OF TREATMENT AND WHILE UNDER MY CARE     (Physician attestation of this document indicates review and certification of the therapy plan).              Referring Provider:  Greer Musa    Initial Assessment  See Epic Evaluation- 05/20/25

## 2025-06-01 DIAGNOSIS — E78.2 MIXED HYPERLIPIDEMIA: ICD-10-CM

## 2025-06-01 NOTE — TELEPHONE ENCOUNTER
FAX [facility] Prescription Refill Request    Last Visit with PCP = ADEOLA 04/18/2025    simvastatin (ZOCOR) 20 MG tablet   Last Fill Date: 02/27/2025

## 2025-06-04 ENCOUNTER — HOSPITAL ENCOUNTER (OUTPATIENT)
Facility: CLINIC | Age: 70
Discharge: HOME | End: 2025-06-04
Attending: INTERNAL MEDICINE | Admitting: INTERNAL MEDICINE
Payer: MEDICARE

## 2025-06-04 VITALS
SYSTOLIC BLOOD PRESSURE: 116 MMHG | HEIGHT: 59 IN | HEART RATE: 55 BPM | OXYGEN SATURATION: 96 % | BODY MASS INDEX: 26.46 KG/M2 | DIASTOLIC BLOOD PRESSURE: 68 MMHG | RESPIRATION RATE: 15 BRPM

## 2025-06-04 LAB
COLONOSCOPY: NORMAL
GLUCOSE BLDC GLUCOMTR-MCNC: 107 MG/DL (ref 70–99)

## 2025-06-04 PROCEDURE — G0500 MOD SEDAT ENDO SERVICE >5YRS: HCPCS | Performed by: INTERNAL MEDICINE

## 2025-06-04 PROCEDURE — 250N000011 HC RX IP 250 OP 636: Mod: JZ | Performed by: INTERNAL MEDICINE

## 2025-06-04 PROCEDURE — 45380 COLONOSCOPY AND BIOPSY: CPT

## 2025-06-04 PROCEDURE — 88305 TISSUE EXAM BY PATHOLOGIST: CPT | Mod: 26 | Performed by: PATHOLOGY

## 2025-06-04 PROCEDURE — 88305 TISSUE EXAM BY PATHOLOGIST: CPT | Mod: TC | Performed by: INTERNAL MEDICINE

## 2025-06-04 PROCEDURE — 82962 GLUCOSE BLOOD TEST: CPT

## 2025-06-04 PROCEDURE — 45385 COLONOSCOPY W/LESION REMOVAL: CPT | Performed by: INTERNAL MEDICINE

## 2025-06-04 RX ORDER — FLUMAZENIL 0.1 MG/ML
0.2 INJECTION, SOLUTION INTRAVENOUS
Status: CANCELLED | OUTPATIENT
Start: 2025-06-04 | End: 2025-06-04

## 2025-06-04 RX ORDER — NALOXONE HYDROCHLORIDE 0.4 MG/ML
0.2 INJECTION, SOLUTION INTRAMUSCULAR; INTRAVENOUS; SUBCUTANEOUS
Status: CANCELLED | OUTPATIENT
Start: 2025-06-04

## 2025-06-04 RX ORDER — FENTANYL CITRATE 50 UG/ML
INJECTION, SOLUTION INTRAMUSCULAR; INTRAVENOUS PRN
Status: DISCONTINUED | OUTPATIENT
Start: 2025-06-04 | End: 2025-06-04 | Stop reason: HOSPADM

## 2025-06-04 RX ORDER — ONDANSETRON 2 MG/ML
4 INJECTION INTRAMUSCULAR; INTRAVENOUS EVERY 6 HOURS PRN
Status: CANCELLED | OUTPATIENT
Start: 2025-06-04

## 2025-06-04 RX ORDER — ONDANSETRON 4 MG/1
4 TABLET, ORALLY DISINTEGRATING ORAL EVERY 6 HOURS PRN
Status: CANCELLED | OUTPATIENT
Start: 2025-06-04

## 2025-06-04 RX ORDER — PROCHLORPERAZINE MALEATE 5 MG/1
5 TABLET ORAL EVERY 6 HOURS PRN
Status: CANCELLED | OUTPATIENT
Start: 2025-06-04

## 2025-06-04 RX ORDER — NALOXONE HYDROCHLORIDE 0.4 MG/ML
0.4 INJECTION, SOLUTION INTRAMUSCULAR; INTRAVENOUS; SUBCUTANEOUS
Status: CANCELLED | OUTPATIENT
Start: 2025-06-04

## 2025-06-04 ASSESSMENT — ACTIVITIES OF DAILY LIVING (ADL)
ADLS_ACUITY_SCORE: 42

## 2025-06-04 NOTE — H&P
Gastroenterology Pre-op History and Physical    Suzan Ballard MRN# [id number]   Age: 69 year old YOB: 1955      Date of Surgery: 06/04/25  [facility]      Date of Exam 6/4/2025 Facility Same Day       Primary care provider: Angela Lacey         Chief Complaint and/or Reason for Procedure:   68 yo female for routine screening colonoscopy. Normal exam 10 yrs ago. No FH or anticoagulation. No symptoms.         Past Medical and Surgical History:     Past Medical History:   Diagnosis Date    Diabetes (H)     Disorder of thyroid     Gastroesophageal reflux disease     History of meningioma     causes seizures    Hyperlipidemia     Hypertension     PONV (postoperative nausea and vomiting)     Seizures (H)     Sleep apnea      Past Surgical History:   Procedure Laterality Date    CARPAL TUNNEL RELEASE RT/LT Bilateral     CHOLECYSTECTOMY, LAPOROSCOPIC      CRANIOTOMY  1995    CRANIOTOMY  2012    PARTIAL KNEE ARTHROPLASTY      PHLEBECTOMY MULTIPLE STAB Left 6/13/2024    Procedure: STAB PHLEBECTOMY, VARICOSE VEIN;  Surgeon: Thierry Lobo MD;  Location: [facility]            Medications (include herbals and vitamins):        Medications Prior to Admission   Medication Sig Dispense Refill Last Dose/Taking    acetaminophen (TYLENOL ARTHRITIS PAIN) 650 MG CR tablet Take 650 mg by mouth as needed.   More than a month    ALPRAZolam (XANAX) 0.25 MG tablet Take 1 tablet (0.25 mg) by mouth daily as needed for anxiety 8 tablet 0 More than a month    amLODIPine (NORVASC) 10 MG tablet TAKE 1 TABLETS(10 MG) BY MOUTH DAILY 60 tablet 0 6/4/2025 Morning    aspirin 81 MG EC tablet Take 81 mg by mouth daily   More than a month    bisacodyl (DULCOLAX) 5 MG EC tablet Take 2 tablets at 3 pm the day before your procedure. If your procedure is before 11 am, take 2 additional tablets at 11 pm. If your procedure is after 11 am, take 2 additional tablets at 6 am. For additional  "instructions refer to your colonoscopy prep instructions. 4 tablet 0 6/3/2025    Calcium Carbonate-Vitamin D (CALCIUM 500 + D PO) Take 1 tablet by mouth daily.   Past Week    fluticasone (FLONASE) 50 MCG/ACT spray USE 1 SPRAY IN EACH NOSTRIL EVERY DAY; SHAKE BEFORE USE   More than a month    levothyroxine (SYNTHROID/LEVOTHROID) 100 MCG tablet Take 1 tablet (100 mcg) by mouth daily. 90 tablet 1 6/4/2025 Morning    meclizine 25 MG CHEW Take 1 tablet (25 mg) by mouth every 6 hours as needed for dizziness 90 tablet 0 More than a month    metFORMIN (GLUCOPHAGE XR) 500 MG 24 hr tablet Take 1 tablet (500 mg) by mouth daily (with dinner). 90 tablet 0 Past Week    polyethylene glycol (GOLYTELY) 236 g suspension The night before the exam at 6 pm drink an 8-ounce glass every 15 minutes until the jug is half empty. If you arrive before 11 AM: Drink the other half of the Golytely jug at 11 PM night before procedure. If you arrive after 11 AM: Drink the other half of the Golytely jug at 6 AM day of procedure. For additional instructions refer to your colonoscopy prep instructions. 4000 mL 0 6/3/2025    simvastatin (ZOCOR) 20 MG tablet Take 1 tablet (20 mg) by mouth every evening 90 tablet 1 6/3/2025    Blood Pressure Monitoring (OMRON 5 SERIES BP MONITOR) ADRIENNE 1 each by Other route daily       hydrocortisone 2.5 % ointment Apply topically 2 times daily. 30 g 1              Allergies:      Allergies   Allergen Reactions    Iodinated Contrast Media Rash    Codeine Nausea and Vomiting    Tylenol With Codeine [Acetaminophen-Codeine] Nausea     Dizziness               Physical Exam:   All vitals have been reviewed  Patient Vitals for the past 8 hrs:   BP Pulse Resp SpO2 Height   06/04/25 0740 -- -- -- -- 1.499 m (4' 11\")   06/04/25 0720 115/56 62 16 97 % --     No intake/output data recorded.  Airway assessment:   Patient is able to open mouth wide  Patient is able to stick out tongue  Mallampatti classification: Class I " (visualization of the soft palate, fauces, uvula, anterior and posterior pillars)}      Lungs:   No increased work of breathing, good air exchange, clear to auscultation bilaterally, no crackles or wheezing     Cardiovascular:   regular rate and rhythm and normal S1 and S2                 Anesthetic risk and/or ASA classification:   ASA 2    Héctor Giordano MD

## 2025-06-04 NOTE — OR NURSING
Colonoscopy with lesion removal using snare completed under conscious sedation. Pt tolerated procedure.

## 2025-06-09 LAB
PATH REPORT.COMMENTS IMP SPEC: NORMAL
PATH REPORT.COMMENTS IMP SPEC: NORMAL
PATH REPORT.FINAL DX SPEC: NORMAL
PATH REPORT.GROSS SPEC: NORMAL
PATH REPORT.MICROSCOPIC SPEC OTHER STN: NORMAL
PATH REPORT.RELEVANT HX SPEC: NORMAL
PHOTO IMAGE: NORMAL

## 2025-06-09 RX ORDER — SIMVASTATIN 20 MG
20 TABLET ORAL EVERY EVENING
Qty: 90 TABLET | Refills: 2 | Status: SHIPPED | OUTPATIENT
Start: 2025-06-09

## 2025-06-09 NOTE — TELEPHONE ENCOUNTER
Spoke with pt. States she is still taking this medication, taking one tablet daily. Denies missing any doses.

## 2025-06-10 ENCOUNTER — RESULTS FOLLOW-UP (OUTPATIENT)
Dept: MULTI SPECIALTY CLINIC | Facility: CLINIC | Age: 70
End: 2025-06-10

## 2025-06-18 PROBLEM — D12.6 ADENOMATOUS POLYP OF COLON: Status: ACTIVE | Noted: 2025-06-18

## 2025-06-19 ENCOUNTER — PATIENT OUTREACH (OUTPATIENT)
Dept: GASTROENTEROLOGY | Facility: CLINIC | Age: 70
End: 2025-06-19
Payer: MEDICARE

## 2025-07-08 NOTE — PROGRESS NOTES
Neurology Consultation    Patient Name:  Suzan Ballard  MRN:  [id number]    :  1955  Date of Service:  2025  Primary care provider:  Angela Lacey        Chief Complaint: Follow-up     History of Present Illness:     Suzan Ballard is a 69 year old female who presents for routine follow-up concerning seizure management.  Has not had any seizures.  She completed OT CPT. They discussed doing a BTW if she wanted to start driving again. She has not been driving for over 10 years now and children were more interested in her starting to drive again. She does not want to pursue BTW driving evaluation and will just continue to refrain from driving.     Prior History from 2025:     Started have episodes after resection in .  Episodes occurred at night.  Wake up feeling cold that will progress to shaking in both arms and legs.  She retains awareness during these episodes.  No loss of bowel or bladder control. No oral trauma associated.  She has not had any episodes of this since second resection in .     She denies other types of seizures or neurologic episodes but per review of the chart, in  she was having right arm weakness and clumsiness.  Had a period of unresponsiveness and memory loss.  There is some mention of right arm jerking.  MRI showed reoccurrence of meningioma. Had resection in 2012.  After this resection as above did not have the bilateral arm and leg shaking episodes but was having right sided arm and sometimes leg paresthesias.  Per review of the chart she continued to have this frequently for a couple of years.       She reports that she has not been taking lamotrigine since following up with Dr. Perez in 2018. Review of records shows that this has been repeatedly prescribed by her PCPs over the years; however, she reports that she was not taking the medication even if she picked it up.  She denies having side effects with the lamotrigine; just does not like  taking medications and medication is expensive.       She denies having any seizure like activity being off the medications. Denies episodes of confusion or lost time. No episodes of right arm or leg jerking. Does have some right arm numbness that seems to be positional. If she sleeps on it she will have the numbness and then when she rolls to the other side it resolves.      Reports short term memory loss after her second surgery but denies worsening of this. Feels like her memory is stable.  She is asking about driving clearance. Reportedly Dr. Perez told her she could drive but with restrictions. She has had neuropsychologic testing in the past.      Prior AEDs   - Phenytoin   - Keppra   - Lamotrigine      Review of Records   - Patient was previously being followed by Dr. Perez, last seen in 2018, and Dr. Ledesma in epilepsy clinic, last seen in 2016.   - Recurrent meningiomas, s/p multiple resections, s/p Gamma Knife most recently in 12/2023  - Follows with Dr. Sauer in neurosurgery, last visit in 12/2024.  No change to management, repeat MRI in 1 year      MRI brain 2024: Compared to study from December 2023, slight progressive interval increase in size of presumed meningioma along the posterior aspect of the vertex, further detailed above. Again, mass is closely associated with the superior sagittal sinus with some   degree of invasion likely. While no karla dural venous sinus thrombosis is evident, there is irregular T2 hyperintense signal abnormality within the inferior aspect of the superior sagittal sinus suggesting sluggish flow.  2.  Similar appearance of small presumed meningioma along the right aspect of the interhemispheric falx.  3.  Additional chronic changes as above.  4.  No superimposed acute intracranial abnormality.      Physical Exam:  BP (!) 144/68   Pulse 63   Wt 58.1 kg (128 lb 1.6 oz)   LMP  (LMP Unknown)   SpO2 100%   BMI 25.87 kg/m      General:  No acute  distress  Cardiovascular: Normal rate.  Lung: Respirations are non-labored.  Extremities: Normal range of motion  Integumentary: Warm and dry     Neurologic:  Mental status : alert, fund of knowledge appropriate for visit     LANGUAGE: Speech fluent, no dysarthria      CN:  II- pupils equal and reactive, visual fields full  III, IV, VI- extraocular movements intact  V- sensation intact bilaterally  VII- face symmetric  VIII- hearing intact, no nystagmus  IX, X- palate elevates symmetrically  XI- sternocleidomastoid 5/5  XII- tongue midline     MOTOR : intact bulk and tone  5/5 strength in all ext     SENSORY : intact to light touch in BUE      REFLEXES: Fuentes absent      MOVEMENT/COORDINATION: finger to nose, finger taps, and heel to shin intact bilaterally      GAIT : Appropriate stride length and speed      Cognition and Speech: Speech clear and coherent.     Psychiatric: Cooperative, Appropriate mood & affect.       Assessment/Plan:     Suzan Ballard is a 69 year old year old female who presents for routine follow-up concerning seizure management. This is a complex situation. Patient has a history of simple partial seizures described as right sided arm and leg paresthesias as well as possible partial seizures with secondary generalization described as bilateral arm and leg tonic clonic activity with preserved awareness. The latter episodes were questionably non-epileptic.  She reports that she has not had any seizure like activity since her second meningioma resection in 2012; however, per extensive review of notes from her prior epileptologist and general neurologist, patient continued to have simple partial seizures/seizure like activity for a couple of years after this.  To complicate things further she reports today that she has not been taking lamotrigine since 2018 after seeing Dr. Perez for the last time.  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 She, however, has been seizure free off AEDs for almost 7 years.  I will hold off restarting her lamotrigine and refer her to epilepsy for their expertise on this.     We discussed her OT CPT evaluation.  She does not want to pursue a BTW driving evaluation and will just continue to refrain from driving.  Denies progressive cognitive decline.       Plan  > Refer to epilepsy   > Routine EEG   > Follow-up with general neurology as needed

## 2025-07-09 ENCOUNTER — OFFICE VISIT (OUTPATIENT)
Dept: NEUROLOGY | Facility: CLINIC | Age: 70
End: 2025-07-09
Payer: COMMERCIAL

## 2025-07-09 VITALS
HEART RATE: 63 BPM | DIASTOLIC BLOOD PRESSURE: 68 MMHG | SYSTOLIC BLOOD PRESSURE: 144 MMHG | OXYGEN SATURATION: 100 % | BODY MASS INDEX: 25.87 KG/M2 | WEIGHT: 128.1 LBS

## 2025-07-09 DIAGNOSIS — R56.9 SEIZURE (H): Primary | ICD-10-CM

## 2025-07-09 NOTE — LETTER
2025      Suzan Ballard  [address]  [facility] [address]      Dear Colleague,    Thank you for referring your patient, Suzan Ballard, to the [facility]. Please see a copy of my visit note below.      Neurology Consultation    Patient Name:  Suzan Ballard  MRN:  [id number]    :  1955  Date of Service:  2025  Primary care provider:  Angela Lacey        Chief Complaint: Follow-up     History of Present Illness:     Suzan Ballard is a 69 year old female who presents for routine follow-up concerning seizure management.  Has not had any seizures.  She completed OT CPT. They discussed doing a BTW if she wanted to start driving again. She has not been driving for over 10 years now and children were more interested in her starting to drive again. She does not want to pursue BTW driving evaluation and will just continue to refrain from driving.     Prior History from 2025:     Started have episodes after resection in .  Episodes occurred at night.  Wake up feeling cold that will progress to shaking in both arms and legs.  She retains awareness during these episodes.  No loss of bowel or bladder control. No oral trauma associated.  She has not had any episodes of this since second resection in .     She denies other types of seizures or neurologic episodes but per review of the chart, in  she was having right arm weakness and clumsiness.  Had a period of unresponsiveness and memory loss.  There is some mention of right arm jerking.  MRI showed reoccurrence of meningioma. Had resection in 2012.  After this resection as above did not have the bilateral arm and leg shaking episodes but was having right sided arm and sometimes leg paresthesias.  Per review of the chart she continued to have this frequently for a couple of years.       She reports that she has not been taking lamotrigine since following up with Dr. Perez in 2018. Review of  records shows that this has been repeatedly prescribed by her PCPs over the years; however, she reports that she was not taking the medication even if she picked it up.  She denies having side effects with the lamotrigine; just does not like taking medications and medication is expensive.       She denies having any seizure like activity being off the medications. Denies episodes of confusion or lost time. No episodes of right arm or leg jerking. Does have some right arm numbness that seems to be positional. If she sleeps on it she will have the numbness and then when she rolls to the other side it resolves.      Reports short term memory loss after her second surgery but denies worsening of this. Feels like her memory is stable.  She is asking about driving clearance. Reportedly Dr. Perez told her she could drive but with restrictions. She has had neuropsychologic testing in the past.      Prior AEDs   - Phenytoin   - Keppra   - Lamotrigine      Review of Records   - Patient was previously being followed by Dr. Perez, last seen in 2018, and Dr. Ledesma in epilepsy clinic, last seen in 2016.   - Recurrent meningiomas, s/p multiple resections, s/p Gamma Knife most recently in 12/2023  - Follows with Dr. Sauer in neurosurgery, last visit in 12/2024.  No change to management, repeat MRI in 1 year      MRI brain 2024: Compared to study from December 2023, slight progressive interval increase in size of presumed meningioma along the posterior aspect of the vertex, further detailed above. Again, mass is closely associated with the superior sagittal sinus with some   degree of invasion likely. While no karla dural venous sinus thrombosis is evident, there is irregular T2 hyperintense signal abnormality within the inferior aspect of the superior sagittal sinus suggesting sluggish flow.  2.  Similar appearance of small presumed meningioma along the right aspect of the interhemispheric falx.  3.  Additional chronic  changes as above.  4.  No superimposed acute intracranial abnormality.      Physical Exam:  BP (!) 144/68   Pulse 63   Wt 58.1 kg (128 lb 1.6 oz)   LMP  (LMP Unknown)   SpO2 100%   BMI 25.87 kg/m      General:  No acute distress  Cardiovascular: Normal rate.  Lung: Respirations are non-labored.  Extremities: Normal range of motion  Integumentary: Warm and dry     Neurologic:  Mental status : alert, fund of knowledge appropriate for visit     LANGUAGE: Speech fluent, no dysarthria      CN:  II- pupils equal and reactive, visual fields full  III, IV, VI- extraocular movements intact  V- sensation intact bilaterally  VII- face symmetric  VIII- hearing intact, no nystagmus  IX, X- palate elevates symmetrically  XI- sternocleidomastoid 5/5  XII- tongue midline     MOTOR : intact bulk and tone  5/5 strength in all ext     SENSORY : intact to light touch in BUE      REFLEXES: Fuentes absent      MOVEMENT/COORDINATION: finger to nose, finger taps, and heel to shin intact bilaterally      GAIT : Appropriate stride length and speed      Cognition and Speech: Speech clear and coherent.     Psychiatric: Cooperative, Appropriate mood & affect.       Assessment/Plan:     Suzan Ballard is a 69 year old year old female who presents for routine follow-up concerning seizure management. This is a complex situation. Patient has a history of simple partial seizures described as right sided arm and leg paresthesias as well as possible partial seizures with secondary generalization described as bilateral arm and leg tonic clonic activity with preserved awareness. The latter episodes were questionably non-epileptic.  She reports that she has not had any seizure like activity since her second meningioma resection in 2012; however, per extensive review of notes from her prior epileptologist and general neurologist, patient continued to have simple partial seizures/seizure like activity for a couple of years after this.  To complicate  things further she reports today that she has not been taking lamotrigine since 2018 after seeing Dr. Perez for the last time.  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 She, however, has been seizure free off AEDs for almost 7 years.  I will hold off restarting her lamotrigine and refer her to epilepsy for their expertise on this.     We discussed her OT CPT evaluation.  She does not want to pursue a BTW driving evaluation and will just continue to refrain from driving.  Denies progressive cognitive decline.       Plan  > Refer to epilepsy   > Follow-up with general neurology as needed        Again, thank you for allowing me to participate in the care of your patient.        Sincerely,        Greer Musa, DO    Electronically signed

## 2025-07-10 ENCOUNTER — PATIENT OUTREACH (OUTPATIENT)
Dept: CARE COORDINATION | Facility: CLINIC | Age: 70
End: 2025-07-10
Payer: MEDICARE

## 2025-07-14 ENCOUNTER — PATIENT OUTREACH (OUTPATIENT)
Dept: CARE COORDINATION | Facility: CLINIC | Age: 70
End: 2025-07-14
Payer: MEDICARE

## 2025-07-17 ENCOUNTER — TELEPHONE (OUTPATIENT)
Dept: NEUROLOGY | Facility: CLINIC | Age: 70
End: 2025-07-17

## 2025-07-22 ENCOUNTER — OFFICE VISIT (OUTPATIENT)
Dept: SLEEP MEDICINE | Facility: CLINIC | Age: 70
End: 2025-07-22
Payer: COMMERCIAL

## 2025-07-22 VITALS
OXYGEN SATURATION: 98 % | HEART RATE: 60 BPM | DIASTOLIC BLOOD PRESSURE: 70 MMHG | HEIGHT: 59 IN | WEIGHT: 128.8 LBS | BODY MASS INDEX: 25.96 KG/M2 | SYSTOLIC BLOOD PRESSURE: 127 MMHG

## 2025-07-22 DIAGNOSIS — G47.33 OSA (OBSTRUCTIVE SLEEP APNEA): Primary | ICD-10-CM

## 2025-07-22 PROCEDURE — 1125F AMNT PAIN NOTED PAIN PRSNT: CPT | Performed by: INTERNAL MEDICINE

## 2025-07-22 PROCEDURE — 3078F DIAST BP <80 MM HG: CPT | Performed by: INTERNAL MEDICINE

## 2025-07-22 PROCEDURE — 99213 OFFICE O/P EST LOW 20 MIN: CPT | Performed by: INTERNAL MEDICINE

## 2025-07-22 PROCEDURE — 3074F SYST BP LT 130 MM HG: CPT | Performed by: INTERNAL MEDICINE

## 2025-07-22 ASSESSMENT — SLEEP AND FATIGUE QUESTIONNAIRES
HOW LIKELY ARE YOU TO NOD OFF OR FALL ASLEEP WHEN YOU ARE A PASSENGER IN A CAR FOR AN HOUR WITHOUT A BREAK: SLIGHT CHANCE OF DOZING
HOW LIKELY ARE YOU TO NOD OFF OR FALL ASLEEP WHILE SITTING INACTIVE IN A PUBLIC PLACE: WOULD NEVER DOZE
HOW LIKELY ARE YOU TO NOD OFF OR FALL ASLEEP WHILE SITTING AND TALKING TO SOMEONE: WOULD NEVER DOZE
HOW LIKELY ARE YOU TO NOD OFF OR FALL ASLEEP WHILE SITTING AND READING: SLIGHT CHANCE OF DOZING
HOW LIKELY ARE YOU TO NOD OFF OR FALL ASLEEP WHILE WATCHING TV: SLIGHT CHANCE OF DOZING
HOW LIKELY ARE YOU TO NOD OFF OR FALL ASLEEP IN A CAR, WHILE STOPPED FOR A FEW MINUTES IN TRAFFIC: SLIGHT CHANCE OF DOZING
HOW LIKELY ARE YOU TO NOD OFF OR FALL ASLEEP WHILE LYING DOWN TO REST IN THE AFTERNOON WHEN CIRCUMSTANCES PERMIT: SLIGHT CHANCE OF DOZING
HOW LIKELY ARE YOU TO NOD OFF OR FALL ASLEEP WHILE SITTING QUIETLY AFTER LUNCH WITHOUT ALCOHOL: WOULD NEVER DOZE

## 2025-07-22 NOTE — PROGRESS NOTES
Obstructive Sleep Apnea - PAP Follow-Up Visit:    Chief Complaint   Patient presents with    CPAP Follow Up     No current concerns     Suzan Ballard comes in today for follow-up of their moderate sleep apnea, managed with CPAP.     Patient was ultimately diagnosed with severe obstructive sleep apnea in 2013.Split-night PSG on 10/29/2013 at a weight of 164 pounds showed an apnea-hypopnea index of 55/h and lowest oxygen saturation of 85%.     After weight loss, she had reevaluation in 2024.    12/9/2024 [facility] Sleep Study (133.0 lbs) - AHI 14.5, RDI 22.1, Supine AHI 31.4, REM AHI 9.9, Low O2% 86.0%, Time Spent <=88% 0.5, Time Spent <=89% 1.2. Treatment was titrated to a pressure of CPAP 5 with an AHI 1.2. Time spent in REM supine at this pressure was 18.5 minutes.     Do you use a CPAP Machine at home: (Patient-Rptd) Yes  Overall, on a scale of 0-10 how would you rate your CPAP (0 poor, 10 great): (Patient-Rptd) 7  Is your mask comfortable: (Patient-Rptd) Yes  If not, why:    Is you mask leaking: (Patient-Rptd) No  If yes, where do you feel it:    How many night per week does the mask leak (0-7):    Do you notice snoring with mask on: (Patient-Rptd) Yes  Do you notice gasping arousals with mask on: (Patient-Rptd) No  Are you having significant oral or nasal dryness: (Patient-Rptd) Yes  Is the pressure setting comfortable: (Patient-Rptd) Yes  In not, why:    What type of mask do you use: (Patient-Rptd) Nasal Pillow  What is your typical bedtime: (Patient-Rptd) 10  How long does it take you to go to sleep on PAP therapy: (Patient-Rptd) 1  What time do you typically get out of bed for the day:    How many hours on average per night are you using PAP therapy: (Patient-Rptd) 5  How many hours are you sleeping per night: (Patient-Rptd) 6  Do you feel well rested in the morning: (Patient-Rptd) Yes    ResMed     Auto-PAP 5.0 - 8.0 cmH2O 30 day usage data:    33% of days with > 4 hours of use. 19/30 days with no use.    Average use 120 minutes per day.   95%ile Leak 25.4 L/min.   CPAP 95% pressure 7.9 cm.   AHI 1.2 events per hour.     EPWORTH SLEEPINESS SCALE         7/22/2025     9:57 AM    Day[place] Sleepiness Scale ( DONNY Knowles  [id number]<br>ESS - [place]/English - Final version - 21 Nov 07 - [facility] [employer].)   Sitting and reading Slight chance of dozing   Watching TV Slight chance of dozing   Sitting, inactive in a public place (e.g. a theatre or a meeting) Would never doze   As a passenger in a car for an hour without a break Slight chance of dozing   Lying down to rest in the afternoon when circumstances permit Slight chance of dozing   Sitting and talking to someone Would never doze   Sitting quietly after a lunch without alcohol Would never doze   In a car, while stopped for a few minutes in traffic Slight chance of dozing   Dayton Score (MC) 5   Dayton Score (Sleep) 5        Patient-reported         INSOMNIA SEVERITY INDEX (GILBERTO)          7/22/2025     9:52 AM   Insomnia Severity Index (GILBERTO)   Difficulty falling asleep 1   Difficulty staying asleep 0   Problems waking up too early 2   How SATISFIED/DISSATISFIED are you with your CURRENT sleep pattern? 2   How NOTICEABLE to others do you think your sleep problem is in terms of impairing the quality of your life? 1   How WORRIED/DISTRESSED are you about your current sleep problem? 0   To what extent do you consider your sleep problem to INTERFERE with your daily functioning (e.g. daytime fatigue, mood, ability to function at work/daily chores, concentration, memory, mood, etc.) CURRENTLY? 1   GILBERTO Total Score 7        Patient-reported         Guidelines for Scoring/Interpretation:  Total score categories:  0-7 = No clinically significant insomnia   8-14 = Subthreshold insomnia   15-21 = Clinical insomnia (moderate severity)  22-28 = Clinical insomnia (severe)  Used via courtesy of www.[place].va.gov with permission from Greg Callahan PhD., [facility]  Kori      Problem List:  Patient Active Problem List    Diagnosis Date Noted    Adenomatous polyp of colon 06/18/2025     Priority: Medium    Osteopenia of multiple sites 04/30/2025     Priority: Medium    ANSHU (obstructive sleep apnea) 12/12/2024     Priority: Medium     12/9/2024 [facility] Sleep Study (133.0 lbs) - AHI 14.5, RDI 22.1, Supine AHI 31.4, REM AHI 9.9, Low O2% 86.0%, Time Spent <=88% 0.5, Time Spent <=89% 1.2. Treatment was titrated to a pressure of CPAP 5 with an AHI 1.2. Time spent in REM supine at this pressure was 18.5 minutes.      Symptomatic varicose veins of both lower extremities 02/21/2024     Priority: Medium    Diabetes mellitus, type 2 (H) 10/12/2023     Priority: Medium    Benign neoplasm of cerebral meninges (H) 09/16/2022     Priority: Medium     Formatting of this note might be different from the original.  Created by [facility] Annotation: Oct 29 2007  4:02PM - Lamont Kapadia: s/p craniotomy   and removal of multiple meningioma regrowths, 11/12/12      Carpal tunnel syndrome 09/16/2022     Priority: Medium     Formatting of this note might be different from the original.  Created by Conversion      Disorder of bone and cartilage 09/16/2022     Priority: Medium     Formatting of this note might be different from the original.  Created by Conversion    Replacement Utility updated for latest IMO load      Hereditary and idiopathic peripheral neuropathy 09/16/2022     Priority: Medium     Formatting of this note might be different from the original.  Created by Conversion    Replacement Utility updated for latest IMO load      Acute pain of left knee 07/28/2021     Priority: Medium    Mixed hyperlipidemia 07/28/2021     Priority: Medium    Essential hypertension 07/28/2021     Priority: Medium    Acquired hypothyroidism 07/28/2021     Priority: Medium    Meningioma (H) 07/28/2021     Priority: Medium    Cooper's neuroma 06/04/2018     Priority: Medium    Allergic  "rhinitis 10/01/2015     Priority: Medium    GERD (gastroesophageal reflux disease) 10/01/2015     Priority: Medium    Seizure disorder (H) 06/11/2015     Priority: Medium    Osteoarthritis of right knee 06/11/2015     Priority: Medium    Partial epilepsy with impairment of consciousness, intractable (H) 09/09/2014     Priority: Medium     Seizures related to multiple meningiomas, last presenting Nov 2012 with 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 Also possible right arm sensory seizures. Seizures broke thru LEV 1500/d Nov 2012 in context of meningioma recurrence. Meningiomas resected and /d added which patient did not tolerate so changed to LEV 2000/d which is max she can tolerate. Seizures broke through maximally tolerated levetiracetam so transitioned to lamotrigine and increased to 300 mg/d with initial control of seizures.              /70   Pulse 60   Ht 1.511 m (4' 11.49\")   Wt 58.4 kg (128 lb 12.8 oz)   LMP  (LMP Unknown)   SpO2 98%   BMI 25.59 kg/m      Impression/Plan:     Moderate obstructive sleep apnea.     Patient is benefiting from CPAP therapy. She reports that sleep quality is better and she does not experience headaches when she uses CPAP.     She is frequently out of town visiting her sister and does not take CPAP with her.     I reviewed download data from her device.  Residual AHI is normal at current therapy settings.    We discussed importance of regular adherence with CPAP therapy.     Plan:     Continue auto CPAP therapy with a pressure range of 5 to 8 cm H2O      Suzan Ballard will follow up in about 1 year(s).       Electronically signed by Dr. Clemente Cooper, Diplomate, Sleep Medicine, ABPN       "

## 2025-07-22 NOTE — NURSING NOTE
"Chief Complaint   Patient presents with    CPAP Follow Up     No current concerns       Initial /70   Pulse 60   Ht 1.511 m (4' 11.49\")   Wt 58.4 kg (128 lb 12.8 oz)   LMP  (LMP Unknown)   SpO2 98%   BMI 25.59 kg/m   Estimated body mass index is 25.59 kg/m  as calculated from the following:    Height as of this encounter: 1.511 m (4' 11.49\").    Weight as of this encounter: 58.4 kg (128 lb 12.8 oz).    Medication Reconciliation: complete  ESS: 5  Neck circumference: 37 centimeters.    DME: JU Starkey, ARAVIND      "

## 2025-07-22 NOTE — PATIENT INSTRUCTIONS
Your There is no height or weight on file to calculate BMI.  Weight management is a personal decision.  If you are interested in exploring weight loss strategies, the following discussion covers the approaches that may be successful. Body mass index (BMI) is one way to tell whether you are at a healthy weight, overweight, or obese. It measures your weight in relation to your height.  A BMI of 18.5 to 24.9 is in the healthy range. A person with a BMI of 25 to 29.9 is considered overweight, and someone with a BMI of 30 or greater is considered obese. More than two-thirds of American adults are considered overweight or obese.  Being overweight or obese increases the risk for further weight gain. Excess weight may lead to heart disease and diabetes.  Creating and following plans for healthy eating and physical activity may help you improve your health.  Weight control is part of healthy lifestyle and includes exercise, emotional health, and healthy eating habits. Careful eating habits lifelong are the mainstay of weight control. Though there are significant health benefits from weight loss, long-term weight loss with diet alone may be very difficult to achieve- studies show long-term success with dietary management in less than 10% of people. Attaining a healthy weight may be especially difficult to achieve in those with severe obesity. In some cases, medications, devices and surgical management might be considered.  What can you do?  If you are overweight or obese and are interested in methods for weight loss, you should discuss this with your provider.   Consider reducing daily calorie intake by 500 calories.   Keep a food journal.   Avoiding skipping meals, consider cutting portions instead.    Diet combined with exercise helps maintain muscle while optimizing fat loss. Strength training is particularly important for building and maintaining muscle mass. Exercise helps reduce stress, increase energy, and improves  fitness. Increasing exercise without diet control, however, may not burn enough calories to loose weight.     Start walking three days a week 10-20 minutes at a time  Work towards walking thirty minutes five days a week   Eventually, increase the speed of your walking for 1-2 minutes at time    In addition, we recommend that you review healthy lifestyles and methods for weight loss available through the National Institutes of Health patient information sites:  http://win.niddk.nih.gov/publications/index.htm    And look into health and wellness programs that may be available through your health insurance provider, employer, local community center, or am[employer]ena club.

## 2025-08-03 ENCOUNTER — HEALTH MAINTENANCE LETTER (OUTPATIENT)
Age: 70
End: 2025-08-03

## 2025-08-18 DIAGNOSIS — I10 ESSENTIAL HYPERTENSION: ICD-10-CM

## 2025-08-19 RX ORDER — AMLODIPINE BESYLATE 10 MG/1
TABLET ORAL
Qty: 90 TABLET | Refills: 3 | Status: SHIPPED | OUTPATIENT
Start: 2025-08-19

## 2025-08-25 ENCOUNTER — VIRTUAL VISIT (OUTPATIENT)
Dept: NEUROLOGY | Facility: CLINIC | Age: 70
End: 2025-08-25
Attending: PSYCHIATRY & NEUROLOGY
Payer: COMMERCIAL

## 2025-08-25 VITALS — HEIGHT: 59 IN | WEIGHT: 130 LBS | BODY MASS INDEX: 26.21 KG/M2

## 2025-08-25 DIAGNOSIS — R56.9 SEIZURE (H): ICD-10-CM

## 2025-08-25 PROCEDURE — 98007 SYNCH AUDIO-VIDEO EST HI 40: CPT | Performed by: INTERNAL MEDICINE

## 2025-08-25 PROCEDURE — G2211 COMPLEX E/M VISIT ADD ON: HCPCS | Mod: 95 | Performed by: INTERNAL MEDICINE

## 2025-08-25 PROCEDURE — 1126F AMNT PAIN NOTED NONE PRSNT: CPT | Mod: 95 | Performed by: INTERNAL MEDICINE

## 2025-08-25 ASSESSMENT — PAIN SCALES - GENERAL: PAINLEVEL_OUTOF10: NO PAIN (0)

## (undated) RX ORDER — LIDOCAINE 40 MG/G
CREAM TOPICAL
Status: DISPENSED
Start: 2023-12-19

## (undated) RX ORDER — LORAZEPAM 0.5 MG/1
TABLET ORAL
Status: DISPENSED
Start: 2023-12-19

## (undated) RX ORDER — FENTANYL CITRATE 50 UG/ML
INJECTION, SOLUTION INTRAMUSCULAR; INTRAVENOUS
Status: DISPENSED
Start: 2025-06-04

## (undated) RX ORDER — SIMETHICONE 40MG/0.6ML
SUSPENSION, DROPS(FINAL DOSAGE FORM)(ML) ORAL
Status: DISPENSED
Start: 2025-06-04

## (undated) RX ORDER — ACETAMINOPHEN 325 MG/1
TABLET ORAL
Status: DISPENSED
Start: 2023-12-19